# Patient Record
Sex: MALE | Race: WHITE
[De-identification: names, ages, dates, MRNs, and addresses within clinical notes are randomized per-mention and may not be internally consistent; named-entity substitution may affect disease eponyms.]

---

## 2021-06-19 ENCOUNTER — HOSPITAL ENCOUNTER (INPATIENT)
Dept: HOSPITAL 75 - ER | Age: 41
LOS: 14 days | DRG: 207 | End: 2021-07-03
Attending: INTERNAL MEDICINE | Admitting: INTERNAL MEDICINE
Payer: COMMERCIAL

## 2021-06-19 VITALS — DIASTOLIC BLOOD PRESSURE: 62 MMHG | SYSTOLIC BLOOD PRESSURE: 104 MMHG

## 2021-06-19 VITALS — DIASTOLIC BLOOD PRESSURE: 66 MMHG | SYSTOLIC BLOOD PRESSURE: 127 MMHG

## 2021-06-19 VITALS — DIASTOLIC BLOOD PRESSURE: 71 MMHG | SYSTOLIC BLOOD PRESSURE: 124 MMHG

## 2021-06-19 VITALS — WEIGHT: 315 LBS | HEIGHT: 70.98 IN | BODY MASS INDEX: 44.1 KG/M2

## 2021-06-19 DIAGNOSIS — E66.01: ICD-10-CM

## 2021-06-19 DIAGNOSIS — E87.0: ICD-10-CM

## 2021-06-19 DIAGNOSIS — E78.00: ICD-10-CM

## 2021-06-19 DIAGNOSIS — U07.1: Primary | ICD-10-CM

## 2021-06-19 DIAGNOSIS — M19.91: ICD-10-CM

## 2021-06-19 DIAGNOSIS — F17.290: ICD-10-CM

## 2021-06-19 DIAGNOSIS — K21.9: ICD-10-CM

## 2021-06-19 DIAGNOSIS — M19.90: ICD-10-CM

## 2021-06-19 DIAGNOSIS — J96.01: ICD-10-CM

## 2021-06-19 DIAGNOSIS — F41.9: ICD-10-CM

## 2021-06-19 DIAGNOSIS — Z91.14: ICD-10-CM

## 2021-06-19 DIAGNOSIS — J96.02: ICD-10-CM

## 2021-06-19 DIAGNOSIS — G47.33: ICD-10-CM

## 2021-06-19 DIAGNOSIS — F32.9: ICD-10-CM

## 2021-06-19 DIAGNOSIS — J12.82: ICD-10-CM

## 2021-06-19 DIAGNOSIS — J45.909: ICD-10-CM

## 2021-06-19 DIAGNOSIS — I10: ICD-10-CM

## 2021-06-19 DIAGNOSIS — Z66: ICD-10-CM

## 2021-06-19 DIAGNOSIS — Z79.899: ICD-10-CM

## 2021-06-19 DIAGNOSIS — N28.9: ICD-10-CM

## 2021-06-19 DIAGNOSIS — E11.40: ICD-10-CM

## 2021-06-19 DIAGNOSIS — R00.1: ICD-10-CM

## 2021-06-19 DIAGNOSIS — R41.0: ICD-10-CM

## 2021-06-19 DIAGNOSIS — E11.65: ICD-10-CM

## 2021-06-19 DIAGNOSIS — E78.5: ICD-10-CM

## 2021-06-19 DIAGNOSIS — I95.9: ICD-10-CM

## 2021-06-19 LAB
ALBUMIN SERPL-MCNC: 3.6 GM/DL (ref 3.2–4.5)
ALP SERPL-CCNC: 36 U/L (ref 40–136)
ALT SERPL-CCNC: 29 U/L (ref 0–55)
BASE EXCESS STD BLDA CALC-SCNC: 1.5 MMOL/L (ref -2.5–2.5)
BASOPHILS # BLD AUTO: 0 10^3/UL (ref 0–0.1)
BASOPHILS NFR BLD AUTO: 0 % (ref 0–10)
BILIRUB SERPL-MCNC: 1.3 MG/DL (ref 0.1–1)
BODY TEMPERATURE: 101.3
BUN/CREAT SERPL: 9
CALCIUM SERPL-MCNC: 8 MG/DL (ref 8.5–10.1)
CHLORIDE SERPL-SCNC: 92 MMOL/L (ref 98–107)
CO2 BLDA CALC-SCNC: 25.1 MMOL/L (ref 21–31)
CO2 SERPL-SCNC: 25 MMOL/L (ref 21–32)
CREAT SERPL-MCNC: 1.22 MG/DL (ref 0.6–1.3)
EOSINOPHIL # BLD AUTO: 0 10^3/UL (ref 0–0.3)
EOSINOPHIL NFR BLD AUTO: 0 % (ref 0–10)
GFR SERPLBLD BASED ON 1.73 SQ M-ARVRAT: > 60 ML/MIN
GLUCOSE SERPL-MCNC: 381 MG/DL (ref 70–105)
HCT VFR BLD CALC: 40 % (ref 40–54)
HGB BLD-MCNC: 14.1 G/DL (ref 13.3–17.7)
INHALED O2 FLOW RATE: (no result) L/MIN
LYMPHOCYTES # BLD AUTO: 0.8 10^3/UL (ref 1–4)
LYMPHOCYTES NFR BLD AUTO: 10 % (ref 12–44)
MANUAL DIFFERENTIAL PERFORMED BLD QL: NO
MCH RBC QN AUTO: 29 PG (ref 25–34)
MCHC RBC AUTO-ENTMCNC: 36 G/DL (ref 32–36)
MCV RBC AUTO: 80 FL (ref 80–99)
MONOCYTES # BLD AUTO: 0.3 10^3/UL (ref 0–1)
MONOCYTES NFR BLD AUTO: 4 % (ref 0–12)
NEUTROPHILS # BLD AUTO: 7.2 10^3/UL (ref 1.8–7.8)
NEUTROPHILS NFR BLD AUTO: 86 % (ref 42–75)
PCO2 BLDA: 31 MMHG (ref 35–45)
PH BLDA: 7.5 [PH] (ref 7.37–7.43)
PLATELET # BLD: 158 10^3/UL (ref 130–400)
PMV BLD AUTO: 11 FL (ref 9–12.2)
PO2 BLDA: 74 MMHG (ref 79–93)
POTASSIUM SERPL-SCNC: 3.2 MMOL/L (ref 3.6–5)
PROT SERPL-MCNC: 6.8 GM/DL (ref 6.4–8.2)
SAO2 % BLDA FROM PO2: 95 % (ref 94–100)
SODIUM SERPL-SCNC: 131 MMOL/L (ref 135–145)
VENTILATION MODE VENT: NO
WBC # BLD AUTO: 8.4 10^3/UL (ref 4.3–11)

## 2021-06-19 PROCEDURE — 94760 N-INVAS EAR/PLS OXIMETRY 1: CPT

## 2021-06-19 PROCEDURE — 82805 BLOOD GASES W/O2 SATURATION: CPT

## 2021-06-19 PROCEDURE — 86901 BLOOD TYPING SEROLOGIC RH(D): CPT

## 2021-06-19 PROCEDURE — 82150 ASSAY OF AMYLASE: CPT

## 2021-06-19 PROCEDURE — 83036 HEMOGLOBIN GLYCOSYLATED A1C: CPT

## 2021-06-19 PROCEDURE — 87081 CULTURE SCREEN ONLY: CPT

## 2021-06-19 PROCEDURE — 80202 ASSAY OF VANCOMYCIN: CPT

## 2021-06-19 PROCEDURE — 80048 BASIC METABOLIC PNL TOTAL CA: CPT

## 2021-06-19 PROCEDURE — 87186 SC STD MICRODIL/AGAR DIL: CPT

## 2021-06-19 PROCEDURE — 87636 SARSCOV2 & INF A&B AMP PRB: CPT

## 2021-06-19 PROCEDURE — 87040 BLOOD CULTURE FOR BACTERIA: CPT

## 2021-06-19 PROCEDURE — 84132 ASSAY OF SERUM POTASSIUM: CPT

## 2021-06-19 PROCEDURE — 83735 ASSAY OF MAGNESIUM: CPT

## 2021-06-19 PROCEDURE — 87205 SMEAR GRAM STAIN: CPT

## 2021-06-19 PROCEDURE — 76937 US GUIDE VASCULAR ACCESS: CPT

## 2021-06-19 PROCEDURE — 87070 CULTURE OTHR SPECIMN AEROBIC: CPT

## 2021-06-19 PROCEDURE — 96360 HYDRATION IV INFUSION INIT: CPT

## 2021-06-19 PROCEDURE — 85025 COMPLETE CBC W/AUTO DIFF WBC: CPT

## 2021-06-19 PROCEDURE — 94660 CPAP INITIATION&MGMT: CPT

## 2021-06-19 PROCEDURE — 86141 C-REACTIVE PROTEIN HS: CPT

## 2021-06-19 PROCEDURE — 87088 URINE BACTERIA CULTURE: CPT

## 2021-06-19 PROCEDURE — 94799 UNLISTED PULMONARY SVC/PX: CPT

## 2021-06-19 PROCEDURE — 81000 URINALYSIS NONAUTO W/SCOPE: CPT

## 2021-06-19 PROCEDURE — 84145 PROCALCITONIN (PCT): CPT

## 2021-06-19 PROCEDURE — 94640 AIRWAY INHALATION TREATMENT: CPT

## 2021-06-19 PROCEDURE — 36584 COMPL RPLCMT PICC RS&I: CPT

## 2021-06-19 PROCEDURE — 82947 ASSAY GLUCOSE BLOOD QUANT: CPT

## 2021-06-19 PROCEDURE — 85379 FIBRIN DEGRADATION QUANT: CPT

## 2021-06-19 PROCEDURE — 86900 BLOOD TYPING SEROLOGIC ABO: CPT

## 2021-06-19 PROCEDURE — 84100 ASSAY OF PHOSPHORUS: CPT

## 2021-06-19 PROCEDURE — 80053 COMPREHEN METABOLIC PANEL: CPT

## 2021-06-19 PROCEDURE — 85027 COMPLETE CBC AUTOMATED: CPT

## 2021-06-19 PROCEDURE — 94664 DEMO&/EVAL PT USE INHALER: CPT

## 2021-06-19 PROCEDURE — 85007 BL SMEAR W/DIFF WBC COUNT: CPT

## 2021-06-19 PROCEDURE — 94002 VENT MGMT INPAT INIT DAY: CPT

## 2021-06-19 PROCEDURE — 96372 THER/PROPH/DIAG INJ SC/IM: CPT

## 2021-06-19 PROCEDURE — 83880 ASSAY OF NATRIURETIC PEPTIDE: CPT

## 2021-06-19 PROCEDURE — 71045 X-RAY EXAM CHEST 1 VIEW: CPT

## 2021-06-19 PROCEDURE — 84478 ASSAY OF TRIGLYCERIDES: CPT

## 2021-06-19 PROCEDURE — 94003 VENT MGMT INPAT SUBQ DAY: CPT

## 2021-06-19 PROCEDURE — 87077 CULTURE AEROBIC IDENTIFY: CPT

## 2021-06-19 PROCEDURE — 36569 INSJ PICC 5 YR+ W/O IMAGING: CPT

## 2021-06-19 PROCEDURE — 36415 COLL VENOUS BLD VENIPUNCTURE: CPT

## 2021-06-19 PROCEDURE — 83605 ASSAY OF LACTIC ACID: CPT

## 2021-06-19 RX ADMIN — ALBUTEROL SULFATE SCH GM: 90 AEROSOL, METERED RESPIRATORY (INHALATION) at 21:13

## 2021-06-19 RX ADMIN — ALBUTEROL SULFATE SCH GM: 90 AEROSOL, METERED RESPIRATORY (INHALATION) at 18:44

## 2021-06-19 RX ADMIN — DOCUSATE SODIUM AND SENNOSIDES SCH EA: 8.6; 5 TABLET, FILM COATED ORAL at 21:33

## 2021-06-19 RX ADMIN — INSULIN ASPART SCH UNIT: 100 INJECTION, SOLUTION INTRAVENOUS; SUBCUTANEOUS at 19:17

## 2021-06-19 RX ADMIN — INSULIN ASPART SCH UNIT: 100 INJECTION, SOLUTION INTRAVENOUS; SUBCUTANEOUS at 21:04

## 2021-06-19 NOTE — DIAGNOSTIC IMAGING REPORT
Indication: Cough.



Time of exam: 1:35 PM



Correlation is made prior chest 12/15/2015.



The heart size is normal. There is patchy airspace infiltrate in

the right base suggestive of pneumonia. Remaining lung fields are

clear. There is no effusion or pneumothorax.



Impression: Patchy right basilar pneumonia.



Dictated by: 



  Dictated on workstation # [id number]

## 2021-06-19 NOTE — ED RESPIRATORY
General


Chief Complaint:  Respiratory Problems


Stated Complaint:  SOA/COUGH/CHILLS/HEADACHE


Source:  patient


Exam Limitations:  no limitations





History of Present Illness


Date Seen by Provider:  2021


Time Seen by Provider:  13:00


Initial Comments


Diabetic overweight male presents with 48 hours of cough shortness of breath 

malaise fatigue chills nausea.  Has not been vaccinated against Covid.


Timing/Duration:  constant, getting worse


Severity:  moderate


Modifying Factors:  Worse With Activity, Worse With Coughing; Improves With 

Oxygen, Improves With Rest


Associated Symptoms:  cough, fever/chills, headache, muscle aches, nasal co

ngestion, shortness of breath





Allergies and Home Medications


Allergies


Coded Allergies:  


     adhesive (Unverified  Allergy, Unknown, RASH, 14)





Home Medications


Albuterol Sulfate 2.5 Mg/0.5 Ml Vial.neb, 2.5 MG IH Q4H


   Prescribed by: DAWSON HUBBARD on 12/15/15 2246


Benzonatate 100 Mg Capsule, 1-2 TAB PO TID


   Prescribed by: DAWSON HUBBARD on 12/15/15 2246


Budesonide 1 Mg/2 Ml Ampul.neb, 1 MG IH BID


   Prescribed by: DAWSON HUBBARD on 12/15/15 2246


Budesonide/Formoterol Fumarate 10.2 Gm Hfa.aer.ad, 2 PUFF IH BID, (Reported)


Cefdinir 300 Mg Capsule, 300 MG PO BID


   Prescribed by: DAWSON HUBBARD on 12/15/15 2246


Duloxetine Hcl 30 Mg Capsule.dr, 30 MG PO HS, (Reported)


Fenofibric Acid (Choline) 135 Mg Capsule.dr, 135 MG PO HS, (Reported)


Fluticasone Propionate 16 Gm Naspr, 1 SPRAY NS BID PRN, (Reported)


   AS NEEDED FOR ALLERGY SYMPTOMS 


Hctz/Lisinopril 1 Tab Tablet, 1 TAB PO HS, (Reported)


   20-25MG TABLET 


Liraglutide 0.6 Mg/0.1 Ml Pen.injctr, 1.8 MG SQ HS, (Reported)


Meloxicam 7.5 Mg Tablet, 7.5 MG PO DAILY, (Reported)


Mometasone Furoate 17 Gm Naspr, 2 SPRAYS NSEACH BID


   Prescribed by: DAWSON HUBBARD on 12/15/15 2246


Montelukast Sodium 10 Mg Tablet, 10 MG PO HS, (Reported)


Oxycodone Hcl/Acetaminophen 1 Each Tablet, 1-2 EACH PO Q4-6H PRN


   Prescribed by: ARVIN SCHULER on 14 1040


Pregabalin 100 Mg Capsule, 100 MG PO TID, (Reported)


Rabeprazole Sodium 20 Mg Tablet.dr, 20 MG PO HS, (Reported)


[Proventil Inhaler]  , 2 PUFF IH Q4H PRN for SHORTNESS OF BREATH, (Reported)


   PRN SOB 





Patient Home Medication List


Home Medication List Reviewed:  Yes





Review of Systems


Review of Systems


Constitutional:  see HPI, chills, malaise, weakness


EENTM:  see HPI


Respiratory:  see HPI, cough, short of breath


Cardiovascular:  no symptoms reported


Genitourinary:  no symptoms reported


Musculoskeletal:  no symptoms reported


Skin:  no symptoms reported


Psychiatric/Neurological:  No Symptoms Reported





Past Medical-Social-Family Hx


Immunizations Up To Date


Date of Influenza Vaccine:  2012





Seasonal Allergies


Seasonal Allergies:  Yes





Past Medical History


Eye Surgery, Vasectomy


Asthma


High Cholesterol, Hypertension


Neuropathy


Reproductive Disorders:  No


Kidney Stones


Gastroesophageal Reflux, Hemorrhoids


Arthritis


Diabetes, Non-Insulin dep


Anxiety, Depression





Physical Exam





Vital Signs - First Documented








 21





 12:45


 


Temp 38.0


 


Pulse 83


 


Resp 22


 


B/P (MAP) 116/77 (90)


 


Pulse Ox 94


 


O2 Delivery Nasal Cannula


 


O2 Flow Rate 2.00





Capillary Refill :


Height: 5'11"


Weight: 305lbs. oz. 138.087171kn;  BMI


Method:Stated


General Appearance:  WD/WN, no apparent distress, obese, other (No distress.  

88% on room air.  Up to 94% with 2 L of supplemental oxygen.)


Eyes:  Bilateral Eye Normal Inspection, Bilateral Eye PERRL, Bilateral Eye EOMI


Neck:  non-tender, full range of motion


Respiratory:  chest non-tender, no respiratory distress, no accessory muscle use


Cardiovascular:  regular rate, rhythm, no murmur


Gastrointestinal:  normal bowel sounds, non tender, soft


Neurologic/Psychiatric:  alert, normal mood/affect, oriented x 3


Skin:  normal color, warm/dry





Focused Exam


Lactate Level


21 12:50: Lactic Acid Level 1.97





Lactic Acid Level





Laboratory Tests








Test


 21


12:50


 


Lactic Acid Level


 1.97 MMOL/L


(0.50-2.00)











Progress/Results/Core Measures


Suspected Sepsis


SIRS


Temperature: 


Pulse:  


Respiratory Rate: 


 


Laboratory Tests


21 12:50: White Blood Count 8.4


Blood Pressure  / 


Mean: 


 


21 12:50: Lactic Acid Level 1.97


Laboratory Tests


21 12:50: 


Creatinine 1.22, Platelet Count 158, Total Bilirubin 1.3H








Results/Orders


Lab Results





Laboratory Tests








Test


 21


12:50 Range/Units


 


 


White Blood Count


 8.4 


 4.3-11.0


10^3/uL


 


Red Blood Count


 4.94 


 4.30-5.52


10^6/uL


 


Hemoglobin 14.1  13.3-17.7  g/dL


 


Hematocrit 40  40-54  %


 


Mean Corpuscular Volume 80  80-99  fL


 


Mean Corpuscular Hemoglobin 29  25-34  pg


 


Mean Corpuscular Hemoglobin


Concent 36 


 32-36  g/dL





 


Red Cell Distribution Width 12.5  10.0-14.5  %


 


Platelet Count


 158 


 130-400


10^3/uL


 


Mean Platelet Volume 11.0  9.0-12.2  fL


 


Immature Granulocyte % (Auto) 1   %


 


Neutrophils (%) (Auto) 86 H 42-75  %


 


Lymphocytes (%) (Auto) 10 L 12-44  %


 


Monocytes (%) (Auto) 4  0-12  %


 


Eosinophils (%) (Auto) 0  0-10  %


 


Basophils (%) (Auto) 0  0-10  %


 


Neutrophils # (Auto)


 7.2 


 1.8-7.8


10^3/uL


 


Lymphocytes # (Auto)


 0.8 L


 1.0-4.0


10^3/uL


 


Monocytes # (Auto)


 0.3 


 0.0-1.0


10^3/uL


 


Eosinophils # (Auto)


 0.0 


 0.0-0.3


10^3/uL


 


Basophils # (Auto)


 0.0 


 0.0-0.1


10^3/uL


 


Immature Granulocyte # (Auto)


 0.0 


 0.0-0.1


10^3/uL


 


D-Dimer


 0.50 H


 0.00-0.49


UG/ML


 


Sodium Level 131 L 135-145  MMOL/L


 


Potassium Level 3.2 L 3.6-5.0  MMOL/L


 


Chloride Level 92 L   MMOL/L


 


Carbon Dioxide Level 25  21-32  MMOL/L


 


Anion Gap 14  5-14  MMOL/L


 


Blood Urea Nitrogen 11  7-18  MG/DL


 


Creatinine


 1.22 


 0.60-1.30


MG/DL


 


Estimat Glomerular Filtration


Rate > 60 


  





 


BUN/Creatinine Ratio 9   


 


Glucose Level 381 H   MG/DL


 


Lactic Acid Level


 1.97 


 0.50-2.00


MMOL/L


 


Calcium Level 8.0 L 8.5-10.1  MG/DL


 


Corrected Calcium 8.3 L 8.5-10.1  MG/DL


 


Total Bilirubin 1.3 H 0.1-1.0  MG/DL


 


Aspartate Amino Transf


(AST/SGOT) 27 


 5-34  U/L





 


Alanine Aminotransferase


(ALT/SGPT) 29 


 0-55  U/L





 


Alkaline Phosphatase 36 L   U/L


 


C-Reactive Protein High


Sensitivity 16.44 H


 0.00-0.50


MG/DL


 


B-Type Natriuretic Peptide 48.2  <100.0  PG/ML


 


Total Protein 6.8  6.4-8.2  GM/DL


 


Albumin 3.6  3.2-4.5  GM/DL


 


Procalcitonin 0.28 H <0.10  NG/ML


 


Influenza Type A (RT-PCR) Not Detected  Not Detecte  


 


Influenza Type B (RT-PCR) Not Detected  Not Detecte  


 


SARS-CoV-2 RNA (RT-PCR) Detected H Not Detecte  








My Orders





Orders - ALEXA RIBEIRO APRN


Cbc With Automated Diff (21 12:52)


Covid 19 Inhouse Test (21 12:52)


Hs C Reactive Protein (21 12:52)


Comprehensive Metabolic Panel (21 12:52)


Fibrin Degradation Products (21 12:52)


Chest 1 View, Ap/Pa Only (21 12:52)


Procalcitonin (Pct) (21 12:52)


Ed Iv/Invasive Line Start (21 12:52)


Blood Culture (21 12:52)


Influenza A And B By Pcr (21 12:52)


Lactic Acid Analyzer (21 12:52)


BNP (21 13:43)





Medications Given in ED





Vital Signs/I&O











 21





 12:45


 


Temp 38.0


 


Pulse 83


 


Resp 22


 


B/P (MAP) 116/77 (90)


 


Pulse Ox 94


 


O2 Delivery Nasal Cannula


 


O2 Flow Rate 2.00





Capillary Refill :





Diagnostic Imaging





   Diagonstic Imaging:  Xray


Comments


NAME:   TINO BOWERS


MED REC#:   [id number]


ACCOUNT#:   [id number]


PT STATUS:   REG ER


:   1980


PHYSICIAN:   ALEXA RIBEIRO


ADMIT DATE:   21/ER


                                   ***Draft***


Date of Exam:21





CHEST 1 VIEW, AP/PA ONLY








Indication: Cough.





Time of exam: 1:35 PM





Correlation is made prior chest 12/15/2015.





The heart size is normal. There is patchy airspace infiltrate in


the right base suggestive of pneumonia. Remaining lung fields are


clear. There is no effusion or pneumothorax.





Impression: Patchy right basilar pneumonia.





  Dictated on workstation # [id number]








Dict:   21 1346


Trans:   21 1359


CVB [id number]





Interpreted by:     ANN DAWKINS MD


Electronically signed by:





Departure


Communication (Admissions)


[id number] spoke with Dr. Garcia will admit on remdesivir, Decadron, 20 units of 

regular insulin





Impression





   Primary Impression:  


   COVID-19


   Additional Impression:  


   Hypoxia


Disposition:   ADMITTED AS INPATIENT


Condition:  Stable





Admissions


Decision to Admit Reason:  Admit from ER (General)


Decision to Admit/Date:  2021


Time/Decision to Admit Time:  14:03





Departure-Patient Inst.


Referrals:  


[facility] [place]


Primary Care Physician











ALEXA RIBEIRO             2021 13:26

## 2021-06-20 VITALS — SYSTOLIC BLOOD PRESSURE: 116 MMHG | DIASTOLIC BLOOD PRESSURE: 74 MMHG

## 2021-06-20 VITALS — SYSTOLIC BLOOD PRESSURE: 120 MMHG | DIASTOLIC BLOOD PRESSURE: 65 MMHG

## 2021-06-20 VITALS — SYSTOLIC BLOOD PRESSURE: 107 MMHG | DIASTOLIC BLOOD PRESSURE: 64 MMHG

## 2021-06-20 VITALS — SYSTOLIC BLOOD PRESSURE: 94 MMHG | DIASTOLIC BLOOD PRESSURE: 53 MMHG

## 2021-06-20 VITALS — DIASTOLIC BLOOD PRESSURE: 64 MMHG | SYSTOLIC BLOOD PRESSURE: 117 MMHG

## 2021-06-20 VITALS — DIASTOLIC BLOOD PRESSURE: 55 MMHG | SYSTOLIC BLOOD PRESSURE: 117 MMHG

## 2021-06-20 VITALS — DIASTOLIC BLOOD PRESSURE: 61 MMHG | SYSTOLIC BLOOD PRESSURE: 110 MMHG

## 2021-06-20 VITALS — SYSTOLIC BLOOD PRESSURE: 115 MMHG | DIASTOLIC BLOOD PRESSURE: 57 MMHG

## 2021-06-20 VITALS — SYSTOLIC BLOOD PRESSURE: 118 MMHG | DIASTOLIC BLOOD PRESSURE: 62 MMHG

## 2021-06-20 VITALS — SYSTOLIC BLOOD PRESSURE: 118 MMHG | DIASTOLIC BLOOD PRESSURE: 66 MMHG

## 2021-06-20 VITALS — DIASTOLIC BLOOD PRESSURE: 59 MMHG | SYSTOLIC BLOOD PRESSURE: 107 MMHG

## 2021-06-20 VITALS — DIASTOLIC BLOOD PRESSURE: 65 MMHG | SYSTOLIC BLOOD PRESSURE: 153 MMHG

## 2021-06-20 VITALS — SYSTOLIC BLOOD PRESSURE: 105 MMHG | DIASTOLIC BLOOD PRESSURE: 60 MMHG

## 2021-06-20 VITALS — DIASTOLIC BLOOD PRESSURE: 70 MMHG | SYSTOLIC BLOOD PRESSURE: 115 MMHG

## 2021-06-20 VITALS — SYSTOLIC BLOOD PRESSURE: 114 MMHG | DIASTOLIC BLOOD PRESSURE: 58 MMHG

## 2021-06-20 VITALS — SYSTOLIC BLOOD PRESSURE: 128 MMHG | DIASTOLIC BLOOD PRESSURE: 71 MMHG

## 2021-06-20 VITALS — DIASTOLIC BLOOD PRESSURE: 72 MMHG | SYSTOLIC BLOOD PRESSURE: 108 MMHG

## 2021-06-20 VITALS — DIASTOLIC BLOOD PRESSURE: 71 MMHG | SYSTOLIC BLOOD PRESSURE: 128 MMHG

## 2021-06-20 VITALS — SYSTOLIC BLOOD PRESSURE: 116 MMHG | DIASTOLIC BLOOD PRESSURE: 60 MMHG

## 2021-06-20 VITALS — SYSTOLIC BLOOD PRESSURE: 116 MMHG | DIASTOLIC BLOOD PRESSURE: 55 MMHG

## 2021-06-20 LAB
ALBUMIN SERPL-MCNC: 3.2 GM/DL (ref 3.2–4.5)
ALP SERPL-CCNC: 39 U/L (ref 40–136)
ALT SERPL-CCNC: 50 U/L (ref 0–55)
ARTERIAL PATENCY WRIST A: POSITIVE
BASE EXCESS STD BLDA CALC-SCNC: 2.5 MMOL/L (ref -2.5–2.5)
BASOPHILS # BLD AUTO: 0 10^3/UL (ref 0–0.1)
BASOPHILS # BLD AUTO: 0 10^3/UL (ref 0–0.1)
BASOPHILS NFR BLD AUTO: 0 % (ref 0–10)
BASOPHILS NFR BLD AUTO: 0 % (ref 0–10)
BDY SITE: (no result)
BILIRUB SERPL-MCNC: 1 MG/DL (ref 0.1–1)
BODY TEMPERATURE: 38.6
BUN/CREAT SERPL: 14
BUN/CREAT SERPL: 15
CALCIUM SERPL-MCNC: 7.6 MG/DL (ref 8.5–10.1)
CALCIUM SERPL-MCNC: 8.1 MG/DL (ref 8.5–10.1)
CHLORIDE SERPL-SCNC: 96 MMOL/L (ref 98–107)
CHLORIDE SERPL-SCNC: 97 MMOL/L (ref 98–107)
CO2 BLDA CALC-SCNC: 26.2 MMOL/L (ref 21–31)
CO2 SERPL-SCNC: 23 MMOL/L (ref 21–32)
CO2 SERPL-SCNC: 23 MMOL/L (ref 21–32)
CREAT SERPL-MCNC: 1.13 MG/DL (ref 0.6–1.3)
CREAT SERPL-MCNC: 1.23 MG/DL (ref 0.6–1.3)
EOSINOPHIL # BLD AUTO: 0 10^3/UL (ref 0–0.3)
EOSINOPHIL # BLD AUTO: 0 10^3/UL (ref 0–0.3)
EOSINOPHIL NFR BLD AUTO: 0 % (ref 0–10)
EOSINOPHIL NFR BLD AUTO: 0 % (ref 0–10)
GFR SERPLBLD BASED ON 1.73 SQ M-ARVRAT: > 60 ML/MIN
GFR SERPLBLD BASED ON 1.73 SQ M-ARVRAT: > 60 ML/MIN
GLUCOSE SERPL-MCNC: 276 MG/DL (ref 70–105)
GLUCOSE SERPL-MCNC: 417 MG/DL (ref 70–105)
HCT VFR BLD CALC: 35 % (ref 40–54)
HCT VFR BLD CALC: 36 % (ref 40–54)
HGB BLD-MCNC: 12.3 G/DL (ref 13.3–17.7)
HGB BLD-MCNC: 12.7 G/DL (ref 13.3–17.7)
INHALED O2 FLOW RATE: (no result) L/MIN
LYMPHOCYTES # BLD AUTO: 0.9 10^3/UL (ref 1–4)
LYMPHOCYTES # BLD AUTO: 1 10^3/UL (ref 1–4)
LYMPHOCYTES NFR BLD AUTO: 13 % (ref 12–44)
LYMPHOCYTES NFR BLD AUTO: 7 % (ref 12–44)
MANUAL DIFFERENTIAL PERFORMED BLD QL: NO
MANUAL DIFFERENTIAL PERFORMED BLD QL: YES
MCH RBC QN AUTO: 29 PG (ref 25–34)
MCH RBC QN AUTO: 29 PG (ref 25–34)
MCHC RBC AUTO-ENTMCNC: 35 G/DL (ref 32–36)
MCHC RBC AUTO-ENTMCNC: 36 G/DL (ref 32–36)
MCV RBC AUTO: 80 FL (ref 80–99)
MCV RBC AUTO: 81 FL (ref 80–99)
MONOCYTES # BLD AUTO: 0.3 10^3/UL (ref 0–1)
MONOCYTES # BLD AUTO: 0.3 10^3/UL (ref 0–1)
MONOCYTES NFR BLD AUTO: 3 % (ref 0–12)
MONOCYTES NFR BLD AUTO: 4 % (ref 0–12)
MONOCYTES NFR BLD: 2 %
NEUTROPHILS # BLD AUTO: 11.5 10^3/UL (ref 1.8–7.8)
NEUTROPHILS # BLD AUTO: 6.3 10^3/UL (ref 1.8–7.8)
NEUTROPHILS NFR BLD AUTO: 82 % (ref 42–75)
NEUTROPHILS NFR BLD AUTO: 90 % (ref 42–75)
NEUTS BAND NFR BLD MANUAL: 90 %
PCO2 BLDA: 33 MMHG (ref 35–45)
PH BLDA: 7.51 [PH] (ref 7.37–7.43)
PLATELET # BLD: 144 10^3/UL (ref 130–400)
PLATELET # BLD: 215 10^3/UL (ref 130–400)
PMV BLD AUTO: 11.3 FL (ref 9–12.2)
PMV BLD AUTO: 11.5 FL (ref 9–12.2)
PO2 BLDA: 85 MMHG (ref 79–93)
POTASSIUM SERPL-SCNC: 3 MMOL/L (ref 3.6–5)
POTASSIUM SERPL-SCNC: 3.3 MMOL/L (ref 3.6–5)
PROT SERPL-MCNC: 6.3 GM/DL (ref 6.4–8.2)
RBC MORPH BLD: NORMAL
SAO2 % BLDA FROM PO2: 96 % (ref 94–100)
SODIUM SERPL-SCNC: 133 MMOL/L (ref 135–145)
SODIUM SERPL-SCNC: 135 MMOL/L (ref 135–145)
VARIANT LYMPHS NFR BLD MANUAL: 8 %
VENTILATION MODE VENT: NO
WBC # BLD AUTO: 12.8 10^3/UL (ref 4.3–11)
WBC # BLD AUTO: 7.7 10^3/UL (ref 4.3–11)

## 2021-06-20 PROCEDURE — XW13325 TRANSFUSION OF CONVALESCENT PLASMA (NONAUTOLOGOUS) INTO PERIPHERAL VEIN, PERCUTANEOUS APPROACH, NEW TECHNOLOGY GROUP 5: ICD-10-PCS | Performed by: INTERNAL MEDICINE

## 2021-06-20 PROCEDURE — 02HV33Z INSERTION OF INFUSION DEVICE INTO SUPERIOR VENA CAVA, PERCUTANEOUS APPROACH: ICD-10-PCS | Performed by: INTERNAL MEDICINE

## 2021-06-20 PROCEDURE — 5A09457 ASSISTANCE WITH RESPIRATORY VENTILATION, 24-96 CONSECUTIVE HOURS, CONTINUOUS POSITIVE AIRWAY PRESSURE: ICD-10-PCS | Performed by: INTERNAL MEDICINE

## 2021-06-20 PROCEDURE — XW033E5 INTRODUCTION OF REMDESIVIR ANTI-INFECTIVE INTO PERIPHERAL VEIN, PERCUTANEOUS APPROACH, NEW TECHNOLOGY GROUP 5: ICD-10-PCS | Performed by: INTERNAL MEDICINE

## 2021-06-20 RX ADMIN — POTASSIUM CHLORIDE SCH MLS/HR: 200 INJECTION, SOLUTION INTRAVENOUS at 23:24

## 2021-06-20 RX ADMIN — INSULIN ASPART SCH UNIT: 100 INJECTION, SOLUTION INTRAVENOUS; SUBCUTANEOUS at 06:14

## 2021-06-20 RX ADMIN — DOCUSATE SODIUM AND SENNOSIDES SCH EA: 8.6; 5 TABLET, FILM COATED ORAL at 19:18

## 2021-06-20 RX ADMIN — INSULIN ASPART SCH UNIT: 100 INJECTION, SOLUTION INTRAVENOUS; SUBCUTANEOUS at 17:10

## 2021-06-20 RX ADMIN — INSULIN HUMAN PRN UNIT: 100 INJECTION, SOLUTION PARENTERAL at 17:10

## 2021-06-20 RX ADMIN — INSULIN HUMAN PRN UNIT: 100 INJECTION, SOLUTION PARENTERAL at 20:34

## 2021-06-20 RX ADMIN — INSULIN ASPART SCH UNIT: 100 INJECTION, SOLUTION INTRAVENOUS; SUBCUTANEOUS at 11:33

## 2021-06-20 RX ADMIN — POTASSIUM CHLORIDE SCH MLS/HR: 200 INJECTION, SOLUTION INTRAVENOUS at 22:53

## 2021-06-20 RX ADMIN — FLUTICASONE PROPIONATE AND SALMETEROL SCH EACH: 232; 14 POWDER, METERED RESPIRATORY (INHALATION) at 03:38

## 2021-06-20 RX ADMIN — DEXAMETHASONE SCH MG: 6 TABLET ORAL at 06:14

## 2021-06-20 RX ADMIN — ALBUTEROL SULFATE SCH GM: 90 AEROSOL, METERED RESPIRATORY (INHALATION) at 03:38

## 2021-06-20 RX ADMIN — ALBUTEROL SULFATE SCH GM: 90 AEROSOL, METERED RESPIRATORY (INHALATION) at 10:16

## 2021-06-20 RX ADMIN — FLUTICASONE PROPIONATE AND SALMETEROL SCH EACH: 232; 14 POWDER, METERED RESPIRATORY (INHALATION) at 06:56

## 2021-06-20 RX ADMIN — ALBUTEROL SULFATE SCH GM: 90 AEROSOL, METERED RESPIRATORY (INHALATION) at 06:52

## 2021-06-20 RX ADMIN — ALBUTEROL SULFATE SCH GM: 90 AEROSOL, METERED RESPIRATORY (INHALATION) at 19:41

## 2021-06-20 RX ADMIN — INSULIN ASPART SCH UNIT: 100 INJECTION, SOLUTION INTRAVENOUS; SUBCUTANEOUS at 20:35

## 2021-06-20 RX ADMIN — SODIUM CHLORIDE SCH MLS/HR: 900 INJECTION, SOLUTION INTRAVENOUS at 09:05

## 2021-06-20 RX ADMIN — ENOXAPARIN SODIUM SCH MG: 100 INJECTION SUBCUTANEOUS at 12:24

## 2021-06-20 RX ADMIN — DOCUSATE SODIUM AND SENNOSIDES SCH EA: 8.6; 5 TABLET, FILM COATED ORAL at 07:38

## 2021-06-20 RX ADMIN — ALBUTEROL SULFATE SCH GM: 90 AEROSOL, METERED RESPIRATORY (INHALATION) at 20:21

## 2021-06-20 RX ADMIN — ALBUTEROL SULFATE SCH GM: 90 AEROSOL, METERED RESPIRATORY (INHALATION) at 13:58

## 2021-06-20 RX ADMIN — ALPRAZOLAM PRN MG: 0.25 TABLET ORAL at 08:26

## 2021-06-20 RX ADMIN — ENOXAPARIN SODIUM SCH MG: 100 INJECTION SUBCUTANEOUS at 20:22

## 2021-06-20 RX ADMIN — FLUTICASONE PROPIONATE AND SALMETEROL SCH EACH: 232; 14 POWDER, METERED RESPIRATORY (INHALATION) at 20:29

## 2021-06-20 NOTE — DIAGNOSTIC IMAGING REPORT
EXAM: Chest 1 view, AP/PA only.



INDICATION: Pneumonia. COVID.



COMPARISON: Chest radiograph 06/19/2021.



FINDINGS: Diffuse airspace opacities are similar to yesterday. No

pleural effusion or pneumothorax. Low lung volumes. Normal heart

size and central pulmonary vascularity. No acute osseous

findings.



IMPRESSION: Airspace opacities, bilaterally, are similar to

yesterday. 



Dictated by: 



  Dictated on workstation # [id number]

## 2021-06-20 NOTE — HISTORY & PHYSICAL-HOSPITALIST
History of Present Illness


HPI/Chief Complaint


Chief complaint: Shortness of breath due to Covid





History of present illness: This is a 40-year-old white male diabetic 

noncompliant with diabetic medication who does not use oxygen or CPAP machine 

but appears to be high risk for CPAP due to neck circumference and BMI of 40 who

presented to the ER with shortness of breath was diagnosed with Covid and he is 

hypoxic.  Patient has progressed throughout the day and at 2100 hrs. the 

decision was made to transfer up to the ICU for pulmonary critical care 

management due to maxed on Vapotherm likely will need BiPAP and possible 

intubation.  His blood sugars remain in the 300s with aggressive insulin regimen

trying to decrease the level.  He does not currently smoke or drink alcohol any 

works for [employer].  He has not been vaccinated against COVID-19.


Source:  patient, RN/MD


Exam Limitations:  no limitations


Date Seen


6/20/21


Time Seen by a Provider:  10:30


Attending Physician


Suzanne Garcia DO


[facility]/Critical access hospital


Referring Physician





Date of Admission


Jun 19, 2021 at 14:00





Home Medications & Allergies


Home Medications


Reviewed patient Home Medication Reconciliation performed by pharmacy medication

reconciliations technician and/or nursing.


Patients Allergies have been reviewed.





Allergies





Allergies


Coded Allergies


  adhesive (Unverified Allergy, Unknown, RASH, 11/6/14)








Past Medical-Social-Family Hx


Patient Social History


Marrital Status:  single


Employed/Student:  employed


Tobacco Use?:  No


Smoking Status:  Never a Smoker


Smokeless Tobacco Frequency:  Never a User


Use of E-Cig and/or Vaping dev:  Yes


E-Cig or Vaping type used:  Nicotine


Use of E-Cig and/or Vaping John:  Current Everyday User


Substance use?:  No


Alcohol Use?:  No


Pt feels they are or have been:  No





Immunizations Up To Date


Date of Influenza Vaccine:  Nov 1, 2012


First/Initial COVID19 Vaccinat:  NEVER


Tetanus Booster (TDap):  More Than 5 Years


Hepatitis A:  No


Hepatitis B:  No


PED Vaccines UTD:  Yes





Seasonal Allergies


Seasonal Allergies:  Yes





Current Status


Advance Directives:  No


Communicates:  Verbally


Primary Language:  English


Preferred Spoken Language:  English


Is interpretation needed?:  No


Implanted or Applied Medical D:  None





Past Medical History


Surgeries:  Eye Surgery, Vasectomy


Asthma


High Cholesterol, Hypertension


Neuropathy


Kidney Stones


Gastroesophageal Reflux, Hemorrhoids


Arthritis


Diabetes, Non-Insulin dep


Anxiety, Depression


Blood Disorders:  No





Review of Systems


Constitutional:  see HPI, weakness


Respiratory:  cough





Physical Exam


Physical Exam


Vital Signs





Vital Signs - First Documented








 6/19/21 6/19/21





 12:45 16:43


 


Temp 38.0 


 


Pulse 83 


 


Resp 22 


 


B/P (MAP) 116/77 (90) 


 


Pulse Ox 94 


 


O2 Delivery Nasal Cannula 


 


O2 Flow Rate 2.00 


 


FiO2  28





Capillary Refill : Less Than 3 Seconds


Height, Weight, BMI


Height: 5'11"


Weight: 305lbs. oz. 138.992754ha; 39.92 BMI


Method:Stated


General Appearance:  No Apparent Distress, Anxious, Chronically ill, Obese


Eyes:  Right Eye Normal Inspection, Right Eye PERRL


HEENT:  PERRL/EOMI, Normal ENT Inspection, Pharynx Normal, Moist Mucous 

Membranes


Neck:  Full Range of Motion, Normal Inspection, Non Tender


Respiratory:  Chest Non Tender, Lungs Clear, No Accessory Muscle Use, No 

Respiratory Distress, Decreased Breath Sounds


Cardiovascular:  Regular Rate, Rhythm, No Edema, No Gallop, No JVD, No Murmur, 

Normal Peripheral Pulses


Gastrointestinal:  Normal Bowel Sounds, No Organomegaly, No Pulsatile Mass, Non 

Tender, Soft


Back:  Normal Inspection, No CVA Tenderness, No Vertebral Tenderness


Extremity:  Normal Capillary Refill, Normal Inspection, Normal Range of Motion, 

Non Tender, No Calf Tenderness, No Pedal Edema


Neurologic/Psychiatric:  Alert, Oriented x3, No Motor/Sensory Deficits, Normal 

Mood/Affect


Skin:  Normal Color, Warm/Dry


Lymphatic:  No Adenopathy





Results


Results/Procedures


Labs


Laboratory Tests


6/19/21 12:50








6/20/21 05:20








Patient resulted labs reviewed.





Assessment/Plan


Admission Diagnosis


Assessment:


COVID-19 pneumonia


Hypoxia maxing on Vapotherm transferring to ICU for BiPAP and possible 

intubation


Morbid obesity BMI 40


Diabetes out-of-control in 300 range hemoglobin A1c pending


Neuropathy


Hypertension


Hyperlipidemia





Plan:


Transfer to the ICU


BiPAP


Possible intubation


Lovenox twice daily


Decadron


Convalescent plasma


Remdesivir


Monitor closely


Admission Status:  Inpatient Order (span 2 midnights)


Reason for Inpatient Admission:  


COVID





Diagnosis/Problems


Diagnosis/Problems





(1) COVID-19


Status:  Acute


(2) Hypoxia


Status:  Acute











SUZANNE GARCIA DO                Jun 20, 2021 05:53

## 2021-06-21 VITALS — DIASTOLIC BLOOD PRESSURE: 78 MMHG | SYSTOLIC BLOOD PRESSURE: 120 MMHG

## 2021-06-21 VITALS — SYSTOLIC BLOOD PRESSURE: 126 MMHG | DIASTOLIC BLOOD PRESSURE: 93 MMHG

## 2021-06-21 VITALS — DIASTOLIC BLOOD PRESSURE: 62 MMHG | SYSTOLIC BLOOD PRESSURE: 96 MMHG

## 2021-06-21 VITALS — DIASTOLIC BLOOD PRESSURE: 74 MMHG | SYSTOLIC BLOOD PRESSURE: 116 MMHG

## 2021-06-21 VITALS — SYSTOLIC BLOOD PRESSURE: 129 MMHG | DIASTOLIC BLOOD PRESSURE: 79 MMHG

## 2021-06-21 VITALS — DIASTOLIC BLOOD PRESSURE: 95 MMHG | SYSTOLIC BLOOD PRESSURE: 149 MMHG

## 2021-06-21 VITALS — SYSTOLIC BLOOD PRESSURE: 149 MMHG | DIASTOLIC BLOOD PRESSURE: 88 MMHG

## 2021-06-21 VITALS — SYSTOLIC BLOOD PRESSURE: 141 MMHG | DIASTOLIC BLOOD PRESSURE: 81 MMHG

## 2021-06-21 VITALS — SYSTOLIC BLOOD PRESSURE: 111 MMHG | DIASTOLIC BLOOD PRESSURE: 64 MMHG

## 2021-06-21 VITALS — DIASTOLIC BLOOD PRESSURE: 76 MMHG | SYSTOLIC BLOOD PRESSURE: 125 MMHG

## 2021-06-21 VITALS — DIASTOLIC BLOOD PRESSURE: 72 MMHG | SYSTOLIC BLOOD PRESSURE: 108 MMHG

## 2021-06-21 VITALS — DIASTOLIC BLOOD PRESSURE: 68 MMHG | SYSTOLIC BLOOD PRESSURE: 114 MMHG

## 2021-06-21 VITALS — SYSTOLIC BLOOD PRESSURE: 116 MMHG | DIASTOLIC BLOOD PRESSURE: 74 MMHG

## 2021-06-21 VITALS — SYSTOLIC BLOOD PRESSURE: 96 MMHG | DIASTOLIC BLOOD PRESSURE: 62 MMHG

## 2021-06-21 VITALS — DIASTOLIC BLOOD PRESSURE: 72 MMHG | SYSTOLIC BLOOD PRESSURE: 127 MMHG

## 2021-06-21 VITALS — DIASTOLIC BLOOD PRESSURE: 67 MMHG | SYSTOLIC BLOOD PRESSURE: 103 MMHG

## 2021-06-21 VITALS — SYSTOLIC BLOOD PRESSURE: 154 MMHG | DIASTOLIC BLOOD PRESSURE: 88 MMHG

## 2021-06-21 VITALS — DIASTOLIC BLOOD PRESSURE: 102 MMHG | SYSTOLIC BLOOD PRESSURE: 149 MMHG

## 2021-06-21 VITALS — SYSTOLIC BLOOD PRESSURE: 134 MMHG | DIASTOLIC BLOOD PRESSURE: 77 MMHG

## 2021-06-21 VITALS — DIASTOLIC BLOOD PRESSURE: 93 MMHG | SYSTOLIC BLOOD PRESSURE: 126 MMHG

## 2021-06-21 VITALS — DIASTOLIC BLOOD PRESSURE: 67 MMHG | SYSTOLIC BLOOD PRESSURE: 112 MMHG

## 2021-06-21 VITALS — SYSTOLIC BLOOD PRESSURE: 168 MMHG | DIASTOLIC BLOOD PRESSURE: 126 MMHG

## 2021-06-21 VITALS — DIASTOLIC BLOOD PRESSURE: 62 MMHG | SYSTOLIC BLOOD PRESSURE: 95 MMHG

## 2021-06-21 VITALS — SYSTOLIC BLOOD PRESSURE: 106 MMHG | DIASTOLIC BLOOD PRESSURE: 67 MMHG

## 2021-06-21 VITALS — SYSTOLIC BLOOD PRESSURE: 109 MMHG | DIASTOLIC BLOOD PRESSURE: 74 MMHG

## 2021-06-21 VITALS — SYSTOLIC BLOOD PRESSURE: 111 MMHG | DIASTOLIC BLOOD PRESSURE: 54 MMHG

## 2021-06-21 VITALS — SYSTOLIC BLOOD PRESSURE: 119 MMHG | DIASTOLIC BLOOD PRESSURE: 77 MMHG

## 2021-06-21 VITALS — SYSTOLIC BLOOD PRESSURE: 130 MMHG | DIASTOLIC BLOOD PRESSURE: 86 MMHG

## 2021-06-21 LAB
BASOPHILS # BLD AUTO: 0 10^3/UL (ref 0–0.1)
BASOPHILS NFR BLD AUTO: 0 % (ref 0–10)
BUN/CREAT SERPL: 19
BUN/CREAT SERPL: 22
CALCIUM SERPL-MCNC: 7.9 MG/DL (ref 8.5–10.1)
CALCIUM SERPL-MCNC: 8 MG/DL (ref 8.5–10.1)
CHLORIDE SERPL-SCNC: 100 MMOL/L (ref 98–107)
CHLORIDE SERPL-SCNC: 101 MMOL/L (ref 98–107)
CO2 SERPL-SCNC: 24 MMOL/L (ref 21–32)
CO2 SERPL-SCNC: 25 MMOL/L (ref 21–32)
CREAT SERPL-MCNC: 0.78 MG/DL (ref 0.6–1.3)
CREAT SERPL-MCNC: 0.96 MG/DL (ref 0.6–1.3)
EOSINOPHIL # BLD AUTO: 0 10^3/UL (ref 0–0.3)
EOSINOPHIL NFR BLD AUTO: 0 % (ref 0–10)
GFR SERPLBLD BASED ON 1.73 SQ M-ARVRAT: > 60 ML/MIN
GFR SERPLBLD BASED ON 1.73 SQ M-ARVRAT: > 60 ML/MIN
GLUCOSE SERPL-MCNC: 151 MG/DL (ref 70–105)
GLUCOSE SERPL-MCNC: 170 MG/DL (ref 70–105)
HCT VFR BLD CALC: 36 % (ref 40–54)
HGB BLD-MCNC: 12.7 G/DL (ref 13.3–17.7)
LYMPHOCYTES # BLD AUTO: 1.3 10^3/UL (ref 1–4)
LYMPHOCYTES NFR BLD AUTO: 11 % (ref 12–44)
MAGNESIUM SERPL-MCNC: 2.2 MG/DL (ref 1.6–2.4)
MANUAL DIFFERENTIAL PERFORMED BLD QL: NO
MCH RBC QN AUTO: 28 PG (ref 25–34)
MCHC RBC AUTO-ENTMCNC: 36 G/DL (ref 32–36)
MCV RBC AUTO: 80 FL (ref 80–99)
MONOCYTES # BLD AUTO: 0.4 10^3/UL (ref 0–1)
MONOCYTES NFR BLD AUTO: 3 % (ref 0–12)
NEUTROPHILS # BLD AUTO: 9.7 10^3/UL (ref 1.8–7.8)
NEUTROPHILS NFR BLD AUTO: 85 % (ref 42–75)
PHOSPHATE SERPL-MCNC: 2.7 MG/DL (ref 2.3–4.7)
PLATELET # BLD: 217 10^3/UL (ref 130–400)
PMV BLD AUTO: 10.9 FL (ref 9–12.2)
POTASSIUM SERPL-SCNC: 3.2 MMOL/L (ref 3.6–5)
POTASSIUM SERPL-SCNC: 3.2 MMOL/L (ref 3.6–5)
SODIUM SERPL-SCNC: 136 MMOL/L (ref 135–145)
SODIUM SERPL-SCNC: 138 MMOL/L (ref 135–145)
WBC # BLD AUTO: 11.4 10^3/UL (ref 4.3–11)

## 2021-06-21 RX ADMIN — ALBUTEROL SULFATE SCH GM: 90 AEROSOL, METERED RESPIRATORY (INHALATION) at 03:08

## 2021-06-21 RX ADMIN — POTASSIUM CHLORIDE SCH MLS/HR: 200 INJECTION, SOLUTION INTRAVENOUS at 12:25

## 2021-06-21 RX ADMIN — POTASSIUM CHLORIDE SCH MLS/HR: 200 INJECTION, SOLUTION INTRAVENOUS at 10:23

## 2021-06-21 RX ADMIN — INSULIN ASPART SCH UNIT: 100 INJECTION, SOLUTION INTRAVENOUS; SUBCUTANEOUS at 20:02

## 2021-06-21 RX ADMIN — ALBUTEROL SULFATE SCH GM: 90 AEROSOL, METERED RESPIRATORY (INHALATION) at 21:42

## 2021-06-21 RX ADMIN — ENOXAPARIN SODIUM SCH MG: 100 INJECTION SUBCUTANEOUS at 20:02

## 2021-06-21 RX ADMIN — POTASSIUM CHLORIDE SCH MLS/HR: 200 INJECTION, SOLUTION INTRAVENOUS at 04:35

## 2021-06-21 RX ADMIN — INSULIN ASPART SCH UNIT: 100 INJECTION, SOLUTION INTRAVENOUS; SUBCUTANEOUS at 16:30

## 2021-06-21 RX ADMIN — ALBUTEROL SULFATE SCH GM: 90 AEROSOL, METERED RESPIRATORY (INHALATION) at 18:40

## 2021-06-21 RX ADMIN — ENOXAPARIN SODIUM SCH MG: 100 INJECTION SUBCUTANEOUS at 08:53

## 2021-06-21 RX ADMIN — POTASSIUM CHLORIDE SCH MLS/HR: 200 INJECTION, SOLUTION INTRAVENOUS at 11:23

## 2021-06-21 RX ADMIN — FLUTICASONE PROPIONATE AND SALMETEROL SCH EACH: 232; 14 POWDER, METERED RESPIRATORY (INHALATION) at 07:17

## 2021-06-21 RX ADMIN — POTASSIUM CHLORIDE SCH MEQ: 1500 TABLET, EXTENDED RELEASE ORAL at 04:36

## 2021-06-21 RX ADMIN — MAGNESIUM SULFATE IN DEXTROSE SCH MLS/HR: 10 INJECTION, SOLUTION INTRAVENOUS at 04:36

## 2021-06-21 RX ADMIN — SODIUM CHLORIDE SCH MLS/HR: 900 INJECTION, SOLUTION INTRAVENOUS at 08:53

## 2021-06-21 RX ADMIN — DOCUSATE SODIUM AND SENNOSIDES SCH EA: 8.6; 5 TABLET, FILM COATED ORAL at 08:53

## 2021-06-21 RX ADMIN — FLUTICASONE PROPIONATE AND SALMETEROL SCH EACH: 232; 14 POWDER, METERED RESPIRATORY (INHALATION) at 18:52

## 2021-06-21 RX ADMIN — ALPRAZOLAM PRN MG: 0.25 TABLET ORAL at 04:00

## 2021-06-21 RX ADMIN — SIMVASTATIN SCH MG: 10 TABLET, FILM COATED ORAL at 20:02

## 2021-06-21 RX ADMIN — DEXMEDETOMIDINE HYDROCHLORIDE SCH MLS/HR: 100 INJECTION, SOLUTION, CONCENTRATE INTRAVENOUS at 09:39

## 2021-06-21 RX ADMIN — POTASSIUM CHLORIDE SCH MLS/HR: 200 INJECTION, SOLUTION INTRAVENOUS at 00:40

## 2021-06-21 RX ADMIN — POTASSIUM CHLORIDE SCH MLS/HR: 200 INJECTION, SOLUTION INTRAVENOUS at 04:00

## 2021-06-21 RX ADMIN — INSULIN ASPART SCH UNIT: 100 INJECTION, SOLUTION INTRAVENOUS; SUBCUTANEOUS at 11:08

## 2021-06-21 RX ADMIN — DEXAMETHASONE SCH MG: 6 TABLET ORAL at 06:16

## 2021-06-21 RX ADMIN — INSULIN ASPART SCH UNIT: 100 INJECTION, SOLUTION INTRAVENOUS; SUBCUTANEOUS at 04:36

## 2021-06-21 RX ADMIN — FLUTICASONE PROPIONATE AND SALMETEROL SCH EACH: 232; 14 POWDER, METERED RESPIRATORY (INHALATION) at 18:47

## 2021-06-21 RX ADMIN — ALBUTEROL SULFATE SCH GM: 90 AEROSOL, METERED RESPIRATORY (INHALATION) at 13:38

## 2021-06-21 RX ADMIN — DOCUSATE SODIUM AND SENNOSIDES SCH EA: 8.6; 5 TABLET, FILM COATED ORAL at 19:46

## 2021-06-21 RX ADMIN — POTASSIUM CHLORIDE SCH MLS/HR: 200 INJECTION, SOLUTION INTRAVENOUS at 12:26

## 2021-06-21 RX ADMIN — ALBUTEROL SULFATE SCH GM: 90 AEROSOL, METERED RESPIRATORY (INHALATION) at 07:16

## 2021-06-21 RX ADMIN — ALBUTEROL SULFATE SCH GM: 90 AEROSOL, METERED RESPIRATORY (INHALATION) at 10:15

## 2021-06-21 NOTE — PULMONARY PROGRESS NOTE
Subjective


Date Seen by a Provider:  Jun 21, 2021


Time Seen by a Provider:  09:29


Subjective/Events-last exam


39 y/o with Hx of DM2, possible MARILY, non compliant with meds. Came in for COVID-

19


PNA with acute hypoxic resp failure.


Moved to ICU for increased WOB and worsening hypoxia. Was on high flow @ 40 kpm 

with FiO2 100%, now back on BiPAP  12/8 FiO2 90%.


Sats 97% on 100% O2.


Labs, CXR report reviewed.has bilateral opacities


Mild transaminitis


On Remdesivir and Decadron.


glu high,, on Lemivir 25 units, patient is NPO


RN reports pt is anxious On Xanax, but will try IV Precedex





Sepsis Event


Evaluation


Height, Weight, BMI


Height: 5'11"


Weight: 305lbs. oz. 138.291078re; 39.92 BMI


Method:Stated





Focused Exam


Lactate Level


6/19/21 12:50: Lactic Acid Level 1.97





Exam


Exam


Patient acknowledged, consented, and participated in this virtual visit which 

was conducted using real time audio/video


Vital Signs








  Date Time  Temp Pulse Resp B/P (MAP) Pulse Ox O2 Delivery O2 Flow Rate FiO2


 


6/21/21 07:17     91 Vapotherm 40.00 100


 


6/21/21 06:39  58      


 


6/21/21 06:00  62 30 112/67 (82) 93 NIV Bilevel 75.00 


 


6/21/21 05:00  57  114/68 (79) 95 NIV Bilevel 75.00 


 


6/21/21 04:00 37.6       


 


6/21/21 04:00  68  108/72 (81) 94 NIV Bilevel 75.00 


 


6/21/21 03:08  65 23  95  70.00 


 


6/21/21 03:05      NIV Bilevel  75


 


6/21/21 03:00  58 25 96/62 (71) 96 NIV Bilevel 75.00 


 


6/21/21 02:16      NIV Bilevel 75.00 


 


6/21/21 02:08  66 30 95/62 (69) 96 NIV Bilevel 90.00 


 


6/21/21 01:00  66  103/67 (79) 97 NIV Bilevel 90.00 


 


6/21/21 01:00  66      


 


6/21/21 00:30  69  111/64 (80) 94 NIV Bilevel 90.00 


 


6/21/21 00:00  69 27 111/54 (73) 98 NIV Bilevel 100.00 


 


6/21/21 00:00      NIV Bilevel  90


 


6/20/21 23:45  77 32 110/61 (73) 97 NIV Bilevel 100.00 


 


6/20/21 23:30  73 22 107/64 (74) 97 NIV Bilevel 100.00 


 


6/20/21 23:24 38.6       


 


6/20/21 23:15  80 28 107/59 (75) 97 NIV Bilevel 100.00 


 


6/20/21 23:00  85 27 105/60 (77) 96 NIV Bilevel 100.00 


 


6/20/21 22:59  80 34  97  100.00 


 


6/20/21 22:49  78 31 117/64 (81) 95 NIV Bilevel 100.00 


 


6/20/21 22:00  60 21 153/65 (94) 94 NIV Bilevel 100.00 


 


6/20/21 21:56  80      


 


6/20/21 21:56  80      


 


6/20/21 21:45  81 29 120/65 (83) 97 NIV Bilevel 100.00 


 


6/20/21 21:34 38.6 80 27 118/66 (83) 93 NIV Bilevel 100.00 


 


6/20/21 21:26  78 34  96  100.00 


 


6/20/21 21:16  95 29 128/71 (90) 88 Vapotherm 40.00 





       100.00 


 


6/20/21 20:30     92 Vapotherm 40.00 100


 


6/20/21 20:10     90 Vapotherm 40.00 





       100.00 


 


6/20/21 20:04 38.1 88 22 115/57 (76) 83 Vapotherm 25.00 





       80.00 


 


6/20/21 19:30     89 Vapotherm 40.00 100


 


6/20/21 16:46 36.9 81 20 116/55 (75) 92 Vapotherm 25.00 





       80.00 


 


6/20/21 15:50 37.4 67 20 108/72 93 Vapotherm 25.00 


 


6/20/21 14:04 37.5 60 20 115/70 93 Vapotherm 25.00 


 


6/20/21 13:58     94 Vapotherm 25.00 90


 


6/20/21 13:49 37.5 68 19 114/58 92 Vapotherm 25.00 


 


6/20/21 12:00 38.0 64 20 116/60 (78) 93 Vapotherm 25.00 





       90.00 


 


6/20/21 10:53     94 Vapotherm 25.00 





       90.00 


 


6/20/21 10:46     95 Vapotherm 25.00 90


 


6/20/21 10:16     89 Nasal Cannula 4.00 














I & O 


 


 6/21/21





 07:00


 


Intake Total 1110 ml


 


Output Total 600 ml


 


Balance 510 ml








Height & Weight


Height: 5'11"


Weight: 305lbs. oz. 138.776845vv; 39.92 BMI


Method:Stated


General Appearance:  No Apparent Distress, Anxious, Chronically ill, Obese


HEENT:  PERRL/EOMI, Normal ENT Inspection, Pharynx Normal, Moist Mucous 

Membranes


Neck:  Full Range of Motion, Normal Inspection, Non Tender


Respiratory:  Chest Non Tender, Lungs Clear, No Accessory Muscle Use, No 

Respiratory Distress, Decreased Breath Sounds


Cardiovascular:  Regular Rate, Rhythm, No Edema, No Gallop, No JVD, No Murmur, 

Normal Peripheral Pulses


Capillary Refill:  Less Than 3 Seconds


Gastrointestinal:  normal bowel sounds, non tender, soft


Extremity:  Normal Capillary Refill, Normal Inspection, Normal Range of Motion, 

Non Tender, No Calf Tenderness, No Pedal Edema


Neurologic/Psychiatric:  Alert, Oriented x3, No Motor/Sensory Deficits, Normal 

Mood/Affect


Skin:  Normal Color, Warm/Dry


Lymphatic:  No Adenopathy





Results


Lab


Laboratory Tests


6/19/21 12:50








6/20/21 05:20








6/20/21 21:20








6/21/21 03:42











Assessment/Plan


Assessment/Plan


Continue with BIPAP and wean O2 keeping sats 92-96%.


D/W the bedside nurse


will add IV precedex, monitor oxygenation, keep for BiPAP for now, continue 

COVID meds,


Time spent with patient (mins):  15











LAVINIA GALINDO MD             Jun 21, 2021 08:42

## 2021-06-21 NOTE — PROGRESS NOTE
Subjective


Subjective/Events-last exam


Afebrile, states his breathing is feeling a little better today than yesterday.





Focused Exam


Lactate Level


6/19/21 12:50: Lactic Acid Level 1.97





Objective


Exam


Last Set of Vital Signs





Vital Signs








  Date Time  Temp Pulse Resp B/P (MAP) Pulse Ox O2 Delivery O2 Flow Rate FiO2


 


6/21/21 13:38  48 30  93  85.00 


 


6/21/21 13:00    126/93 (104)  NIV Bilevel  


 


6/21/21 12:05        100


 


6/21/21 12:00 36.9       





Capillary Refill : Less Than 3 Seconds


I&O











Intake and Output 


 


 6/21/21





 00:00


 


Intake Total 1610 ml


 


Output Total 700 ml


 


Balance 910 ml


 


 


 


Intake Oral 1360 ml


 


IV Total 250 ml


 


Output Urine Total 700 ml


 


# Voids 1


 


# Bowel Movements 2








General:  Alert, No Acute Distress


Lungs:  Other (rales)


Heart:  Regular Rate, No Murmurs


Neuro:  Normal Speech





Results/Procedures


Lab


Laboratory Tests


6/20/21 16:47: Glucometer 364H


6/20/21 20:02: Glucometer 407*H


6/20/21 21:20: 


White Blood Count 12.8H, Red Blood Count 4.44, Hemoglobin 12.7L, Hematocrit 36L,

Mean Corpuscular Volume 80, Mean Corpuscular Hemoglobin 29, Mean Corpuscular 

Hemoglobin Concent 36, Red Cell Distribution Width 12.3, Platelet Count 215, 

Mean Platelet Volume 11.5, Immature Granulocyte % (Auto) 1, Neutrophils (%) 

(Auto) 90H, Lymphocytes (%) (Auto) 7L, Monocytes (%) (Auto) 3, Eosinophils (%) 

(Auto) 0, Basophils (%) (Auto) 0, Neutrophils # (Auto) 11.5H, Lymphocytes # 

(Auto) 0.9L, Monocytes # (Auto) 0.3, Eosinophils # (Auto) 0.0, Basophils # 

(Auto) 0.0, Immature Granulocyte # (Auto) 0.1, Neutrophils % (Manual) 90, 

Lymphocytes % (Manual) 8, Monocytes % (Manual) 2, Blood Morphology Comment 

NORMAL, Sodium Level 133L, Potassium Level 3.0L, Chloride Level 96L, Carbon 

Dioxide Level 23, Anion Gap 14, Blood Urea Nitrogen 19H, Creatinine 1.23, 

Estimat Glomerular Filtration Rate > 60, BUN/Creatinine Ratio 15, Glucose Level 

417*H, Calcium Level 8.1L, Corrected Calcium 8.7, Total Bilirubin 1.0, Aspartate

Amino Transf (AST/SGOT) 50H, Alanine Aminotransferase (ALT/SGPT) 50, Alkaline 

Phosphatase 39L, Total Protein 6.3L, Albumin 3.2, Procalcitonin 0.28H


6/20/21 21:43: 


Blood Gas Puncture Site RIGHT RADIAL, Blood Gas Patient Temperature 38.6, Denise

rial Blood pH 7.51H, Arterial Blood Partial Pressure CO2 33L, Arterial Blood 

Partial Pressure O2 85, Arterial Blood HCO3 25, Arterial Blood Total CO2 26.2, 

Arterial Blood Oxygen Saturation 96, Arterial Blood Base Excess 2.5, Gunnar Test 

POSITIVE, Blood Gas Ventilator Setting NO, Blood Gas Inspired Oxygen 100%


6/21/21 03:42: 


White Blood Count 11.4H, Red Blood Count 4.47, Hemoglobin 12.7L, Hematocrit 36L,

Mean Corpuscular Volume 80, Mean Corpuscular Hemoglobin 28, Mean Corpuscular 

Hemoglobin Concent 36, Red Cell Distribution Width 12.2, Platelet Count 217, 

Mean Platelet Volume 10.9, Immature Granulocyte % (Auto) 1, Neutrophils (%) 

(Auto) 85H, Lymphocytes (%) (Auto) 11L, Monocytes (%) (Auto) 3, Eosinophils (%) 

(Auto) 0, Basophils (%) (Auto) 0, Neutrophils # (Auto) 9.7H, Lymphocytes # 

(Auto) 1.3, Monocytes # (Auto) 0.4, Eosinophils # (Auto) 0.0, Basophils # (Auto)

0.0, Immature Granulocyte # (Auto) 0.1, Sodium Level 138, Potassium Level 3.2L, 

Chloride Level 100, Carbon Dioxide Level 24, Anion Gap 14, Blood Urea Nitrogen 

18, Creatinine 0.96, Estimat Glomerular Filtration Rate > 60, BUN/Creatinine 

Ratio 19, Glucose Level 151H, Calcium Level 7.9L, Phosphorus Level 2.7, Magnesi

um Level 2.2


6/21/21 09:15: 


Sodium Level 136, Potassium Level 3.2L, Chloride Level 101, Carbon Dioxide Level

25, Anion Gap 10, Blood Urea Nitrogen 17, Creatinine 0.78, Estimat Glomerular 

Filtration Rate > 60, BUN/Creatinine Ratio 22, Glucose Level 170H, Calcium Level

8.0L


6/21/21 10:22: Glucometer 184H


6/21/21 13:42: Lab Scanned Report Transfusion Reaction Form





Microbiology


6/19/21 Blood Culture - Preliminary, Resulted


          No growth





Assessment/Plan


Assessment/Plan





(1) COVID-19


Status:  Acute


Assessment & Plan:  s/p convalescent plasma, receiving dexamethasone. D/C 

remdesevir due to significant hypoxia/need for bipap. Appreciate Tele ICU 

recommendations. D dimer minimally elevated.





(2) Acute respiratory failure


Status:  Acute


Assessment & Plan:  Secondary to COVID19, requiring bipap, appreciate tele ICU 

assistance.


Qualifiers:  


   Qualified Codes:  J96.01 - Acute respiratory failure with hypoxia


(3) Diabetes mellitus, type 2


Status:  Chronic


Assessment & Plan:  On levemir 25 units BID inpatient, holding home glipizide, 

pioglitazone and canagliflozin. Sliding scale insulin.


Qualifiers:  


   Qualified Codes:  E11.65 - Type 2 diabetes mellitus with hyperglycemia


(4) Hypertension


Status:  Chronic


Assessment & Plan:  Hold home lisinopril/HCTZ with low normal BP


Qualifiers:  


   Qualified Codes:  I10 - Essential (primary) hypertension


(5) Obesity (BMI 30-39.9)


Status:  Chronic


(6) DVT prophylaxis


Status:  Acute


Assessment & Plan:  Enoxaparin














CATRINA CHAVEZ MD             Jun 21, 2021 14:22

## 2021-06-22 VITALS — SYSTOLIC BLOOD PRESSURE: 144 MMHG | DIASTOLIC BLOOD PRESSURE: 73 MMHG

## 2021-06-22 VITALS — DIASTOLIC BLOOD PRESSURE: 55 MMHG | SYSTOLIC BLOOD PRESSURE: 125 MMHG

## 2021-06-22 VITALS — SYSTOLIC BLOOD PRESSURE: 146 MMHG | DIASTOLIC BLOOD PRESSURE: 73 MMHG

## 2021-06-22 VITALS — SYSTOLIC BLOOD PRESSURE: 149 MMHG | DIASTOLIC BLOOD PRESSURE: 76 MMHG

## 2021-06-22 VITALS — SYSTOLIC BLOOD PRESSURE: 136 MMHG | DIASTOLIC BLOOD PRESSURE: 72 MMHG

## 2021-06-22 VITALS — DIASTOLIC BLOOD PRESSURE: 78 MMHG | SYSTOLIC BLOOD PRESSURE: 125 MMHG

## 2021-06-22 VITALS — SYSTOLIC BLOOD PRESSURE: 157 MMHG | DIASTOLIC BLOOD PRESSURE: 84 MMHG

## 2021-06-22 VITALS — DIASTOLIC BLOOD PRESSURE: 88 MMHG | SYSTOLIC BLOOD PRESSURE: 134 MMHG

## 2021-06-22 VITALS — DIASTOLIC BLOOD PRESSURE: 72 MMHG | SYSTOLIC BLOOD PRESSURE: 137 MMHG

## 2021-06-22 VITALS — DIASTOLIC BLOOD PRESSURE: 86 MMHG | SYSTOLIC BLOOD PRESSURE: 141 MMHG

## 2021-06-22 VITALS — SYSTOLIC BLOOD PRESSURE: 140 MMHG | DIASTOLIC BLOOD PRESSURE: 66 MMHG

## 2021-06-22 VITALS — SYSTOLIC BLOOD PRESSURE: 133 MMHG | DIASTOLIC BLOOD PRESSURE: 89 MMHG

## 2021-06-22 VITALS — DIASTOLIC BLOOD PRESSURE: 76 MMHG | SYSTOLIC BLOOD PRESSURE: 151 MMHG

## 2021-06-22 VITALS — SYSTOLIC BLOOD PRESSURE: 142 MMHG | DIASTOLIC BLOOD PRESSURE: 74 MMHG

## 2021-06-22 VITALS — SYSTOLIC BLOOD PRESSURE: 130 MMHG | DIASTOLIC BLOOD PRESSURE: 56 MMHG

## 2021-06-22 VITALS — DIASTOLIC BLOOD PRESSURE: 72 MMHG | SYSTOLIC BLOOD PRESSURE: 142 MMHG

## 2021-06-22 VITALS — SYSTOLIC BLOOD PRESSURE: 139 MMHG | DIASTOLIC BLOOD PRESSURE: 75 MMHG

## 2021-06-22 VITALS — SYSTOLIC BLOOD PRESSURE: 141 MMHG | DIASTOLIC BLOOD PRESSURE: 71 MMHG

## 2021-06-22 VITALS — SYSTOLIC BLOOD PRESSURE: 150 MMHG | DIASTOLIC BLOOD PRESSURE: 76 MMHG

## 2021-06-22 VITALS — DIASTOLIC BLOOD PRESSURE: 74 MMHG | SYSTOLIC BLOOD PRESSURE: 148 MMHG

## 2021-06-22 VITALS — SYSTOLIC BLOOD PRESSURE: 134 MMHG | DIASTOLIC BLOOD PRESSURE: 73 MMHG

## 2021-06-22 VITALS — SYSTOLIC BLOOD PRESSURE: 132 MMHG | DIASTOLIC BLOOD PRESSURE: 76 MMHG

## 2021-06-22 VITALS — SYSTOLIC BLOOD PRESSURE: 147 MMHG | DIASTOLIC BLOOD PRESSURE: 74 MMHG

## 2021-06-22 VITALS — DIASTOLIC BLOOD PRESSURE: 89 MMHG | SYSTOLIC BLOOD PRESSURE: 133 MMHG

## 2021-06-22 VITALS — DIASTOLIC BLOOD PRESSURE: 67 MMHG | SYSTOLIC BLOOD PRESSURE: 140 MMHG

## 2021-06-22 VITALS — DIASTOLIC BLOOD PRESSURE: 71 MMHG | SYSTOLIC BLOOD PRESSURE: 128 MMHG

## 2021-06-22 VITALS — SYSTOLIC BLOOD PRESSURE: 131 MMHG | DIASTOLIC BLOOD PRESSURE: 72 MMHG

## 2021-06-22 LAB
APTT PPP: YELLOW S
ARTERIAL PATENCY WRIST A: (no result)
ARTERIAL PATENCY WRIST A: (no result)
BACTERIA #/AREA URNS HPF: (no result) /HPF
BASE EXCESS STD BLDA CALC-SCNC: -0.2 MMOL/L (ref -2.5–2.5)
BASE EXCESS STD BLDA CALC-SCNC: 2.5 MMOL/L (ref -2.5–2.5)
BASOPHILS # BLD AUTO: 0 10^3/UL (ref 0–0.1)
BASOPHILS NFR BLD AUTO: 0 % (ref 0–10)
BDY SITE: (no result)
BDY SITE: (no result)
BILIRUB UR QL STRIP: NEGATIVE
BODY TEMPERATURE: 37
BODY TEMPERATURE: 99.9
BUN/CREAT SERPL: 18
CALCIUM SERPL-MCNC: 9.8 MG/DL (ref 8.5–10.1)
CHLORIDE SERPL-SCNC: 102 MMOL/L (ref 98–107)
CO2 BLDA CALC-SCNC: 24.9 MMOL/L (ref 21–31)
CO2 BLDA CALC-SCNC: 26.8 MMOL/L (ref 21–31)
CO2 SERPL-SCNC: 26 MMOL/L (ref 21–32)
CREAT SERPL-MCNC: 0.95 MG/DL (ref 0.6–1.3)
EOSINOPHIL # BLD AUTO: 0 10^3/UL (ref 0–0.3)
EOSINOPHIL NFR BLD AUTO: 0 % (ref 0–10)
FIBRINOGEN PPP-MCNC: CLEAR MG/DL
GFR SERPLBLD BASED ON 1.73 SQ M-ARVRAT: > 60 ML/MIN
GLUCOSE SERPL-MCNC: 173 MG/DL (ref 70–105)
GLUCOSE UR STRIP-MCNC: (no result) MG/DL
HCT VFR BLD CALC: 42 % (ref 40–54)
HGB BLD-MCNC: 14.5 G/DL (ref 13.3–17.7)
HYALINE CASTS #/AREA URNS LPF: (no result) /LPF
INHALED O2 FLOW RATE: (no result) L/MIN
INHALED O2 FLOW RATE: (no result) L/MIN
KETONES UR QL STRIP: NEGATIVE
LEUKOCYTE ESTERASE UR QL STRIP: NEGATIVE
LYMPHOCYTES # BLD AUTO: 1.3 10^3/UL (ref 1–4)
LYMPHOCYTES NFR BLD AUTO: 9 % (ref 12–44)
MAGNESIUM SERPL-MCNC: 2.4 MG/DL (ref 1.6–2.4)
MANUAL DIFFERENTIAL PERFORMED BLD QL: NO
MCH RBC QN AUTO: 28 PG (ref 25–34)
MCHC RBC AUTO-ENTMCNC: 34 G/DL (ref 32–36)
MCV RBC AUTO: 82 FL (ref 80–99)
MONOCYTES # BLD AUTO: 0.5 10^3/UL (ref 0–1)
MONOCYTES NFR BLD AUTO: 4 % (ref 0–12)
NEUTROPHILS # BLD AUTO: 12.7 10^3/UL (ref 1.8–7.8)
NEUTROPHILS NFR BLD AUTO: 86 % (ref 42–75)
NITRITE UR QL STRIP: NEGATIVE
PCO2 BLDA: 36 MMHG (ref 35–45)
PCO2 BLDA: 38 MMHG (ref 35–45)
PH BLDA: 7.42 [PH] (ref 7.37–7.43)
PH BLDA: 7.47 [PH] (ref 7.37–7.43)
PH UR STRIP: 6 [PH] (ref 5–9)
PHOSPHATE SERPL-MCNC: 3.5 MG/DL (ref 2.3–4.7)
PLATELET # BLD: 307 10^3/UL (ref 130–400)
PMV BLD AUTO: 10.7 FL (ref 9–12.2)
PO2 BLDA: 53 MMHG (ref 79–93)
PO2 BLDA: 60 MMHG (ref 79–93)
POTASSIUM SERPL-SCNC: 4.1 MMOL/L (ref 3.6–5)
PROT UR QL STRIP: NEGATIVE
RBC #/AREA URNS HPF: (no result) /HPF
SAO2 % BLDA FROM PO2: 88 % (ref 94–100)
SAO2 % BLDA FROM PO2: 92 % (ref 94–100)
SODIUM SERPL-SCNC: 140 MMOL/L (ref 135–145)
SP GR UR STRIP: 1.02 (ref 1.02–1.02)
URATE CRY #/AREA URNS LPF: (no result) /LPF
VENTILATION MODE VENT: NO
VENTILATION MODE VENT: YES
WBC # BLD AUTO: 14.8 10^3/UL (ref 4.3–11)
WBC #/AREA URNS HPF: (no result) /HPF

## 2021-06-22 PROCEDURE — 5A1955Z RESPIRATORY VENTILATION, GREATER THAN 96 CONSECUTIVE HOURS: ICD-10-PCS | Performed by: INTERNAL MEDICINE

## 2021-06-22 PROCEDURE — 0BH17EZ INSERTION OF ENDOTRACHEAL AIRWAY INTO TRACHEA, VIA NATURAL OR ARTIFICIAL OPENING: ICD-10-PCS | Performed by: INTERNAL MEDICINE

## 2021-06-22 RX ADMIN — INSULIN ASPART SCH UNIT: 100 INJECTION, SOLUTION INTRAVENOUS; SUBCUTANEOUS at 22:50

## 2021-06-22 RX ADMIN — PROPOFOL SCH MLS/HR: 10 INJECTION, EMULSION INTRAVENOUS at 10:04

## 2021-06-22 RX ADMIN — ALBUTEROL SULFATE SCH GM: 90 AEROSOL, METERED RESPIRATORY (INHALATION) at 07:01

## 2021-06-22 RX ADMIN — ALBUTEROL SULFATE SCH GM: 90 AEROSOL, METERED RESPIRATORY (INHALATION) at 10:48

## 2021-06-22 RX ADMIN — ALBUTEROL SULFATE SCH GM: 90 AEROSOL, METERED RESPIRATORY (INHALATION) at 21:56

## 2021-06-22 RX ADMIN — DEXMEDETOMIDINE HYDROCHLORIDE SCH MLS/HR: 100 INJECTION, SOLUTION, CONCENTRATE INTRAVENOUS at 19:56

## 2021-06-22 RX ADMIN — Medication SCH MLS/HR: at 12:23

## 2021-06-22 RX ADMIN — ALBUTEROL SULFATE SCH GM: 90 AEROSOL, METERED RESPIRATORY (INHALATION) at 02:11

## 2021-06-22 RX ADMIN — FLUTICASONE PROPIONATE AND SALMETEROL SCH EACH: 232; 14 POWDER, METERED RESPIRATORY (INHALATION) at 10:48

## 2021-06-22 RX ADMIN — ALBUTEROL SULFATE SCH GM: 90 AEROSOL, METERED RESPIRATORY (INHALATION) at 18:33

## 2021-06-22 RX ADMIN — ENOXAPARIN SODIUM SCH MG: 100 INJECTION SUBCUTANEOUS at 08:13

## 2021-06-22 RX ADMIN — PROPOFOL SCH MLS/HR: 10 INJECTION, EMULSION INTRAVENOUS at 22:51

## 2021-06-22 RX ADMIN — POTASSIUM CHLORIDE SCH MLS/HR: 200 INJECTION, SOLUTION INTRAVENOUS at 05:40

## 2021-06-22 RX ADMIN — FLUTICASONE PROPIONATE AND SALMETEROL SCH EACH: 232; 14 POWDER, METERED RESPIRATORY (INHALATION) at 21:56

## 2021-06-22 RX ADMIN — FENTANYL CITRATE PRN MCG: 50 INJECTION, SOLUTION INTRAMUSCULAR; INTRAVENOUS at 09:12

## 2021-06-22 RX ADMIN — SIMVASTATIN SCH MG: 10 TABLET, FILM COATED ORAL at 20:00

## 2021-06-22 RX ADMIN — INSULIN ASPART SCH UNIT: 100 INJECTION, SOLUTION INTRAVENOUS; SUBCUTANEOUS at 17:13

## 2021-06-22 RX ADMIN — INSULIN ASPART SCH UNIT: 100 INJECTION, SOLUTION INTRAVENOUS; SUBCUTANEOUS at 05:40

## 2021-06-22 RX ADMIN — DOCUSATE SODIUM AND SENNOSIDES SCH EA: 8.6; 5 TABLET, FILM COATED ORAL at 09:42

## 2021-06-22 RX ADMIN — DEXMEDETOMIDINE HYDROCHLORIDE SCH MLS/HR: 100 INJECTION, SOLUTION, CONCENTRATE INTRAVENOUS at 10:04

## 2021-06-22 RX ADMIN — ENOXAPARIN SODIUM SCH MG: 100 INJECTION SUBCUTANEOUS at 20:00

## 2021-06-22 RX ADMIN — MAGNESIUM SULFATE IN DEXTROSE SCH MLS/HR: 10 INJECTION, SOLUTION INTRAVENOUS at 05:40

## 2021-06-22 RX ADMIN — ALBUTEROL SULFATE SCH GM: 90 AEROSOL, METERED RESPIRATORY (INHALATION) at 14:57

## 2021-06-22 RX ADMIN — INSULIN ASPART SCH UNIT: 100 INJECTION, SOLUTION INTRAVENOUS; SUBCUTANEOUS at 11:09

## 2021-06-22 RX ADMIN — DOCUSATE SODIUM AND SENNOSIDES SCH EA: 8.6; 5 TABLET, FILM COATED ORAL at 19:59

## 2021-06-22 RX ADMIN — PROPOFOL SCH MLS/HR: 10 INJECTION, EMULSION INTRAVENOUS at 16:51

## 2021-06-22 RX ADMIN — POTASSIUM CHLORIDE SCH MEQ: 1500 TABLET, EXTENDED RELEASE ORAL at 05:40

## 2021-06-22 RX ADMIN — DEXAMETHASONE SCH MG: 6 TABLET ORAL at 06:01

## 2021-06-22 NOTE — PULMONARY PROGRESS NOTE
Subjective


Date Seen by a Provider:  2021


Time Seen by a Provider:  12:04





Sepsis Event


Evaluation


Height, Weight, BMI


Height: 5'11"


Weight: 305lbs. oz. 138.073624wk; 39.92 BMI


Method:Stated





Focused Exam


Lactate Level


21 12:50: Lactic Acid Level 1.97





Exam


Exam


Patient acknowledged, consented, and participated in this virtual visit which 

was conducted using real time audio/video


Vital Signs








  Date Time  Temp Pulse Resp B/P (MAP) Pulse Ox O2 Delivery O2 Flow Rate FiO2


 


21 11:00  59 26 140/66 (90) 93 Mechanical Ventilator 100.00 


 


21 10:49  60 26  94   100


 


21 10:04     85   


 


21 10:04  78  134/73    


 


21 10:04  65  134/74    


 


21 10:00  67 26 125/78 (94) 84 Mechanical Ventilator 100.00 


 


21 09:18  78 26  79   100


 


21 09:00  68   88 Mechanical Ventilator 100.00 


 


21 08:43  78 19  79   100


 


21 08:11      NIV Bilevel  100


 


21 08:00  64 32 134/88 (103) 88 NIV Bilevel 100.00 


 


21 07:03  61 38  91  100.00 


 


21 07:00  58 52 133/89 (104) 92 NIV Bilevel 100.00 


 


21 06:34  64      


 


21 06:06  62 19 141/86 (103) 93 NIV Bilevel 100.00 


 


21 05:00  58  131/72 (91) 91 NIV Bilevel 100.00 


 


21 04:00  61 35 157/84 (107) 92 NIV Bilevel 100.00 


 


21 03:01      NIV Bilevel  100


 


21 03:00  60  142/74 (96) 93 NIV Bilevel 100.00 


 


21 03:00 38.1       


 


21 02:11  56 33  93  100.00 


 


21 02:00  58 39 132/76 (94) 87 NIV Bilevel 100.00 


 


21 01:00  55  136/72 (93) 93 NIV Bilevel 100.00 


 


21 01:00  55      


 


21 00:00 37.5       


 


21 00:00  61 36 128/71 (90) 88 NIV Bilevel 100.00 


 


21 23:31      NIV Bilevel  100


 


21 23:00  64 35 106/67 (80) 93 NIV Bilevel 100.00 


 


21 22:00  67 28 127/72 (88) 89 NIV Bilevel 100.00 


 


21 21:42  61 32  90  100.00 


 


21 21:00  67 33 141/81 (98) 91 NIV Bilevel 100.00 


 


21 20:38      NIV Bilevel  100


 


21 20:00  86 29 134/77 (93) 88 NIV Bilevel 100.00 


 


21 19:55 37.6 60 32 149/95 (113) 91 NIV Bilevel 80.00 


 


21 19:00  65      


 


21 19:00  65 28 149/95 (113) 91 NIV Bilevel 80.00 


 


21 18:41  67 34  91  80.00 


 


21 18:00  64 38 149/88 (108) 93 NIV Bilevel 80.00 


 


21 17:00  57 37 154/88 (110) 92 NIV Bilevel 80.00 


 


21 16:31      NIV Bilevel  85


 


21 16:24      NIV Bilevel 80.00 


 


21 16:00  62 18 168/126 (140) 98 NIV Bilevel 85.00 


 


21 15:47 36.4       


 


21 15:13     92 Vapotherm 40.00 100


 


21 15:00  49 15 149/102 (118) 96 NIV Bilevel 85.00 


 


21 14:00  61 39 129/79 (96) 86 NIV Bilevel 85.00 


 


21 13:38  48 30  93  85.00 


 


21 13:00  55 29 126/93 (104) 93 NIV Bilevel 75.00 


 


21 12:51  52      


 


21 12:05      NIV Bilevel  100














I & O 


 


 21





 06:59


 


Intake Total 1100 ml


 


Output Total 1125 ml


 


Balance -25 ml








Height & Weight


Height: 5'11"


Weight: 305lbs. oz. 138.779217dz; 39.92 BMI


Method:Stated


General Appearance:  No Apparent Distress, Anxious, Chronically ill, Obese


HEENT:  PERRL/EOMI, Normal ENT Inspection, Pharynx Normal, Moist Mucous 

Membranes


Neck:  Full Range of Motion, Normal Inspection, Non Tender


Respiratory:  Chest Non Tender, Lungs Clear, No Accessory Muscle Use, No 

Respiratory Distress, Decreased Breath Sounds


Cardiovascular:  Regular Rate, Rhythm, No Edema, No Gallop, No JVD, No Murmur, 

Normal Peripheral Pulses


Capillary Refill:  Less Than 3 Seconds


Gastrointestinal:  normal bowel sounds, non tender, soft


Extremity:  Normal Capillary Refill, Normal Inspection, Normal Range of Motion, 

Non Tender, No Calf Tenderness, No Pedal Edema


Neurologic/Psychiatric:  Alert, Oriented x3, No Motor/Sensory Deficits, Normal 

Mood/Affect


Skin:  Normal Color, Warm/Dry


Lymphatic:  No Adenopathy





Results


Lab


Laboratory Tests


21 21:20








21 03:42








21 09:15








21 03:35











Assessment/Plan


Assessment/Plan


(Tele-ICU Physician , Progress Note ) 





 


Available chart/ vitals / labs / Images reviewed 


Video assessment done using  teleICU camera, rest of exam as per RN 


Discussed with RN  


Events overnight :   





low grade fever 


hemodynamically stable, no pressors, I/O = neg 170


Drips:  propofol


As per RN exam :  





Consultants:  





CXR r  eport reviewed.has bilateral opacities


 -   increased RLL density - atelectais ?





blood cx  - neg


sputum 


Urine 





Hospital course: 


=39 y/o with Hx of DM2, possible MARILY, non compliant with meds. Came in for 

COVID-19


-ICU for increased WOB and worsening hypoxia. Was on high flow @ 40 kpm with

FiO2 100%, now back on BiPAP  12/8 FiO2 90%.


- BiPAP  15/8 FiO2 100%. rr 28 tv 800, MV 28L= > intubated 


 





A/P 


Acute reps failur - , hypoxic , Covid PNA


- BiPAP  15/8 FiO2 100%. rr 28 tv 800, MV 28L- discussed with patient and RN in 

room - patient is getting tired with this WOB ,  looks laboured 


-  decided to intubate , patient agreed - intubated without complications , 

will attempt proning latter today  2pm - 5 am . RLL atelectasis  - to follow 





COVID19


 Remdesivir 


 Decadron PO 6 - to IV  , increased dose 


 ddimet 0.5    -> lovenox proph dose 





DM II 


- levemir 25, ISShold levemir today - follow on NPO 





Leukocytosis


- ? steroids 


- follow sputum cx and PCT , RLL atelectasis vs infiltrate - off ABX 





possible MARILY





Lines :    PICC , (Central Line Necessity Reviewed) 





Balderas: 


O/22


Nutrition:  not yet 


Analgesia:  fentanl prn 





Anxiety/ delirium  = propofol , intubated  = AAO 





VTE Prophylaxis:  Lov 40 q12


Stress Ulcer Prophylaxis:  PPI


Glycemic Control:  +








Plans in collaboration with  bedside consultants and IM MDs. 


Discussed with Dr. Randhawa


Discussed with RN to reach out if any questions or concerns 





A total of  45 minutes of critical care time was devoted to this patient today, 

required to treat and/or prevent further deterioration of critical care 

condition ( as above ) .











CALVIN VASQUEZ MD         2021 12:04

## 2021-06-22 NOTE — PROGRESS NOTE
Subjective


Subjective/Events-last exam


Continued worsening hypoxia and work of breathing in spite of being on bipap at 

100% FiO2, Nevaeh discussed intubation with him this morning and he agreed. Just 

intubated shortly prior to my exam, and he is still restless and moving head 

side to side, sedation being adjusted.





Focused Exam


Lactate Level


6/19/21 12:50: Lactic Acid Level 1.97





Objective


Exam


Last Set of Vital Signs





Vital Signs








  Date Time  Temp Pulse Resp B/P (MAP) Pulse Ox O2 Delivery O2 Flow Rate FiO2


 


6/22/21 11:00  59 26 140/66 (90) 93 Mechanical Ventilator 100.00 


 


6/22/21 10:49        100


 


6/22/21 03:00 38.1       





Capillary Refill : Less Than 3 Seconds


I&O











Intake and Output 


 


 6/22/21





 00:00


 


Intake Total 1000 ml


 


Output Total 1175 ml


 


Balance -175 ml


 


 


 


Intake Oral 320 ml


 


IV Total 680 ml


 


Output Urine Total 1175 ml


 


# Voids 1








General:  Moderate Distress (sedated and intubated, but still restless)


Lungs:  Other (ronchi throughout)


Heart:  Regular Rate


Abdomen:  Normal Bowel Sounds, Soft


Extremities:  No Edema


Skin:  Other (diaphoretic)





Results/Procedures


Lab


Laboratory Tests


6/21/21 13:42: Lab Scanned Report Transfusion Reaction Form


6/21/21 15:23: Glucometer 211H


6/21/21 20:01: Glucometer 220H


6/22/21 03:35: 


White Blood Count 14.8H, Red Blood Count 5.17, Hemoglobin 14.5, Hematocrit 42, 

Mean Corpuscular Volume 82, Mean Corpuscular Hemoglobin 28, Mean Corpuscular 

Hemoglobin Concent 34, Red Cell Distribution Width 12.5, Platelet Count 307, 

Mean Platelet Volume 10.7, Immature Granulocyte % (Auto) 1, Neutrophils (%) 

(Auto) 86H, Lymphocytes (%) (Auto) 9L, Monocytes (%) (Auto) 4, Eosinophils (%) 

(Auto) 0, Basophils (%) (Auto) 0, Neutrophils # (Auto) 12.7H, Lymphocytes # 

(Auto) 1.3, Monocytes # (Auto) 0.5, Eosinophils # (Auto) 0.0, Basophils # (Auto)

0.0, Immature Granulocyte # (Auto) 0.2H, Sodium Level 140, Potassium Level 4.1, 

Chloride Level 102, Carbon Dioxide Level 26, Anion Gap 12, Blood Urea Nitrogen 

17, Creatinine 0.95, Estimat Glomerular Filtration Rate > 60, BUN/Creatinine 

Ratio 18, Glucose Level 173H, Calcium Level 9.8, Phosphorus Level 3.5, Magnesium

Level 2.4


6/22/21 03:55: Procalcitonin 0.40H


6/22/21 04:38: 


Blood Gas Puncture Site L RAD, Blood Gas Patient Temperature 99.9, Arterial 

Blood pH 7.47H, Arterial Blood Partial Pressure CO2 36, Arterial Blood Partial 

Pressure O2 60L, Arterial Blood HCO3 26, Arterial Blood Total CO2 26.8, Arterial

Blood Oxygen Saturation 92L, Arterial Blood Base Excess 2.5, Gunnar Test YES-POS,

Blood Gas Ventilator Setting NO, Blood Gas Inspired Oxygen 100% BIPAP


6/22/21 09:11: Glucometer 200H


6/22/21 10:05: 


Blood Gas Puncture Site RT RADIAL ART LINE, Blood Gas Patient Temperature 37.0, 

Arterial Blood pH 7.42, Arterial Blood Partial Pressure CO2 38, Arterial Blood 

Partial Pressure O2 53L, Arterial Blood HCO3 24, Arterial Blood Total CO2 24.9, 

Arterial Blood Oxygen Saturation 88L, Arterial Blood Base Excess -0.2, Gunnar 

Test YES-POS, Blood Gas Ventilator Setting YES, Blood Gas Inspired Oxygen 100%


6/22/21 11:08: Glucometer 237H





Microbiology


6/20/21 MRSA Screen - Final, Complete


          MRSA not isolated


6/19/21 Blood Culture - Preliminary, Resulted


          No growth





Assessment/Plan


Assessment/Plan





(1) COVID-19


Status:  Acute


Assessment & Plan:  s/p convalescent plasma, receiving dexamethasone. D/C 

remdesevir due to significant hypoxia/need for bipap. Appreciate Tele ICU 

recommendations. D dimer minimally elevated.





(2) Acute respiratory failure


Status:  Acute


Assessment & Plan:  Secondary to COVID19, requiring bipap, appreciate tele ICU 

assistance. 


6/22 continued worsening, required intubation and mechanical ventilation 

starting this am, appreciate Nevaeh recommendations.


Qualifiers:  


   Qualified Codes:  J96.01 - Acute respiratory failure with hypoxia


(3) Diabetes mellitus, type 2


Status:  Chronic


Assessment & Plan:  On levemir 25 units BID inpatient, holding home glipizide, 

pioglitazone and canagliflozin. Sliding scale insulin.


6/22 glucose all above 200, will increase levemir to 28 units BID.


Qualifiers:  


   Qualified Codes:  E11.65 - Type 2 diabetes mellitus with hyperglycemia


(4) Hypertension


Status:  Chronic


Assessment & Plan:  Hold home lisinopril/HCTZ with low normal BP


6/22 BP increased but not significantly hypertensive, monitor and resume a

ntihypertensive as needed


Qualifiers:  


   Qualified Codes:  I10 - Essential (primary) hypertension


(5) Obesity (BMI 30-39.9)


Status:  Chronic


(6) DVT prophylaxis


Status:  Acute


Assessment & Plan:  Enoxaparin














CATRINA CHAVEZ MD             Jun 22, 2021 11:33

## 2021-06-22 NOTE — PHYSICAL THERAPY PROGRESS NOTE
Therapy Progress Note


Patient just intubated and sedated this a.m.  PT will continue to monitor 

patient status and initiate treatment when patient is able to actively 

participate with skilled therapy.











LORI SY PT              Jun 22, 2021 09:34

## 2021-06-22 NOTE — DIAGNOSTIC IMAGING REPORT
INDICATION:  Intubation, COVID positive.  



TECHNIQUE:  Single view chest 9:22 AM.



CORRELATION STUDY:  06/20/2021



FINDINGS: 

Endotracheal tube has been placed, tip projecting over the

trachea interposed between the clavicles. Gastric tube passes

beneath left hemidiaphragm. There is very limited, suboptimal

depth of inspiration. Scattered bilateral pulmonary opacities are

again demonstrated but appear to be overall less pronounced from

prior. Asymmetrically elevated right diaphragm. Underlying right

pleural effusion not excluded.  Heart size remains enlarged.

Vasculature appears stable.



IMPRESSION: 

1. Interval intubation.

2. Bilateral pulmonary opacities again demonstrated but overall

appear perhaps slightly diminished and improved.

3. There is new elevated right diaphragm with what appears to be

small right pleural effusion. Given asymmetry, possibly of

underlying mucous plugging is not excluded. Followup imaging

recommended.



Dictated by: 



  Dictated on workstation # [id number]

## 2021-06-22 NOTE — ANESTHESIA-PROCEDURE NOTE
Procedures/Interventions


Procedure Start/Stop/Diagnosis


Date of Procedure:  Jun 22, 2021


Start Time:  08:30


Stop Time:  09:00





Intubation


RSI:  Yes


100% pre-Ox, [id number]:  Yes


Intubation Method:  orotracheal


Videoscope used:  Yes


Grade View:  1


Medications:  Propofol (200), Rocuronium (50), Succinylcholine (100)


Mask Ventilation:  positive


Positive End Tide CO2:  Yes


Breath Sounds after Intubation:  bilateral-equal


Intubated with ease:  Yes


Intubation Complications:  no complications, O2 saturation decreased


Post Intubation Xray-done:  Yes





Arterial Line


Arterial Line Catheter:  20G


Type:  Radial


Location:  Right


Procedure:  prepped, draped in sterile fashion, 1% lidocaine used to numb 

region, good wave-form was obtained, patient tolerated procedure well, no 

immediate complications, post procedure area cleaned, post procedure dressing 

applied











WENDY SULLIVAN CRNA              Jun 22, 2021 09:03

## 2021-06-22 NOTE — OCC THERAPY PROGRESS NOTE
Therapy Progress Note


OT orders received.  Pt. intubated and sedated.  Will continue to follow and 

assess pt. when medically stable.





0921











BISHNU CEE OT           Jun 22, 2021 09:21

## 2021-06-23 VITALS — DIASTOLIC BLOOD PRESSURE: 42 MMHG | SYSTOLIC BLOOD PRESSURE: 112 MMHG

## 2021-06-23 VITALS — SYSTOLIC BLOOD PRESSURE: 117 MMHG | DIASTOLIC BLOOD PRESSURE: 73 MMHG

## 2021-06-23 VITALS — DIASTOLIC BLOOD PRESSURE: 53 MMHG | SYSTOLIC BLOOD PRESSURE: 89 MMHG

## 2021-06-23 VITALS — SYSTOLIC BLOOD PRESSURE: 96 MMHG | DIASTOLIC BLOOD PRESSURE: 54 MMHG

## 2021-06-23 VITALS — DIASTOLIC BLOOD PRESSURE: 59 MMHG | SYSTOLIC BLOOD PRESSURE: 136 MMHG

## 2021-06-23 VITALS — DIASTOLIC BLOOD PRESSURE: 61 MMHG | SYSTOLIC BLOOD PRESSURE: 118 MMHG

## 2021-06-23 VITALS — DIASTOLIC BLOOD PRESSURE: 54 MMHG | SYSTOLIC BLOOD PRESSURE: 104 MMHG

## 2021-06-23 VITALS — SYSTOLIC BLOOD PRESSURE: 96 MMHG | DIASTOLIC BLOOD PRESSURE: 57 MMHG

## 2021-06-23 VITALS — SYSTOLIC BLOOD PRESSURE: 124 MMHG | DIASTOLIC BLOOD PRESSURE: 56 MMHG

## 2021-06-23 VITALS — DIASTOLIC BLOOD PRESSURE: 73 MMHG | SYSTOLIC BLOOD PRESSURE: 118 MMHG

## 2021-06-23 VITALS — DIASTOLIC BLOOD PRESSURE: 72 MMHG | SYSTOLIC BLOOD PRESSURE: 126 MMHG

## 2021-06-23 VITALS — SYSTOLIC BLOOD PRESSURE: 119 MMHG | DIASTOLIC BLOOD PRESSURE: 73 MMHG

## 2021-06-23 VITALS — SYSTOLIC BLOOD PRESSURE: 130 MMHG | DIASTOLIC BLOOD PRESSURE: 60 MMHG

## 2021-06-23 VITALS — SYSTOLIC BLOOD PRESSURE: 124 MMHG | DIASTOLIC BLOOD PRESSURE: 64 MMHG

## 2021-06-23 VITALS — SYSTOLIC BLOOD PRESSURE: 118 MMHG | DIASTOLIC BLOOD PRESSURE: 64 MMHG

## 2021-06-23 VITALS — SYSTOLIC BLOOD PRESSURE: 89 MMHG | DIASTOLIC BLOOD PRESSURE: 57 MMHG

## 2021-06-23 VITALS — SYSTOLIC BLOOD PRESSURE: 90 MMHG | DIASTOLIC BLOOD PRESSURE: 55 MMHG

## 2021-06-23 VITALS — SYSTOLIC BLOOD PRESSURE: 131 MMHG | DIASTOLIC BLOOD PRESSURE: 56 MMHG

## 2021-06-23 VITALS — DIASTOLIC BLOOD PRESSURE: 72 MMHG | SYSTOLIC BLOOD PRESSURE: 123 MMHG

## 2021-06-23 VITALS — DIASTOLIC BLOOD PRESSURE: 58 MMHG | SYSTOLIC BLOOD PRESSURE: 125 MMHG

## 2021-06-23 VITALS — SYSTOLIC BLOOD PRESSURE: 124 MMHG | DIASTOLIC BLOOD PRESSURE: 72 MMHG

## 2021-06-23 VITALS — DIASTOLIC BLOOD PRESSURE: 57 MMHG | SYSTOLIC BLOOD PRESSURE: 90 MMHG

## 2021-06-23 VITALS — SYSTOLIC BLOOD PRESSURE: 86 MMHG | DIASTOLIC BLOOD PRESSURE: 54 MMHG

## 2021-06-23 VITALS — DIASTOLIC BLOOD PRESSURE: 56 MMHG | SYSTOLIC BLOOD PRESSURE: 130 MMHG

## 2021-06-23 VITALS — SYSTOLIC BLOOD PRESSURE: 93 MMHG | DIASTOLIC BLOOD PRESSURE: 54 MMHG

## 2021-06-23 VITALS — SYSTOLIC BLOOD PRESSURE: 98 MMHG | DIASTOLIC BLOOD PRESSURE: 61 MMHG

## 2021-06-23 VITALS — SYSTOLIC BLOOD PRESSURE: 118 MMHG | DIASTOLIC BLOOD PRESSURE: 73 MMHG

## 2021-06-23 VITALS — SYSTOLIC BLOOD PRESSURE: 85 MMHG | DIASTOLIC BLOOD PRESSURE: 47 MMHG

## 2021-06-23 VITALS — DIASTOLIC BLOOD PRESSURE: 61 MMHG | SYSTOLIC BLOOD PRESSURE: 121 MMHG

## 2021-06-23 LAB
ALBUMIN SERPL-MCNC: 2.8 GM/DL (ref 3.2–4.5)
ALBUMIN SERPL-MCNC: 2.8 GM/DL (ref 3.2–4.5)
ALP SERPL-CCNC: 41 U/L (ref 40–136)
ALP SERPL-CCNC: 41 U/L (ref 40–136)
ALT SERPL-CCNC: 64 U/L (ref 0–55)
ALT SERPL-CCNC: 68 U/L (ref 0–55)
ARTERIAL PATENCY WRIST A: (no result)
ARTERIAL PATENCY WRIST A: (no result)
BASE EXCESS STD BLDA CALC-SCNC: -1.2 MMOL/L (ref -2.5–2.5)
BASE EXCESS STD BLDA CALC-SCNC: 0.6 MMOL/L (ref -2.5–2.5)
BASOPHILS # BLD AUTO: 0 10^3/UL (ref 0–0.1)
BASOPHILS # BLD AUTO: 0 10^3/UL (ref 0–0.1)
BASOPHILS NFR BLD AUTO: 0 % (ref 0–10)
BASOPHILS NFR BLD AUTO: 0 % (ref 0–10)
BDY SITE: (no result)
BDY SITE: (no result)
BILIRUB SERPL-MCNC: 1 MG/DL (ref 0.1–1)
BILIRUB SERPL-MCNC: 1 MG/DL (ref 0.1–1)
BODY TEMPERATURE: 36.7
BODY TEMPERATURE: 36.8
BUN/CREAT SERPL: 17
BUN/CREAT SERPL: 17
CALCIUM SERPL-MCNC: 8.4 MG/DL (ref 8.5–10.1)
CALCIUM SERPL-MCNC: 8.4 MG/DL (ref 8.5–10.1)
CHLORIDE SERPL-SCNC: 106 MMOL/L (ref 98–107)
CHLORIDE SERPL-SCNC: 108 MMOL/L (ref 98–107)
CO2 BLDA CALC-SCNC: 23.8 MMOL/L (ref 21–31)
CO2 BLDA CALC-SCNC: 24.5 MMOL/L (ref 21–31)
CO2 SERPL-SCNC: 21 MMOL/L (ref 21–32)
CO2 SERPL-SCNC: 21 MMOL/L (ref 21–32)
CREAT SERPL-MCNC: 1.67 MG/DL (ref 0.6–1.3)
CREAT SERPL-MCNC: 1.78 MG/DL (ref 0.6–1.3)
EOSINOPHIL # BLD AUTO: 0 10^3/UL (ref 0–0.3)
EOSINOPHIL # BLD AUTO: 0 10^3/UL (ref 0–0.3)
EOSINOPHIL NFR BLD AUTO: 0 % (ref 0–10)
EOSINOPHIL NFR BLD AUTO: 0 % (ref 0–10)
GFR SERPLBLD BASED ON 1.73 SQ M-ARVRAT: 42 ML/MIN
GFR SERPLBLD BASED ON 1.73 SQ M-ARVRAT: 46 ML/MIN
GLUCOSE SERPL-MCNC: 165 MG/DL (ref 70–105)
GLUCOSE SERPL-MCNC: 230 MG/DL (ref 70–105)
HCT VFR BLD CALC: 39 % (ref 40–54)
HCT VFR BLD CALC: 40 % (ref 40–54)
HGB BLD-MCNC: 13.8 G/DL (ref 13.3–17.7)
HGB BLD-MCNC: 13.9 G/DL (ref 13.3–17.7)
INHALED O2 FLOW RATE: (no result) L/MIN
INHALED O2 FLOW RATE: (no result) L/MIN
LYMPHOCYTES # BLD AUTO: 0.9 10^3/UL (ref 1–4)
LYMPHOCYTES # BLD AUTO: 1.3 10^3/UL (ref 1–4)
LYMPHOCYTES NFR BLD AUTO: 10 % (ref 12–44)
LYMPHOCYTES NFR BLD AUTO: 8 % (ref 12–44)
MAGNESIUM SERPL-MCNC: 2.6 MG/DL (ref 1.6–2.4)
MANUAL DIFFERENTIAL PERFORMED BLD QL: NO
MCH RBC QN AUTO: 28 PG (ref 25–34)
MCH RBC QN AUTO: 29 PG (ref 25–34)
MCHC RBC AUTO-ENTMCNC: 35 G/DL (ref 32–36)
MCHC RBC AUTO-ENTMCNC: 36 G/DL (ref 32–36)
MCV RBC AUTO: 80 FL (ref 80–99)
MCV RBC AUTO: 81 FL (ref 80–99)
MONOCYTES # BLD AUTO: 0.4 10^3/UL (ref 0–1)
MONOCYTES # BLD AUTO: 0.4 10^3/UL (ref 0–1)
MONOCYTES NFR BLD AUTO: 3 % (ref 0–12)
MONOCYTES NFR BLD AUTO: 4 % (ref 0–12)
MONOCYTES NFR BLD: 4 %
NEUTROPHILS # BLD AUTO: 10.2 10^3/UL (ref 1.8–7.8)
NEUTROPHILS # BLD AUTO: 10.5 10^3/UL (ref 1.8–7.8)
NEUTROPHILS NFR BLD AUTO: 85 % (ref 42–75)
NEUTROPHILS NFR BLD AUTO: 87 % (ref 42–75)
NEUTS BAND NFR BLD MANUAL: 85 %
PCO2 BLDA: 31 MMHG (ref 35–45)
PCO2 BLDA: 36 MMHG (ref 35–45)
PH BLDA: 7.42 [PH] (ref 7.37–7.43)
PH BLDA: 7.5 [PH] (ref 7.37–7.43)
PHOSPHATE SERPL-MCNC: 4.4 MG/DL (ref 2.3–4.7)
PLATELET # BLD: 347 10^3/UL (ref 130–400)
PLATELET # BLD: 381 10^3/UL (ref 130–400)
PMV BLD AUTO: 10.2 FL (ref 9–12.2)
PMV BLD AUTO: 10.2 FL (ref 9–12.2)
PO2 BLDA: 65 MMHG (ref 79–93)
PO2 BLDA: 84 MMHG (ref 79–93)
POTASSIUM SERPL-SCNC: 3.7 MMOL/L (ref 3.6–5)
POTASSIUM SERPL-SCNC: 3.7 MMOL/L (ref 3.6–5)
PROT SERPL-MCNC: 6 GM/DL (ref 6.4–8.2)
PROT SERPL-MCNC: 6.1 GM/DL (ref 6.4–8.2)
RBC MORPH BLD: NORMAL
SAO2 % BLDA FROM PO2: 93 % (ref 94–100)
SAO2 % BLDA FROM PO2: 98 % (ref 94–100)
SODIUM SERPL-SCNC: 141 MMOL/L (ref 135–145)
SODIUM SERPL-SCNC: 143 MMOL/L (ref 135–145)
TRIGL SERPL-MCNC: 160 MG/DL (ref ?–150)
VARIANT LYMPHS NFR BLD MANUAL: 11 %
VENTILATION MODE VENT: YES
VENTILATION MODE VENT: YES
WBC # BLD AUTO: 11.7 10^3/UL (ref 4.3–11)
WBC # BLD AUTO: 12.5 10^3/UL (ref 4.3–11)

## 2021-06-23 RX ADMIN — ALBUTEROL SULFATE SCH GM: 90 AEROSOL, METERED RESPIRATORY (INHALATION) at 06:35

## 2021-06-23 RX ADMIN — Medication SCH MLS/HR: at 01:33

## 2021-06-23 RX ADMIN — ALBUTEROL SULFATE SCH GM: 90 AEROSOL, METERED RESPIRATORY (INHALATION) at 22:40

## 2021-06-23 RX ADMIN — PROPOFOL SCH MLS/HR: 10 INJECTION, EMULSION INTRAVENOUS at 11:56

## 2021-06-23 RX ADMIN — ENOXAPARIN SODIUM SCH MG: 100 INJECTION SUBCUTANEOUS at 19:35

## 2021-06-23 RX ADMIN — INSULIN ASPART SCH UNIT: 100 INJECTION, SOLUTION INTRAVENOUS; SUBCUTANEOUS at 22:46

## 2021-06-23 RX ADMIN — PANTOPRAZOLE SODIUM SCH MG: 40 INJECTION, POWDER, FOR SOLUTION INTRAVENOUS at 08:40

## 2021-06-23 RX ADMIN — PROPOFOL SCH MLS/HR: 10 INJECTION, EMULSION INTRAVENOUS at 04:51

## 2021-06-23 RX ADMIN — PROPOFOL SCH MLS/HR: 10 INJECTION, EMULSION INTRAVENOUS at 17:41

## 2021-06-23 RX ADMIN — INSULIN ASPART SCH UNIT: 100 INJECTION, SOLUTION INTRAVENOUS; SUBCUTANEOUS at 17:22

## 2021-06-23 RX ADMIN — DEXTROSE MONOHYDRATE SCH MLS/HR: 5 INJECTION, SOLUTION INTRAVENOUS at 16:15

## 2021-06-23 RX ADMIN — FLUTICASONE PROPIONATE AND SALMETEROL SCH EACH: 232; 14 POWDER, METERED RESPIRATORY (INHALATION) at 06:35

## 2021-06-23 RX ADMIN — ACETAMINOPHEN PRN MG: 325 TABLET ORAL at 18:02

## 2021-06-23 RX ADMIN — POTASSIUM CHLORIDE SCH MLS/HR: 200 INJECTION, SOLUTION INTRAVENOUS at 02:10

## 2021-06-23 RX ADMIN — DOCUSATE SODIUM AND SENNOSIDES SCH EA: 8.6; 5 TABLET, FILM COATED ORAL at 19:35

## 2021-06-23 RX ADMIN — SODIUM CHLORIDE SCH MLS/HR: 900 INJECTION, SOLUTION INTRAVENOUS at 12:14

## 2021-06-23 RX ADMIN — FLUTICASONE PROPIONATE AND SALMETEROL SCH EACH: 232; 14 POWDER, METERED RESPIRATORY (INHALATION) at 19:40

## 2021-06-23 RX ADMIN — POTASSIUM CHLORIDE SCH MEQ: 1500 TABLET, EXTENDED RELEASE ORAL at 02:11

## 2021-06-23 RX ADMIN — SODIUM CHLORIDE SCH MLS/HR: 900 INJECTION, SOLUTION INTRAVENOUS at 20:57

## 2021-06-23 RX ADMIN — INSULIN ASPART SCH UNIT: 100 INJECTION, SOLUTION INTRAVENOUS; SUBCUTANEOUS at 12:14

## 2021-06-23 RX ADMIN — MAGNESIUM SULFATE IN DEXTROSE SCH MLS/HR: 10 INJECTION, SOLUTION INTRAVENOUS at 02:10

## 2021-06-23 RX ADMIN — ENOXAPARIN SODIUM SCH MG: 100 INJECTION SUBCUTANEOUS at 08:40

## 2021-06-23 RX ADMIN — Medication SCH MLS/HR: at 04:39

## 2021-06-23 RX ADMIN — ALBUTEROL SULFATE SCH GM: 90 AEROSOL, METERED RESPIRATORY (INHALATION) at 02:24

## 2021-06-23 RX ADMIN — SIMVASTATIN SCH MG: 10 TABLET, FILM COATED ORAL at 19:35

## 2021-06-23 RX ADMIN — Medication SCH MLS/HR: at 12:50

## 2021-06-23 RX ADMIN — Medication SCH MLS/HR: at 19:34

## 2021-06-23 RX ADMIN — Medication SCH MLS/HR: at 16:45

## 2021-06-23 RX ADMIN — INSULIN ASPART SCH UNIT: 100 INJECTION, SOLUTION INTRAVENOUS; SUBCUTANEOUS at 04:57

## 2021-06-23 RX ADMIN — ALBUTEROL SULFATE SCH GM: 90 AEROSOL, METERED RESPIRATORY (INHALATION) at 19:39

## 2021-06-23 RX ADMIN — PROPOFOL SCH MLS/HR: 10 INJECTION, EMULSION INTRAVENOUS at 04:52

## 2021-06-23 RX ADMIN — ALBUTEROL SULFATE SCH GM: 90 AEROSOL, METERED RESPIRATORY (INHALATION) at 11:10

## 2021-06-23 RX ADMIN — ACETAMINOPHEN PRN MG: 325 TABLET ORAL at 21:16

## 2021-06-23 RX ADMIN — DOCUSATE SODIUM AND SENNOSIDES SCH EA: 8.6; 5 TABLET, FILM COATED ORAL at 08:40

## 2021-06-23 RX ADMIN — DEXTROSE MONOHYDRATE SCH MLS/HR: 5 INJECTION, SOLUTION INTRAVENOUS at 22:02

## 2021-06-23 RX ADMIN — PROPOFOL SCH MLS/HR: 10 INJECTION, EMULSION INTRAVENOUS at 11:57

## 2021-06-23 RX ADMIN — ALBUTEROL SULFATE SCH GM: 90 AEROSOL, METERED RESPIRATORY (INHALATION) at 14:31

## 2021-06-23 NOTE — PROGRESS NOTE
Subjective


Subjective/Events-last exam


Afebrile, but persistent poor respiratory status, worsening bradycardia and with

new hypotension and acute kidney injury overnight.





Focused Exam


Lactate Level


6/23/21 04:44: Lactic Acid Level 3.12*H


6/23/21 06:08: Lactic Acid Level 1.59





Objective


Exam


Last Set of Vital Signs





Vital Signs








  Date Time  Temp Pulse Resp B/P (MAP) Pulse Ox O2 Delivery O2 Flow Rate FiO2


 


6/23/21 08:16 36.4       


 


6/23/21 08:00      Mechanical Ventilator  100


 


6/23/21 08:00  48 26 136/59 (84) 97  100.00 





Capillary Refill : Less Than 3 Seconds


I&O











Intake and Output 


 


 6/22/21





 23:59


 


Intake Total 600 ml


 


Output Total 880 ml


 


Balance -280 ml


 


 


 


Intake Oral 150 ml


 


IV Total 450 ml


 


Output Urine Total 880 ml








General:  Other (sedated, intubated)


Lungs:  Other (ronchi)


Heart:  No Murmurs, Other (bradycardic)


Abdomen:  Normal Bowel Sounds, Soft


Extremities:  No Edema, Normal Pulses





Results/Procedures


Lab


Laboratory Tests


6/22/21 11:08: Glucometer 237H


6/22/21 16:00: 


Urine Color YELLOW, Urine Clarity CLEAR, Urine pH 6.0, Urine Specific Gravity 

1.020, Urine Protein NEGATIVE, Urine Glucose (UA) 2+H, Urine Ketones NEGATIVE, 

Urine Nitrite NEGATIVE, Urine Bilirubin NEGATIVE, Urine Urobilinogen 1.0, Urine 

Leukocyte Esterase NEGATIVE, Urine RBC (Auto) NEGATIVE, Urine RBC RARE, Urine 

WBC 0-2, Urine Crystals PRESENTH, Urine Uric Acid Crystals MODERATEH, Urine 

Bacteria TRACE, Urine Casts PRESENT, Urine Hyaline Casts 0-2H, Urine Mucus 

SMALLH, Urine Culture Indicated NO


6/22/21 17:00: Glucometer 296H


6/22/21 19:55: Glucometer 286H


6/22/21 22:47: Glucometer 265H


6/23/21 01:29: 


White Blood Count 11.7H, Red Blood Count 4.81, Hemoglobin 13.8, Hematocrit 39L, 

Mean Corpuscular Volume 80, Mean Corpuscular Hemoglobin 29, Mean Corpuscular 

Hemoglobin Concent 36, Red Cell Distribution Width 12.4, Platelet Count 347, 

Mean Platelet Volume 10.2, Immature Granulocyte % (Auto) 1, Neutrophils (%) 

(Auto) 87H, Lymphocytes (%) (Auto) 8L, Monocytes (%) (Auto) 4, Eosinophils (%) 

(Auto) 0, Basophils (%) (Auto) 0, Neutrophils # (Auto) 10.2H, Lymphocytes # 

(Auto) 0.9L, Monocytes # (Auto) 0.4, Eosinophils # (Auto) 0.0, Basophils # 

(Auto) 0.0, Immature Granulocyte # (Auto) 0.2H, Blood Gas Puncture Site RT 

ARTLINE, Blood Gas Patient Temperature 36.7, Arterial Blood pH 7.50H, Arterial 

Blood Partial Pressure CO2 31L, Arterial Blood Partial Pressure O2 84, Arterial 

Blood HCO3 24, Arterial Blood Total CO2 24.5, Arterial Blood Oxygen Saturation 

98, Arterial Blood Base Excess 0.6, Gunnar Test ART LINE, Blood Gas Ventilator 

Setting YES, Blood Gas Inspired Oxygen 75%, Sodium Level 141, Potassium Level 

3.7, Chloride Level 106, Carbon Dioxide Level 21, Anion Gap 14, Blood Urea 

Nitrogen 28H, Creatinine 1.67H, Estimat Glomerular Filtration Rate 46, 

BUN/Creatinine Ratio 17, Glucose Level 230H, Calcium Level 8.4L, Corrected 

Calcium 9.4, Phosphorus Level 4.4, Magnesium Level 2.6H, Total Bilirubin 1.0, 

Aspartate Amino Transf (AST/SGOT) 33, Alanine Aminotransferase (ALT/SGPT) 68H, 

Alkaline Phosphatase 41, Total Protein 6.0L, Albumin 2.8L, Triglycerides Level 

160H


6/23/21 04:37: 


Blood Gas Puncture Site ART LINE, Blood Gas Patient Temperature 36.8, Arterial 

Blood pH 7.42, Arterial Blood Partial Pressure CO2 36, Arterial Blood Partial 

Pressure O2 65L, Arterial Blood HCO3 23, Arterial Blood Total CO2 23.8, Arterial

Blood Oxygen Saturation 93L, Arterial Blood Base Excess -1.2, Gunnar Test ART 

LINE, Blood Gas Ventilator Setting YES, Blood Gas Inspired Oxygen 70%


6/23/21 04:44: 


White Blood Count 12.5H, Red Blood Count 4.95, Hemoglobin 13.9, Hematocrit 40, 

Mean Corpuscular Volume 81, Mean Corpuscular Hemoglobin 28, Mean Corpuscular 

Hemoglobin Concent 35, Red Cell Distribution Width 12.4, Platelet Count 381, 

Mean Platelet Volume 10.2, Immature Granulocyte % (Auto) 2, Neutrophils (%) 

(Auto) 85H, Lymphocytes (%) (Auto) 10L, Monocytes (%) (Auto) 3, Eosinophils (%) 

(Auto) 0, Basophils (%) (Auto) 0, Neutrophils # (Auto) 10.5H, Lymphocytes # 

(Auto) 1.3, Monocytes # (Auto) 0.4, Eosinophils # (Auto) 0.0, Basophils # (Auto)

0.0, Immature Granulocyte # (Auto) 0.2H, Sodium Level 143, Potassium Level 3.7, 

Chloride Level 108H, Carbon Dioxide Level 21, Anion Gap 14, Blood Urea Nitrogen 

30H, Creatinine 1.78H, Estimat Glomerular Filtration Rate 42, BUN/Creatinine 

Ratio 17, Glucose Level 165H, Calcium Level 8.4L, Corrected Calcium 9.4, Total 

Bilirubin 1.0, Aspartate Amino Transf (AST/SGOT) 30, Alanine Aminotransferase 

(ALT/SGPT) 64H, Alkaline Phosphatase 41, Total Protein 6.1L, Albumin 2.8L, N

eutrophils % (Manual) 85, Lymphocytes % (Manual) 11, Monocytes % (Manual) 4, 

Blood Morphology Comment NORMAL, Lactic Acid Level 3.12*H


6/23/21 04:57: Glucometer 154H


6/23/21 06:08: Lactic Acid Level 1.59





Microbiology


6/22/21 Gram Stain - Final, Resulted


          


6/22/21 Sputum Culture, Resulted


          Pending


6/19/21 Blood Culture - Preliminary, Resulted


          No growth





Assessment/Plan


Assessment/Plan





(1) COVID-19


Status:  Acute


Assessment & Plan:  s/p convalescent plasma, receiving dexamethasone. D/C 

remdesevir due to significant hypoxia/need for bipap. Appreciate Tele ICU 

recommendations. D dimer minimally elevated.





(2) Acute respiratory failure


Status:  Acute


Assessment & Plan:  Secondary to COVID19, requiring bipap, appreciate tele ICU 

assistance. 


6/22 continued worsening, required intubation and mechanical ventilation 

starting this am, appreciate [facility] recommendations. 


6/23 difficulty managing hypoxia, remains at 100% FiO2, PEEP increased to 14 per

[facility] ICU orders, proning improves status but has difficulty tolerating the 

turn, had hypotension with turning last night.


Qualifiers:  


   Qualified Codes:  J96.01 - Acute respiratory failure with hypoxia


(3) Diabetes mellitus, type 2


Status:  Chronic


Assessment & Plan:  On levemir 25 units BID inpatient, holding home glipizide, 

pioglitazone and canagliflozin. Sliding scale insulin.


6/22 glucose all above 200, will increase levemir to 28 units BID.


Qualifiers:  


   Qualified Codes:  E11.65 - Type 2 diabetes mellitus with hyperglycemia


(4) Hypertension


Status:  Chronic


Assessment & Plan:  Hold home lisinopril/HCTZ with low normal BP


6/22 BP increased but not significantly hypertensive, monitor and resume 

antihypertensive as needed


Qualifiers:  


   Qualified Codes:  I10 - Essential (primary) hypertension


(5) Obesity (BMI 30-39.9)


Status:  Chronic


(6) Hypotension


Status:  Acute


Assessment & Plan:  Required norepinephrine overnight, resume as needed. 

Appreciate EICU recommendations.





(7) Bradycardia


Status:  Acute


Assessment & Plan:  Precedex held, suspect partially related to COVID, remaining

in 50s after holding Precedex.





(8) Acute renal insufficiency


Status:  Acute


Assessment & Plan:  Started on IVF per EICU.





(9) On mechanically assisted ventilation


Status:  Acute


(10) At risk for stress ulcer


Status:  Acute


Assessment & Plan:  Pantoprazole





(11) Impaired nutrition


Status:  Acute


Assessment & Plan:  Starting tube feeds.





(12) DVT prophylaxis


Status:  Acute


Assessment & Plan:  Enoxaparin














CATRINA CHAVEZ MD             Jun 23, 2021 10:41

## 2021-06-23 NOTE — DIAGNOSTIC IMAGING REPORT
EXAMINATION: Portable erect AP chest at 4:29 AM



INDICATION: Respiratory distress, Covid



There is a better inspiratory effort on this study than on the

prior exam of 06/22/2021. Allowing for this technical factor, the

heart is stable in size. There are still alveolar/interstitial

pulmonary infiltrates involving both lung bases and to a lesser

extent the upper lobes. However, the right lung base does seem

somewhat better aerated than on the prior exam. There still

appears to be a small amount of fluid in each lung base. The

mediastinum is not widened. The osseous structures are intact.

The ET tube and NG line seen previously are again evident and no

different.



IMPRESSION: The appearance of the chest has improved somewhat

since the prior exam as the right lung base does seem slightly

better aerated. There is still persistent areas of

pneumonia/atelectasis and fluid involving each lung, however. A

follow-up exam would be recommended for continued study.



Dictated by: 



  Dictated on workstation # [id number]

## 2021-06-23 NOTE — TELE-ICU PROGRESS NOTE
Subjective


Date Seen by a Provider:  2021


Time Seen by a Provider:  10:22


Subjective/Events-last exam


Intubated last night due to hypoxia, now on AC 26, Vt 520, PEEP 14 FiO2 100%, 

Now on IV propofol 60, on IV Fentanyl 125, vent alarming at times before 

sedation


Off Precedex due to bracycardia, off IV levo, BP ok MAP 70's


Laboratory Tests








Test


 21


04:38 21


10:05 21


01:29 21


04:37


 


Blood Gas Puncture Site


 L RAD 


 RT RADIAL ART


LINE RT ARTLINE 


 ART LINE 





 


Blood Gas Patient Temperature 99.9  37.0  36.7  36.8 


 


Arterial Blood pH 7.47  7.42  7.50  7.42 


 


Arterial Blood Partial


Pressure CO2 36 MMHG 


 38 MMHG 


 31 MMHG 


 36 MMHG 





 


Arterial Blood Partial


Pressure O2 60 MMHG 


 53 MMHG 


 84 MMHG 


 65 MMHG 





 


Arterial Blood HCO3 26 MMOL/L  24 MMOL/L  24 MMOL/L  23 MMOL/L 


 


Arterial Blood Total CO2 26.8 MMOL/L  24.9 MMOL/L  24.5 MMOL/L  23.8 MMOL/L 


 


Arterial Blood Oxygen


Saturation 92 % 


 88 % 


 98 % 


 93 % 





 


Arterial Blood Base Excess 2.5 MMOL/L  -0.2 MMOL/L  0.6 MMOL/L  -1.2 MMOL/L 


 


Gunnar Test YES-POS  YES-POS  ART LINE  ART LINE 


 


Blood Gas Ventilator Setting NO  YES  YES  YES 


 


Blood Gas Inspired Oxygen 100% BIPAP  100%  75%  70% 











Sepsis Event


Evaluation


Height, Weight, BMI


Height: 5'11"


Weight: 305lbs. oz. 138.826892om; 39.92 BMI


Method:Stated





Focused Exam


Lactate Level


21 04:44: Lactic Acid Level 3.12*H


21 06:08: Lactic Acid Level 1.59


Lactic Acid Level





Laboratory Tests








Test


 21


04:44 21


06:08


 


Lactic Acid Level


 3.12 MMOL/L


(0.50-2.00)  *H 1.59 MMOL/L


(0.50-2.00)











Exam


Exam


Patient acknowledged, consented, and participated in this virtual visit which 

was conducted using real time audio/video


Vital Signs








  Date Time  Temp Pulse Resp B/P (MAP) Pulse Ox O2 Delivery O2 Flow Rate FiO2


 


21 06:37  50 26  96   100


 


21 06:04    92/54    


 


21 06:00  54 26 93/54 (67) 93 Mechanical Ventilator 100.00 


 


21 05:19    92/46    


 


21 05:14      Mechanical Ventilator 100.00 


 


21 05:11      Mechanical Ventilator 90.00 


 


21 05:02    141/60    


 


21 05:01      Mechanical Ventilator 80.00 


 


21 05:00  66 28 96/54 (68) 90 Mechanical Ventilator 70.00 


 


21 05:00    115/62    


 


21 04:52    128/59    


 


21 04:51  64 36  94   70


 


21 04:51    128/59    


 


21 04:45    149/75    


 


21 04:39    91/50    


 


21 04:00  57 26 118/73 (88) 95 Mechanical Ventilator 70.00 


 


21 03:04      Mechanical Ventilator  85


 


21 03:00  55 26 119/73 (88) 95 Mechanical Ventilator 70.00 


 


21 02:45 36.7       


 


21 02:24  54 26  95   75


 


21 02:10      Mechanical Ventilator 70.00 


 


21 02:00  53 26 117/73 (88) 96 Mechanical Ventilator 75.00 


 


21 01:00  52      


 


21 01:00  52 26 118/73 (88) 96 Mechanical Ventilator 75.00 


 


21 00:00  51 26 124/72 (89) 96 Mechanical Ventilator 75.00 


 


21 23:04      Mechanical Ventilator  85


 


21 23:00  51 26 137/72 (93) 96 Mechanical Ventilator 75.00 


 


21 22:53      Mechanical Ventilator 75.00 


 


21 22:51    139/71    


 


21 22:51    139/71    


 


21 22:48 36.6       


 


21 22:00  50 26 142/72 (95) 96 Mechanical Ventilator 80.00 


 


21 21:59      Mechanical Ventilator 80.00 


 


21 21:57  49 26  96   85


 


21 21:00  51 26 144/73 (96) 97 Mechanical Ventilator 85.00 


 


21 20:06      Mechanical Ventilator  85


 


21 20:00  50 33 144/73 (96) 95 Mechanical Ventilator 85.00 


 


21 19:57 36.6     Mechanical Ventilator 85.00 


 


21 19:56  50      


 


21 19:05  49 26 148/74 (98) 96 Mechanical Ventilator 90.00 


 


21 19:00  50      


 


21 19:00      Mechanical Ventilator 90.00 


 


21 18:33  50 26  97   100


 


21 18:00  48 26 149/76 (100) 98 Mechanical Ventilator 100.00 


 


21 17:00  49 9 150/76 (100) 97 Mechanical Ventilator 100.00 


 


21 16:51  54  149/75    


 


21 16:20      Mechanical Ventilator  100


 


21 16:00  54 26 146/73 (97) 90 Mechanical Ventilator 100.00 


 


21 15:00  59 33 130/56 (80) 91 Mechanical Ventilator 100.00 


 


21 14:58  58 26  90   100


 


21 14:00  48 26 139/75 (96) 95 Mechanical Ventilator 100.00 


 


21 13:00  47 26 147/74 (98) 95 Mechanical Ventilator 100.00 


 


21 12:23  48      


 


21 12:15      Mechanical Ventilator  100


 


21 12:00  49 26 151/76 (101) 95 Mechanical Ventilator 100.00 


 


21 11:00  59 26 140/66 (90) 93 Mechanical Ventilator 100.00 


 


21 10:49  60 26  94   100


 


21 10:04     85   


 


21 10:04  78  134/73    


 


21 10:04  65  134/74    


 


21 10:00  67 26 125/78 (94) 84 Mechanical Ventilator 100.00 


 


21 09:18  78 26  79   100


 


21 09:00  68   88 Mechanical Ventilator 100.00 


 


21 08:43  78 19  79   100


 


21 08:11      NIV Bilevel  100














I & O 


 


 21





 06:59


 


Intake Total 1150 ml


 


Output Total 1230 ml


 


Balance -80 ml








Height & Weight


Height: 5'11"


Weight: 305lbs. oz. 138.394539pj; 39.92 BMI


Method:Stated


General Appearance:  No Apparent Distress, Anxious, Chronically ill, Obese, 

Other (sedated)


HEENT:  PERRL/EOMI, Normal ENT Inspection, Pharynx Normal, Moist Mucous 

Membranes


Neck:  Full Range of Motion, Normal Inspection, Non Tender


Respiratory:  Chest Non Tender, Lungs Clear, No Accessory Muscle Use, No 

Respiratory Distress, Decreased Breath Sounds


Cardiovascular:  Regular Rate, Rhythm, No Edema, No Gallop, No JVD, No Murmur, 

Normal Peripheral Pulses, Bradycardia


Capillary Refill:  Less Than 3 Seconds


Gastrointestinal:  normal bowel sounds, non tender, soft


Extremity:  Normal Capillary Refill, Normal Inspection, Normal Range of Motion, 

Non Tender, No Calf Tenderness, No Pedal Edema


Neurologic/Psychiatric:  Alert, Oriented x3, No Motor/Sensory Deficits, Normal 

Mood/Affect


Skin:  Normal Color, Warm/Dry


Lymphatic:  No Adenopathy





Results


Lab


Laboratory Tests


21 09:15








21 03:35








21 01:29








21 04:44











Assessment/Plan


Assessment/Plan


COVID PNA worsening, pt intubated


will leave on full vent support until oxygenation and CXR improve





O/22


Nutrition: not yet


Analgesia: fentanl prn


Anxiety/ delirium = propofol , intubated  = AAO


VTE Prophylaxis: Lov 40 q12


Stress Ulcer Prophylaxis: PPI


Glycemic Control: +





Will need TF will order-ordered [facility] per dietician's note





Cr up at 1.78, will keep hydrated, not on maintenance fluid, will start normal 

saline @ 83 mL/H


Critical Care:  Critically Ill Patient


Time spent with patient (mins):  20











LAVINIA GALINDO MD             2021 08:14

## 2021-06-23 NOTE — OCC THERAPY PROGRESS NOTE
Therapy Progress Note


OT orders received.  Pt. intubated and sedated.  Will continue to follow and 

assess pt. when medically stable.











MAURA YIN               Jun 23, 2021 14:42

## 2021-06-23 NOTE — PHYSICAL THERAPY PROGRESS NOTE
Therapy Progress Note


Patient remains intubated and sedated.  Will continue to monitor.











MIGUEL ORLANDO PT                Jun 23, 2021 08:00

## 2021-06-24 VITALS — DIASTOLIC BLOOD PRESSURE: 60 MMHG | SYSTOLIC BLOOD PRESSURE: 121 MMHG

## 2021-06-24 VITALS — SYSTOLIC BLOOD PRESSURE: 107 MMHG | DIASTOLIC BLOOD PRESSURE: 57 MMHG

## 2021-06-24 VITALS — SYSTOLIC BLOOD PRESSURE: 131 MMHG | DIASTOLIC BLOOD PRESSURE: 57 MMHG

## 2021-06-24 VITALS — SYSTOLIC BLOOD PRESSURE: 98 MMHG | DIASTOLIC BLOOD PRESSURE: 63 MMHG

## 2021-06-24 VITALS — SYSTOLIC BLOOD PRESSURE: 106 MMHG | DIASTOLIC BLOOD PRESSURE: 57 MMHG

## 2021-06-24 VITALS — DIASTOLIC BLOOD PRESSURE: 60 MMHG | SYSTOLIC BLOOD PRESSURE: 103 MMHG

## 2021-06-24 VITALS — DIASTOLIC BLOOD PRESSURE: 60 MMHG | SYSTOLIC BLOOD PRESSURE: 109 MMHG

## 2021-06-24 VITALS — SYSTOLIC BLOOD PRESSURE: 123 MMHG | DIASTOLIC BLOOD PRESSURE: 68 MMHG

## 2021-06-24 VITALS — SYSTOLIC BLOOD PRESSURE: 94 MMHG | DIASTOLIC BLOOD PRESSURE: 60 MMHG

## 2021-06-24 VITALS — SYSTOLIC BLOOD PRESSURE: 101 MMHG | DIASTOLIC BLOOD PRESSURE: 60 MMHG

## 2021-06-24 VITALS — SYSTOLIC BLOOD PRESSURE: 112 MMHG | DIASTOLIC BLOOD PRESSURE: 63 MMHG

## 2021-06-24 VITALS — DIASTOLIC BLOOD PRESSURE: 61 MMHG | SYSTOLIC BLOOD PRESSURE: 113 MMHG

## 2021-06-24 VITALS — DIASTOLIC BLOOD PRESSURE: 70 MMHG | SYSTOLIC BLOOD PRESSURE: 121 MMHG

## 2021-06-24 VITALS — SYSTOLIC BLOOD PRESSURE: 121 MMHG | DIASTOLIC BLOOD PRESSURE: 65 MMHG

## 2021-06-24 VITALS — SYSTOLIC BLOOD PRESSURE: 117 MMHG | DIASTOLIC BLOOD PRESSURE: 73 MMHG

## 2021-06-24 VITALS — DIASTOLIC BLOOD PRESSURE: 56 MMHG | SYSTOLIC BLOOD PRESSURE: 95 MMHG

## 2021-06-24 VITALS — DIASTOLIC BLOOD PRESSURE: 60 MMHG | SYSTOLIC BLOOD PRESSURE: 115 MMHG

## 2021-06-24 VITALS — SYSTOLIC BLOOD PRESSURE: 113 MMHG | DIASTOLIC BLOOD PRESSURE: 55 MMHG

## 2021-06-24 VITALS — SYSTOLIC BLOOD PRESSURE: 114 MMHG | DIASTOLIC BLOOD PRESSURE: 55 MMHG

## 2021-06-24 VITALS — SYSTOLIC BLOOD PRESSURE: 111 MMHG | DIASTOLIC BLOOD PRESSURE: 59 MMHG

## 2021-06-24 VITALS — DIASTOLIC BLOOD PRESSURE: 53 MMHG | SYSTOLIC BLOOD PRESSURE: 98 MMHG

## 2021-06-24 VITALS — SYSTOLIC BLOOD PRESSURE: 127 MMHG | DIASTOLIC BLOOD PRESSURE: 59 MMHG

## 2021-06-24 VITALS — DIASTOLIC BLOOD PRESSURE: 58 MMHG | SYSTOLIC BLOOD PRESSURE: 108 MMHG

## 2021-06-24 VITALS — DIASTOLIC BLOOD PRESSURE: 66 MMHG | SYSTOLIC BLOOD PRESSURE: 132 MMHG

## 2021-06-24 VITALS — SYSTOLIC BLOOD PRESSURE: 107 MMHG | DIASTOLIC BLOOD PRESSURE: 59 MMHG

## 2021-06-24 VITALS — DIASTOLIC BLOOD PRESSURE: 58 MMHG | SYSTOLIC BLOOD PRESSURE: 116 MMHG

## 2021-06-24 VITALS — DIASTOLIC BLOOD PRESSURE: 47 MMHG | SYSTOLIC BLOOD PRESSURE: 95 MMHG

## 2021-06-24 VITALS — DIASTOLIC BLOOD PRESSURE: 64 MMHG | SYSTOLIC BLOOD PRESSURE: 112 MMHG

## 2021-06-24 VITALS — DIASTOLIC BLOOD PRESSURE: 69 MMHG | SYSTOLIC BLOOD PRESSURE: 126 MMHG

## 2021-06-24 VITALS — DIASTOLIC BLOOD PRESSURE: 59 MMHG | SYSTOLIC BLOOD PRESSURE: 105 MMHG

## 2021-06-24 LAB
ARTERIAL PATENCY WRIST A: (no result)
BASE EXCESS STD BLDA CALC-SCNC: -1.3 MMOL/L (ref -2.5–2.5)
BASOPHILS # BLD AUTO: 0 10^3/UL (ref 0–0.1)
BASOPHILS NFR BLD AUTO: 0 % (ref 0–10)
BDY SITE: (no result)
BODY TEMPERATURE: 37.8
BUN/CREAT SERPL: 23
CALCIUM SERPL-MCNC: 7.8 MG/DL (ref 8.5–10.1)
CHLORIDE SERPL-SCNC: 110 MMOL/L (ref 98–107)
CO2 BLDA CALC-SCNC: 23.5 MMOL/L (ref 21–31)
CO2 SERPL-SCNC: 20 MMOL/L (ref 21–32)
CREAT SERPL-MCNC: 1.33 MG/DL (ref 0.6–1.3)
EOSINOPHIL # BLD AUTO: 0.1 10^3/UL (ref 0–0.3)
EOSINOPHIL NFR BLD AUTO: 0 % (ref 0–10)
GFR SERPLBLD BASED ON 1.73 SQ M-ARVRAT: 59 ML/MIN
GLUCOSE SERPL-MCNC: 212 MG/DL (ref 70–105)
HCT VFR BLD CALC: 38 % (ref 40–54)
HGB BLD-MCNC: 12.9 G/DL (ref 13.3–17.7)
INHALED O2 FLOW RATE: (no result) L/MIN
LYMPHOCYTES # BLD AUTO: 1 10^3/UL (ref 1–4)
LYMPHOCYTES NFR BLD AUTO: 7 % (ref 12–44)
MAGNESIUM SERPL-MCNC: 2.4 MG/DL (ref 1.6–2.4)
MANUAL DIFFERENTIAL PERFORMED BLD QL: NO
MCH RBC QN AUTO: 28 PG (ref 25–34)
MCHC RBC AUTO-ENTMCNC: 34 G/DL (ref 32–36)
MCV RBC AUTO: 83 FL (ref 80–99)
MONOCYTES # BLD AUTO: 0.6 10^3/UL (ref 0–1)
MONOCYTES NFR BLD AUTO: 4 % (ref 0–12)
NEUTROPHILS # BLD AUTO: 12.2 10^3/UL (ref 1.8–7.8)
NEUTROPHILS NFR BLD AUTO: 86 % (ref 42–75)
PCO2 BLDA: 36 MMHG (ref 35–45)
PH BLDA: 7.42 [PH] (ref 7.37–7.43)
PHOSPHATE SERPL-MCNC: 3 MG/DL (ref 2.3–4.7)
PLATELET # BLD: 446 10^3/UL (ref 130–400)
PMV BLD AUTO: 10.2 FL (ref 9–12.2)
PO2 BLDA: 72 MMHG (ref 79–93)
POTASSIUM SERPL-SCNC: 3.4 MMOL/L (ref 3.6–5)
SAO2 % BLDA FROM PO2: 94 % (ref 94–100)
SODIUM SERPL-SCNC: 143 MMOL/L (ref 135–145)
VENTILATION MODE VENT: YES
WBC # BLD AUTO: 14.1 10^3/UL (ref 4.3–11)

## 2021-06-24 RX ADMIN — Medication SCH MLS/HR: at 17:25

## 2021-06-24 RX ADMIN — SODIUM CHLORIDE SCH MLS/HR: 900 INJECTION, SOLUTION INTRAVENOUS at 00:00

## 2021-06-24 RX ADMIN — MAGNESIUM SULFATE IN DEXTROSE SCH MLS/HR: 10 INJECTION, SOLUTION INTRAVENOUS at 02:15

## 2021-06-24 RX ADMIN — POTASSIUM CHLORIDE SCH MLS/HR: 200 INJECTION, SOLUTION INTRAVENOUS at 02:23

## 2021-06-24 RX ADMIN — PROPOFOL SCH MLS/HR: 10 INJECTION, EMULSION INTRAVENOUS at 05:27

## 2021-06-24 RX ADMIN — DEXTROSE MONOHYDRATE SCH MLS/HR: 5 INJECTION, SOLUTION INTRAVENOUS at 17:15

## 2021-06-24 RX ADMIN — Medication SCH MLS/HR: at 05:27

## 2021-06-24 RX ADMIN — INSULIN ASPART SCH UNIT: 100 INJECTION, SOLUTION INTRAVENOUS; SUBCUTANEOUS at 23:44

## 2021-06-24 RX ADMIN — SODIUM CHLORIDE SCH MLS/HR: 900 INJECTION, SOLUTION INTRAVENOUS at 13:45

## 2021-06-24 RX ADMIN — PROPOFOL SCH MLS/HR: 10 INJECTION, EMULSION INTRAVENOUS at 00:00

## 2021-06-24 RX ADMIN — ACETAMINOPHEN PRN MG: 325 TABLET ORAL at 09:03

## 2021-06-24 RX ADMIN — PROPOFOL SCH MLS/HR: 10 INJECTION, EMULSION INTRAVENOUS at 12:09

## 2021-06-24 RX ADMIN — Medication SCH MLS/HR: at 12:07

## 2021-06-24 RX ADMIN — DOCUSATE SODIUM AND SENNOSIDES SCH EA: 8.6; 5 TABLET, FILM COATED ORAL at 21:24

## 2021-06-24 RX ADMIN — SIMVASTATIN SCH MG: 10 TABLET, FILM COATED ORAL at 21:24

## 2021-06-24 RX ADMIN — Medication SCH MLS/HR: at 15:48

## 2021-06-24 RX ADMIN — PROPOFOL SCH MLS/HR: 10 INJECTION, EMULSION INTRAVENOUS at 22:51

## 2021-06-24 RX ADMIN — ALBUTEROL SULFATE SCH GM: 90 AEROSOL, METERED RESPIRATORY (INHALATION) at 13:42

## 2021-06-24 RX ADMIN — Medication SCH MLS/HR: at 23:47

## 2021-06-24 RX ADMIN — ACETAMINOPHEN PRN MG: 325 TABLET ORAL at 00:52

## 2021-06-24 RX ADMIN — INSULIN ASPART SCH UNIT: 100 INJECTION, SOLUTION INTRAVENOUS; SUBCUTANEOUS at 05:15

## 2021-06-24 RX ADMIN — POTASSIUM CHLORIDE SCH MEQ: 1500 TABLET, EXTENDED RELEASE ORAL at 02:15

## 2021-06-24 RX ADMIN — INSULIN ASPART SCH UNIT: 100 INJECTION, SOLUTION INTRAVENOUS; SUBCUTANEOUS at 12:08

## 2021-06-24 RX ADMIN — DEXTROSE MONOHYDRATE SCH MLS/HR: 5 INJECTION, SOLUTION INTRAVENOUS at 22:52

## 2021-06-24 RX ADMIN — Medication SCH MLS/HR: at 00:52

## 2021-06-24 RX ADMIN — ENOXAPARIN SODIUM SCH MG: 100 INJECTION SUBCUTANEOUS at 09:03

## 2021-06-24 RX ADMIN — DEXTROSE MONOHYDRATE SCH MLS/HR: 5 INJECTION, SOLUTION INTRAVENOUS at 04:09

## 2021-06-24 RX ADMIN — PROPOFOL SCH MLS/HR: 10 INJECTION, EMULSION INTRAVENOUS at 12:08

## 2021-06-24 RX ADMIN — ALBUTEROL SULFATE SCH GM: 90 AEROSOL, METERED RESPIRATORY (INHALATION) at 21:58

## 2021-06-24 RX ADMIN — Medication SCH MLS/HR: at 03:43

## 2021-06-24 RX ADMIN — POTASSIUM CHLORIDE SCH MLS/HR: 200 INJECTION, SOLUTION INTRAVENOUS at 03:30

## 2021-06-24 RX ADMIN — DEXTROSE MONOHYDRATE SCH MLS/HR: 5 INJECTION, SOLUTION INTRAVENOUS at 10:40

## 2021-06-24 RX ADMIN — ALBUTEROL SULFATE SCH GM: 90 AEROSOL, METERED RESPIRATORY (INHALATION) at 02:27

## 2021-06-24 RX ADMIN — INSULIN ASPART SCH UNIT: 100 INJECTION, SOLUTION INTRAVENOUS; SUBCUTANEOUS at 17:24

## 2021-06-24 RX ADMIN — ALBUTEROL SULFATE SCH GM: 90 AEROSOL, METERED RESPIRATORY (INHALATION) at 10:30

## 2021-06-24 RX ADMIN — FLUTICASONE PROPIONATE AND SALMETEROL SCH EACH: 232; 14 POWDER, METERED RESPIRATORY (INHALATION) at 06:31

## 2021-06-24 RX ADMIN — PANTOPRAZOLE SODIUM SCH MG: 40 INJECTION, POWDER, FOR SOLUTION INTRAVENOUS at 09:03

## 2021-06-24 RX ADMIN — PROPOFOL SCH MLS/HR: 10 INJECTION, EMULSION INTRAVENOUS at 00:01

## 2021-06-24 RX ADMIN — ENOXAPARIN SODIUM SCH MG: 100 INJECTION SUBCUTANEOUS at 21:23

## 2021-06-24 RX ADMIN — ALBUTEROL SULFATE SCH GM: 90 AEROSOL, METERED RESPIRATORY (INHALATION) at 06:31

## 2021-06-24 RX ADMIN — ALBUTEROL SULFATE SCH GM: 90 AEROSOL, METERED RESPIRATORY (INHALATION) at 18:26

## 2021-06-24 RX ADMIN — DOCUSATE SODIUM AND SENNOSIDES SCH EA: 8.6; 5 TABLET, FILM COATED ORAL at 09:03

## 2021-06-24 RX ADMIN — POTASSIUM CHLORIDE SCH MLS/HR: 200 INJECTION, SOLUTION INTRAVENOUS at 02:15

## 2021-06-24 RX ADMIN — FLUTICASONE PROPIONATE AND SALMETEROL SCH EACH: 232; 14 POWDER, METERED RESPIRATORY (INHALATION) at 22:02

## 2021-06-24 RX ADMIN — FLUTICASONE PROPIONATE AND SALMETEROL SCH EACH: 232; 14 POWDER, METERED RESPIRATORY (INHALATION) at 21:58

## 2021-06-24 NOTE — PROGRESS NOTE
Subjective


Subjective/Events-last exam


Able to wean FiO2 to 50% and PEEP decreased to 12.





Focused Exam


Lactate Level


6/23/21 04:44: Lactic Acid Level 3.12*H


6/23/21 06:08: Lactic Acid Level 1.59





Objective


Exam


Last Set of Vital Signs





Vital Signs








  Date Time  Temp Pulse Resp B/P (MAP) Pulse Ox O2 Delivery O2 Flow Rate FiO2


 


6/24/21 16:01      Mechanical Ventilator  50


 


6/24/21 16:00  69 26 94/60 (71) 94  50.00 


 


6/24/21 09:33 37.1       





Capillary Refill : Less Than 3 Seconds


I&O











Intake and Output 


 


 6/24/21





 00:00


 


Intake Total 2830 ml


 


Output Total 1800 ml


 


Balance 1030 ml


 


 


 


Intake Oral 0 ml


 


IV Total 2550 ml


 


Tube Feeding 120 ml


 


Other 160 ml


 


Output Urine Total 1800 ml








Lungs:  Other (ronchi)


Heart:  Regular Rate, No Murmurs


Abdomen:  Normal Bowel Sounds, Soft


Extremities:  No Edema, Normal Pulses


Psych/Mental Status:  Other (sedated, intubated)





Results/Procedures


Lab


Laboratory Tests


6/23/21 19:34: Glucometer 177H


6/23/21 22:45: Glucometer 172H


6/24/21 01:30: 


White Blood Count 14.1H, Red Blood Count 4.62, Hemoglobin 12.9L, Hematocrit 38L,

Mean Corpuscular Volume 83, Mean Corpuscular Hemoglobin 28, Mean Corpuscular 

Hemoglobin Concent 34, Red Cell Distribution Width 12.8, Platelet Count 446H, 

Mean Platelet Volume 10.2, Immature Granulocyte % (Auto) 2, Neutrophils (%) 

(Auto) 86H, Lymphocytes (%) (Auto) 7L, Monocytes (%) (Auto) 4, Eosinophils (%) 

(Auto) 0, Basophils (%) (Auto) 0, Neutrophils # (Auto) 12.2H, Lymphocytes # 

(Auto) 1.0, Monocytes # (Auto) 0.6, Eosinophils # (Auto) 0.1, Basophils # (Auto)

0.0, Immature Granulocyte # (Auto) 0.3H, Blood Gas Puncture Site R RAD, Blood 

Gas Patient Temperature 37.8, Arterial Blood pH 7.42, Arterial Blood Partial 

Pressure CO2 36, Arterial Blood Partial Pressure O2 72L, Arterial Blood HCO3 22L

, Arterial Blood Total CO2 23.5, Arterial Blood Oxygen Saturation 94, Arterial 

Blood Base Excess -1.3, Gunnar Test ARTLINE, Blood Gas Ventilator Setting YES, 

Blood Gas Inspired Oxygen 45%, Sodium Level 143, Potassium Level 3.4L, Chloride 

Level 110H, Carbon Dioxide Level 20L, Anion Gap 13, Blood Urea Nitrogen 30H, 

Creatinine 1.33H, Estimat Glomerular Filtration Rate 59, BUN/Creatinine Ratio 

23, Glucose Level 212H, Calcium Level 7.8L, Phosphorus Level 3.0, Magnesium 

Level 2.4


6/24/21 05:10: Glucometer 189H


6/24/21 11:45: Glucometer 245H





Microbiology


6/22/21 Gram Stain - Final, Complete


          


6/22/21 Sputum Culture - Final, Complete


          Usual upper respiratory geno


6/19/21 Blood Culture - Final, Complete


          No growth





Assessment/Plan


Assessment/Plan





(1) COVID-19


Status:  Acute


Assessment & Plan:  s/p convalescent plasma, receiving dexamethasone. D/C 

remdesevir due to significant hypoxia/need for bipap. Appreciate Tele ICU 

recommendations. D dimer minimally elevated.





(2) Acute respiratory failure


Status:  Acute


Assessment & Plan:  Secondary to COVID19, requiring bipap, appreciate tele ICU 

assistance. 


6/22 continued worsening, required intubation and mechanical ventilation 

starting this am, appreciate [facility] recommendations. 


6/23 difficulty managing hypoxia, remains at 100% FiO2, PEEP increased to 14 per

[facility] ICU orders, proning improves status but has difficulty tolerating the 

turn, had hypotension with turning last night.


6/24 improved oxygenation, appreciate EICU management.


Qualifiers:  


   Qualified Codes:  J96.01 - Acute respiratory failure with hypoxia


(3) Diabetes mellitus, type 2


Status:  Chronic


Assessment & Plan:  On levemir 25 units BID inpatient, holding home glipizide, 

pioglitazone and canagliflozin. Sliding scale insulin.


6/22 glucose all above 200, will increase levemir to 28 units BID.


Qualifiers:  


   Qualified Codes:  E11.65 - Type 2 diabetes mellitus with hyperglycemia


(4) Hypertension


Status:  Chronic


Assessment & Plan:  Hold home lisinopril/HCTZ with low normal BP


6/22 BP increased but not significantly hypertensive, monitor and resume 

antihypertensive as needed


Qualifiers:  


   Qualified Codes:  I10 - Essential (primary) hypertension


(5) Obesity (BMI 30-39.9)


Status:  Chronic


(6) Hypotension


Status:  Acute


Assessment & Plan:  Required norepinephrine overnight, resume as needed. 

Appreciate EICU recommendations.


6/24 intermittently requiring pressor





(7) Bradycardia


Status:  Acute


Assessment & Plan:  Precedex held, suspect partially related to COVID, remaining

in 50s after holding Precedex.





(8) Acute renal insufficiency


Status:  Acute


Assessment & Plan:  Started on IVF per EICU.


6/24 trending improved





(9) On mechanically assisted ventilation


Status:  Acute


(10) At risk for stress ulcer


Status:  Acute


Assessment & Plan:  Pantoprazole





(11) Impaired nutrition


Status:  Acute


Assessment & Plan:  Tube feeds.





(12) DVT prophylaxis


Status:  Acute


Assessment & Plan:  Enoxaparin














CATRINA CHAVEZ MD             Jun 24, 2021 17:08

## 2021-06-24 NOTE — OCC THERAPY PROGRESS NOTE
Therapy Progress Note


Pt is currently intubated.  OT will continue to monitor pt status and initiate 

treatment when pt is medically stable and able to actively participate with 

skilled therapy.











MAURA YIN               Jun 24, 2021 06:58

## 2021-06-24 NOTE — TELE-ICU PROGRESS NOTE
Subjective


Date Seen by a Provider:  2021


Time Seen by a Provider:  16:58





Sepsis Event


Evaluation


Height, Weight, BMI


Height: 5'11"


Weight: 305lbs. oz. 138.436758kb; 39.92 BMI


Method:Stated





Focused Exam


Lactate Level


21 04:44: Lactic Acid Level 3.12*H


21 06:08: Lactic Acid Level 1.59





Exam


Exam


Patient acknowledged, consented, and participated in this virtual visit which 

was conducted using real time audio/video


Vital Signs








  Date Time  Temp Pulse Resp B/P (MAP) Pulse Ox O2 Delivery O2 Flow Rate FiO2


 


21 16:01      Mechanical Ventilator  50


 


21 16:00  69 26 94/60 (71) 94 Mechanical Ventilator 50.00 


 


21 15:00  72 26 98/63 (75) 94 Mechanical Ventilator 50.00 


 


21 14:00  77 26 98/53 (68) 90 Mechanical Ventilator 50.00 


 


21 13:43  63 26  92   50


 


21 13:00  67 26 131/57 (81) 92 Mechanical Ventilator 50.00 


 


21 13:00  68      


 


21 12:09  63  119/57    


 


21 12:08  63  104/56    


 


21 12:00      Mechanical Ventilator  50


 


21 12:00  66 53 95/47 (63) 93 Mechanical Ventilator 50.00 


 


21 11:00  63 26 108/58 (75) 94 Mechanical Ventilator 50.00 


 


21 10:40  64 26 102/56    


 


21 10:35  64 26 104/56    


 


21 10:31  64 26  93   50


 


21 10:00  69 26 107/57 (74) 92 Mechanical Ventilator 50.00 


 


21 09:33 37.1       


 


21 09:03 37.2       


 


21 09:00  66 26 127/59 (81) 95 Mechanical Ventilator 50.00 


 


21 08:00  71 26 116/58 (77) 94 Mechanical Ventilator 50.00 


 


21 08:00      Mechanical Ventilator  50


 


21 07:00  80 26 101/60 (74) 93 Mechanical Ventilator 50.00 


 


21 07:00  79      


 


21 06:31  82 26  94   50


 


21 06:00  81 26 126/69 (88) 97 Mechanical Ventilator 50.00 


 


21 05:27    110/66    


 


21 05:27    110/66    


 


21 05:15    99/49    


 


21 05:00  82 26 111/59 (76) 94 Mechanical Ventilator 50.00 


 


21 04:16    126/69    


 


21 04:12      Mechanical Ventilator 50.00 


 


21 04:09  80      


 


21 04:09    135/64    


 


21 04:02    79/39    


 


21 04:00  86 17 117/73 (88) 96 Mechanical Ventilator 45.00 


 


21 03:31    98/61    


 


21 03:07      Mechanical Ventilator  45


 


21 03:00  98 26 105/59 (74) 94 Mechanical Ventilator 45.00 


 


21 02:28  95 26  95   45


 


21 02:00  100 26 109/60 (76) 94 Mechanical Ventilator 45.00 


 


21 01:14 38.0       


 


21 01:00  109      


 


21 01:00  110 26 132/66 (88) 95 Mechanical Ventilator 45.00 


 


21 00:52 38.1       


 


21 00:01    114/61    


 


21 00:00    114/61    


 


21 00:00  106 26 113/61 (78) 95 Mechanical Ventilator 45.00 


 


21 23:23      Mechanical Ventilator  45


 


21 23:00  113 26 124/56 (78) 93 Mechanical Ventilator 45.00 


 


21 22:40  111 26  93   45


 


21 22:02  112      


 


21 22:00  111 24 125/58 (80) 94 Mechanical Ventilator 45.00 


 


21 21:47 38.4       


 


21 21:20      Mechanical Ventilator 45.00 


 


21 21:16 38.3       


 


21 21:00  96 26 121/61 (81) 98 Mechanical Ventilator 50.00 


 


21 20:00  91 26 124/64 (84) 96 Mechanical Ventilator 50.00 


 


21 19:58      Mechanical Ventilator  50


 


21 19:45      Mechanical Ventilator 50.00 


 


21 19:41  92 27  97   55


 


21 19:00  91      


 


21 19:00      Mechanical Ventilator 55.00 


 


21 19:00  90 27 118/61 (80) 97 Mechanical Ventilator 55.00 


 


21 18:32 38.2       


 


21 18:02 38.0       


 


21 18:00  84 33 118/64 (82) 99 Mechanical Ventilator 100.00 


 


21 17:41  80  104/58    


 


21 17:41  80  110/55    


 


21 17:00  78 45 118/64 (82) 94 Mechanical Ventilator 100.00 














I & O 


 


 21





 07:00


 


Intake Total 3270 ml


 


Output Total 1850 ml


 


Balance 1420 ml








Height & Weight


Height: 5'11"


Weight: 305lbs. oz. 138.664030hv; 39.92 BMI


Method:Stated


General Appearance:  No Apparent Distress, Anxious, Chronically ill, Obese, 

Other (sedated)


HEENT:  PERRL/EOMI, Normal ENT Inspection, Pharynx Normal, Moist Mucous 

Membranes


Neck:  Full Range of Motion, Normal Inspection, Non Tender


Respiratory:  Chest Non Tender, Lungs Clear, No Accessory Muscle Use, No 

Respiratory Distress, Decreased Breath Sounds


Cardiovascular:  Regular Rate, Rhythm, No Edema, No Gallop, No JVD, No Murmur, 

Normal Peripheral Pulses, Bradycardia


Capillary Refill:  Less Than 3 Seconds


Gastrointestinal:  normal bowel sounds, non tender, soft


Extremity:  Normal Capillary Refill, Normal Inspection, Normal Range of Motion, 

Non Tender, No Calf Tenderness, No Pedal Edema


Neurologic/Psychiatric:  Alert, Oriented x3, No Motor/Sensory Deficits, Normal 

Mood/Affect


Skin:  Normal Color, Warm/Dry


Lymphatic:  No Adenopathy





Results


Lab


Laboratory Tests


21 01:29








21 04:44








21 01:30











Assessment/Plan


Assessment/Plan


(Tele-ICU Physician , Progress Note ) 








Available chart/ vitals / labs / Images reviewed 


Video assessment done using  teleICU camera, rest of exam as per RN 


 can tot reach  RN for discussion with multiple attempts 


Events overnight :   had hypotension with turning last night.





low grade fever , and again 


hemodynamically stable, no pressors, I/O = pos 1L 


Drips:  propofol ativan 





Consultants:  





CXR r  eport reviewed.has bilateral opacities


 -   increased RLL density - atelectais ?





blood cx  - neg


sputum - ususal geno 


Urine 





Hospital course: 


=41 y/o with Hx of DM2, possible MARILY, non compliant with meds. Came in for 

COVID-19


-ICU for increased WOB and worsening hypoxia. Was on high flow @ 40 kpm with

FiO2 100%, now back on BiPAP  12/8 FiO2 90%.


- BiPAP  15/8 FiO2 100%. rr 28 tv 800, MV 28L= > intubated 


  - shock , started on levo 





VENT SETTINGS. ac - 1-- % peep 14 , tv 520 rr 26 


ABG  reviewed 





SBT - not candidate today 


 vital /Cardiovascular Stability/Sedation Score/FI02/PEEP/ABG/CXR reviewed . 





A/P 


Acute reps failur - , hypoxic , Covid PNA


- BiPAP  15/8 FiO2 100%. rr 28 tv 800, MV 28L- discussed with patient and RN in 

room - patient is getting tired with this WOB ,  looks laboured 


-  decided to intubate , patient agreed - intubated without complications , 

cont  proning latter today  2pm - 5 am . RLL atelectasis  - to follow 





COVID19


 Remdesivir 


 Decadron PO 6 - to IV  , increased dose 


 ddimet 0.5    -> lovenox proph dose 





DM II 


- levemir increased  ISShold levemir today - follow on NPO 





Leukocytosis


- ? steroids 


- follow sputum cx and PCT , RLL atelectasis vs infiltrate - off ABX 


- repeat PCT - low treshould  to start ABX 





possible MARILY





Lines :    PICC , (Central Line Necessity Reviewed) 


Balderas: 


O/22


Nutrition:  TF whele supine 


Analgesia:  fentanl prn 





Anxiety/ delirium  = propofol , intubated  = AAO 





VTE Prophylaxis:  Lov 40 q12


Stress Ulcer Prophylaxis:  PPI


Glycemic Control:  +








Plans in collaboration with  bedside consultants and IM MDs. 


RN to reach out if any questions or concerns 





A total of  45 minutes of critical care time was devoted to this patient today, 

required to treat and/or prevent further deterioration of critical care c

ondition ( as above ) .











CALVIN VASQUEZ MD         2021 16:58

## 2021-06-24 NOTE — DIAGNOSTIC IMAGING REPORT
INDICATION: Covid-19 positive and ventilator support.



Time of exam: 4:11 AM



Correlation is made with prior chest one day earlier.



ET tube remains with tip above the agustin. Bilateral pulmonary

infiltrates persist and are similar to perhaps minimally improved

when compared with yesterday. There is no effusion or

pneumothorax.



IMPRESSION: Slight improvement in bilateral pulmonary infiltrates

when compared with examination one day earlier.



Dictated by: 



  Dictated on workstation # [id number]

## 2021-06-24 NOTE — PHYSICAL THERAPY PROGRESS NOTE
Therapy Progress Note


Patient intubated and sedated this a.m.  PT will continue to monitor patient 

status and initiate treatment when patient is able to actively participate with 

skilled therapy.











LORI SY PT              Jun 24, 2021 07:58

## 2021-06-25 VITALS — DIASTOLIC BLOOD PRESSURE: 54 MMHG | SYSTOLIC BLOOD PRESSURE: 123 MMHG

## 2021-06-25 VITALS — SYSTOLIC BLOOD PRESSURE: 103 MMHG | DIASTOLIC BLOOD PRESSURE: 44 MMHG

## 2021-06-25 VITALS — DIASTOLIC BLOOD PRESSURE: 60 MMHG | SYSTOLIC BLOOD PRESSURE: 110 MMHG

## 2021-06-25 VITALS — DIASTOLIC BLOOD PRESSURE: 61 MMHG | SYSTOLIC BLOOD PRESSURE: 138 MMHG

## 2021-06-25 VITALS — DIASTOLIC BLOOD PRESSURE: 62 MMHG | SYSTOLIC BLOOD PRESSURE: 116 MMHG

## 2021-06-25 VITALS — DIASTOLIC BLOOD PRESSURE: 52 MMHG | SYSTOLIC BLOOD PRESSURE: 92 MMHG

## 2021-06-25 VITALS — DIASTOLIC BLOOD PRESSURE: 54 MMHG | SYSTOLIC BLOOD PRESSURE: 104 MMHG

## 2021-06-25 VITALS — SYSTOLIC BLOOD PRESSURE: 118 MMHG | DIASTOLIC BLOOD PRESSURE: 65 MMHG

## 2021-06-25 VITALS — DIASTOLIC BLOOD PRESSURE: 70 MMHG | SYSTOLIC BLOOD PRESSURE: 137 MMHG

## 2021-06-25 VITALS — SYSTOLIC BLOOD PRESSURE: 167 MMHG | DIASTOLIC BLOOD PRESSURE: 73 MMHG

## 2021-06-25 VITALS — SYSTOLIC BLOOD PRESSURE: 112 MMHG | DIASTOLIC BLOOD PRESSURE: 53 MMHG

## 2021-06-25 VITALS — DIASTOLIC BLOOD PRESSURE: 68 MMHG | SYSTOLIC BLOOD PRESSURE: 125 MMHG

## 2021-06-25 VITALS — SYSTOLIC BLOOD PRESSURE: 115 MMHG | DIASTOLIC BLOOD PRESSURE: 45 MMHG

## 2021-06-25 VITALS — SYSTOLIC BLOOD PRESSURE: 115 MMHG | DIASTOLIC BLOOD PRESSURE: 63 MMHG

## 2021-06-25 VITALS — SYSTOLIC BLOOD PRESSURE: 126 MMHG | DIASTOLIC BLOOD PRESSURE: 60 MMHG

## 2021-06-25 VITALS — DIASTOLIC BLOOD PRESSURE: 46 MMHG | SYSTOLIC BLOOD PRESSURE: 90 MMHG

## 2021-06-25 VITALS — SYSTOLIC BLOOD PRESSURE: 148 MMHG | DIASTOLIC BLOOD PRESSURE: 66 MMHG

## 2021-06-25 VITALS — SYSTOLIC BLOOD PRESSURE: 157 MMHG | DIASTOLIC BLOOD PRESSURE: 68 MMHG

## 2021-06-25 VITALS — SYSTOLIC BLOOD PRESSURE: 97 MMHG | DIASTOLIC BLOOD PRESSURE: 46 MMHG

## 2021-06-25 VITALS — SYSTOLIC BLOOD PRESSURE: 120 MMHG | DIASTOLIC BLOOD PRESSURE: 66 MMHG

## 2021-06-25 VITALS — DIASTOLIC BLOOD PRESSURE: 55 MMHG | SYSTOLIC BLOOD PRESSURE: 107 MMHG

## 2021-06-25 VITALS — DIASTOLIC BLOOD PRESSURE: 56 MMHG | SYSTOLIC BLOOD PRESSURE: 102 MMHG

## 2021-06-25 VITALS — SYSTOLIC BLOOD PRESSURE: 131 MMHG | DIASTOLIC BLOOD PRESSURE: 67 MMHG

## 2021-06-25 VITALS — SYSTOLIC BLOOD PRESSURE: 103 MMHG | DIASTOLIC BLOOD PRESSURE: 59 MMHG

## 2021-06-25 VITALS — DIASTOLIC BLOOD PRESSURE: 57 MMHG | SYSTOLIC BLOOD PRESSURE: 99 MMHG

## 2021-06-25 VITALS — DIASTOLIC BLOOD PRESSURE: 69 MMHG | SYSTOLIC BLOOD PRESSURE: 166 MMHG

## 2021-06-25 VITALS — SYSTOLIC BLOOD PRESSURE: 102 MMHG | DIASTOLIC BLOOD PRESSURE: 54 MMHG

## 2021-06-25 LAB
ARTERIAL PATENCY WRIST A: (no result)
BASE EXCESS STD BLDA CALC-SCNC: -2.1 MMOL/L (ref -2.5–2.5)
BASOPHILS # BLD AUTO: 0.1 10^3/UL (ref 0–0.1)
BASOPHILS NFR BLD AUTO: 1 % (ref 0–10)
BDY SITE: (no result)
BODY TEMPERATURE: 38.7
BUN/CREAT SERPL: 19
CALCIUM SERPL-MCNC: 7.7 MG/DL (ref 8.5–10.1)
CHLORIDE SERPL-SCNC: 114 MMOL/L (ref 98–107)
CO2 BLDA CALC-SCNC: 22.7 MMOL/L (ref 21–31)
CO2 SERPL-SCNC: 18 MMOL/L (ref 21–32)
CREAT SERPL-MCNC: 1.1 MG/DL (ref 0.6–1.3)
EOSINOPHIL # BLD AUTO: 0.2 10^3/UL (ref 0–0.3)
EOSINOPHIL NFR BLD AUTO: 1 % (ref 0–10)
GFR SERPLBLD BASED ON 1.73 SQ M-ARVRAT: > 60 ML/MIN
GLUCOSE SERPL-MCNC: 154 MG/DL (ref 70–105)
HCT VFR BLD CALC: 37 % (ref 40–54)
HGB BLD-MCNC: 12.4 G/DL (ref 13.3–17.7)
INHALED O2 FLOW RATE: (no result) L/MIN
LYMPHOCYTES # BLD AUTO: 1.5 10^3/UL (ref 1–4)
LYMPHOCYTES NFR BLD AUTO: 12 % (ref 12–44)
MAGNESIUM SERPL-MCNC: 2.3 MG/DL (ref 1.6–2.4)
MANUAL DIFFERENTIAL PERFORMED BLD QL: NO
MCH RBC QN AUTO: 28 PG (ref 25–34)
MCHC RBC AUTO-ENTMCNC: 34 G/DL (ref 32–36)
MCV RBC AUTO: 84 FL (ref 80–99)
MONOCYTES # BLD AUTO: 0.8 10^3/UL (ref 0–1)
MONOCYTES NFR BLD AUTO: 6 % (ref 0–12)
NEUTROPHILS # BLD AUTO: 9 10^3/UL (ref 1.8–7.8)
NEUTROPHILS NFR BLD AUTO: 72 % (ref 42–75)
PCO2 BLDA: 37 MMHG (ref 35–45)
PH BLDA: 7.4 [PH] (ref 7.37–7.43)
PHOSPHATE SERPL-MCNC: 2.5 MG/DL (ref 2.3–4.7)
PLATELET # BLD: 512 10^3/UL (ref 130–400)
PMV BLD AUTO: 10.1 FL (ref 9–12.2)
PO2 BLDA: 57 MMHG (ref 79–93)
POTASSIUM SERPL-SCNC: 3.6 MMOL/L (ref 3.6–5)
SAO2 % BLDA FROM PO2: 85 % (ref 94–100)
SODIUM SERPL-SCNC: 147 MMOL/L (ref 135–145)
TRIGL SERPL-MCNC: 202 MG/DL (ref ?–150)
VENTILATION MODE VENT: YES
WBC # BLD AUTO: 12.4 10^3/UL (ref 4.3–11)

## 2021-06-25 RX ADMIN — ALBUTEROL SULFATE SCH GM: 90 AEROSOL, METERED RESPIRATORY (INHALATION) at 02:25

## 2021-06-25 RX ADMIN — SODIUM CHLORIDE SCH MLS/HR: 900 INJECTION, SOLUTION INTRAVENOUS at 23:20

## 2021-06-25 RX ADMIN — SODIUM CHLORIDE SCH MLS/HR: 900 INJECTION, SOLUTION INTRAVENOUS at 13:06

## 2021-06-25 RX ADMIN — ALBUTEROL SULFATE SCH GM: 90 AEROSOL, METERED RESPIRATORY (INHALATION) at 10:53

## 2021-06-25 RX ADMIN — POTASSIUM CHLORIDE SCH MLS/HR: 200 INJECTION, SOLUTION INTRAVENOUS at 05:37

## 2021-06-25 RX ADMIN — ENOXAPARIN SODIUM SCH MG: 100 INJECTION SUBCUTANEOUS at 20:57

## 2021-06-25 RX ADMIN — SODIUM CHLORIDE SCH MLS/HR: 900 INJECTION, SOLUTION INTRAVENOUS at 02:09

## 2021-06-25 RX ADMIN — MAGNESIUM SULFATE IN DEXTROSE SCH MLS/HR: 10 INJECTION, SOLUTION INTRAVENOUS at 04:53

## 2021-06-25 RX ADMIN — ALBUTEROL SULFATE SCH GM: 90 AEROSOL, METERED RESPIRATORY (INHALATION) at 22:42

## 2021-06-25 RX ADMIN — Medication SCH MLS/HR: at 19:44

## 2021-06-25 RX ADMIN — INSULIN ASPART SCH UNIT: 100 INJECTION, SOLUTION INTRAVENOUS; SUBCUTANEOUS at 17:38

## 2021-06-25 RX ADMIN — PROPOFOL SCH MLS/HR: 10 INJECTION, EMULSION INTRAVENOUS at 09:41

## 2021-06-25 RX ADMIN — Medication SCH MLS/HR: at 13:09

## 2021-06-25 RX ADMIN — ALBUTEROL SULFATE SCH GM: 90 AEROSOL, METERED RESPIRATORY (INHALATION) at 06:30

## 2021-06-25 RX ADMIN — Medication SCH MLS/HR: at 01:46

## 2021-06-25 RX ADMIN — Medication SCH MLS/HR: at 06:32

## 2021-06-25 RX ADMIN — PROPOFOL SCH MLS/HR: 10 INJECTION, EMULSION INTRAVENOUS at 09:40

## 2021-06-25 RX ADMIN — POTASSIUM CHLORIDE SCH MEQ: 1500 TABLET, EXTENDED RELEASE ORAL at 04:54

## 2021-06-25 RX ADMIN — ALBUTEROL SULFATE SCH GM: 90 AEROSOL, METERED RESPIRATORY (INHALATION) at 18:29

## 2021-06-25 RX ADMIN — PANTOPRAZOLE SODIUM SCH MG: 40 INJECTION, POWDER, FOR SOLUTION INTRAVENOUS at 08:05

## 2021-06-25 RX ADMIN — DOCUSATE SODIUM AND SENNOSIDES SCH EA: 8.6; 5 TABLET, FILM COATED ORAL at 20:57

## 2021-06-25 RX ADMIN — PROPOFOL SCH MLS/HR: 10 INJECTION, EMULSION INTRAVENOUS at 23:19

## 2021-06-25 RX ADMIN — POTASSIUM CHLORIDE SCH MLS/HR: 200 INJECTION, SOLUTION INTRAVENOUS at 05:38

## 2021-06-25 RX ADMIN — INSULIN ASPART SCH UNIT: 100 INJECTION, SOLUTION INTRAVENOUS; SUBCUTANEOUS at 12:01

## 2021-06-25 RX ADMIN — DOCUSATE SODIUM AND SENNOSIDES SCH EA: 8.6; 5 TABLET, FILM COATED ORAL at 08:05

## 2021-06-25 RX ADMIN — DEXTROSE MONOHYDRATE SCH MLS/HR: 5 INJECTION, SOLUTION INTRAVENOUS at 15:53

## 2021-06-25 RX ADMIN — ACETAMINOPHEN PRN MG: 325 TABLET ORAL at 04:34

## 2021-06-25 RX ADMIN — PROPOFOL SCH MLS/HR: 10 INJECTION, EMULSION INTRAVENOUS at 23:20

## 2021-06-25 RX ADMIN — POTASSIUM CHLORIDE SCH MLS/HR: 200 INJECTION, SOLUTION INTRAVENOUS at 04:53

## 2021-06-25 RX ADMIN — DEXTROSE MONOHYDRATE SCH MLS/HR: 5 INJECTION, SOLUTION INTRAVENOUS at 10:32

## 2021-06-25 RX ADMIN — DEXTROSE MONOHYDRATE SCH MLS/HR: 5 INJECTION, SOLUTION INTRAVENOUS at 21:41

## 2021-06-25 RX ADMIN — SIMVASTATIN SCH MG: 10 TABLET, FILM COATED ORAL at 20:57

## 2021-06-25 RX ADMIN — DEXTROSE MONOHYDRATE SCH MLS/HR: 5 INJECTION, SOLUTION INTRAVENOUS at 04:40

## 2021-06-25 RX ADMIN — ENOXAPARIN SODIUM SCH MG: 100 INJECTION SUBCUTANEOUS at 08:05

## 2021-06-25 RX ADMIN — INSULIN ASPART SCH UNIT: 100 INJECTION, SOLUTION INTRAVENOUS; SUBCUTANEOUS at 23:20

## 2021-06-25 RX ADMIN — ACETAMINOPHEN PRN MG: 325 TABLET ORAL at 23:19

## 2021-06-25 RX ADMIN — ACETAMINOPHEN PRN MG: 325 TABLET ORAL at 14:37

## 2021-06-25 RX ADMIN — INSULIN ASPART SCH UNIT: 100 INJECTION, SOLUTION INTRAVENOUS; SUBCUTANEOUS at 04:54

## 2021-06-25 RX ADMIN — Medication SCH MLS/HR: at 08:24

## 2021-06-25 RX ADMIN — FLUTICASONE PROPIONATE AND SALMETEROL SCH EACH: 232; 14 POWDER, METERED RESPIRATORY (INHALATION) at 10:53

## 2021-06-25 RX ADMIN — ALBUTEROL SULFATE SCH GM: 90 AEROSOL, METERED RESPIRATORY (INHALATION) at 13:19

## 2021-06-25 RX ADMIN — FLUTICASONE PROPIONATE AND SALMETEROL SCH EACH: 232; 14 POWDER, METERED RESPIRATORY (INHALATION) at 18:29

## 2021-06-25 NOTE — PHYSICAL THERAPY PROGRESS NOTE
Therapy Progress Note


Patient intubated and sedated this a.m.  PT will continue to monitor patient 

status and initiate treatment when patient is able to actively participate with 

skilled therapy.











LORI SY PT              Jun 25, 2021 08:18

## 2021-06-25 NOTE — DIAGNOSTIC IMAGING REPORT
EXAMINATION: Chest 1 view



HISTORY: COVID positive, intubation



COMPARISON: 06/24/2021



FINDINGS: 



Heart size and pulmonary vasculature are normal. There is

decreasing lung volumes bilaterally. Increasing patchy

interstitial and airspace opacities throughout both lungs. No

significant pleural effusion or pneumothorax. Endotracheal tube

is unchanged. The osseous structures are intact.



IMPRESSION: 



1. Decreasing lung volumes with increasing opacities throughout

both lungs concerning for worsening pneumonia or increasing

superimposed atelectasis.



Report was faxed to Mike/RN Infection Control by lamin at 6:55AM.



Dictated by: 



  Dictated on workstation # [id number]

## 2021-06-25 NOTE — PROGRESS NOTE
Subjective


Subjective/Events-last exam


Febrile overnight, diaphoretic this morning.





Focused Exam


Lactate Level


6/23/21 04:44: Lactic Acid Level 3.12*H


6/23/21 06:08: Lactic Acid Level 1.59





Objective


Exam


Last Set of Vital Signs





Vital Signs








  Date Time  Temp Pulse Resp B/P (MAP) Pulse Ox O2 Delivery O2 Flow Rate FiO2


 


6/25/21 11:00 37.6 85 32 166/69 (101) 92 Mechanical Ventilator 45.00 


 


6/25/21 10:54        45





Capillary Refill : Less Than 3 Seconds


I&O











Intake and Output 


 


 6/25/21





 00:00


 


Intake Total 2800 ml


 


Output Total 1875 ml


 


Balance 925 ml


 


 


 


Intake Oral 0 ml


 


IV Total 2000 ml


 


Tube Feeding 380 ml


 


Other 420 ml


 


Output Urine Total 1875 ml








General:  Other (sedated, intubated, diaphoretic)


Lungs:  Other (decreased air movement)


Heart:  Regular Rate


Abdomen:  Normal Bowel Sounds, Soft


Extremities:  Other (bounding peripheral pulses)





Results/Procedures


Lab


Laboratory Tests


6/24/21 11:45: Glucometer 245H


6/24/21 17:20: Glucometer 216H


6/24/21 23:44: Glucometer 160H


6/25/21 03:55: 


White Blood Count 12.4H, Red Blood Count 4.36, Hemoglobin 12.4L, Hematocrit 37L,

Mean Corpuscular Volume 84, Mean Corpuscular Hemoglobin 28, Mean Corpuscular 

Hemoglobin Concent 34, Red Cell Distribution Width 13.0, Platelet Count 512H, 

Mean Platelet Volume 10.1, Immature Granulocyte % (Auto) 8, Neutrophils (%) 

(Auto) 72, Lymphocytes (%) (Auto) 12, Monocytes (%) (Auto) 6, Eosinophils (%) 

(Auto) 1, Basophils (%) (Auto) 1, Neutrophils # (Auto) 9.0H, Lymphocytes # 

(Auto) 1.5, Monocytes # (Auto) 0.8, Eosinophils # (Auto) 0.2, Basophils # (Auto)

0.1, Immature Granulocyte # (Auto) 1.0H, Blood Gas Puncture Site RG ART, Blood

Gas Patient Temperature 38.7, Arterial Blood pH 7.40, Arterial Blood Partial 

Pressure CO2 37, Arterial Blood Partial Pressure O2 57L, Arterial Blood HCO3 22L

, Arterial Blood Total CO2 22.7, Arterial Blood Oxygen Saturation 85L, Arterial 

Blood Base Excess -2.1, Gunnar Test ARTLINE, Blood Gas Ventilator Setting YES, 

Blood Gas Inspired Oxygen 40%, Sodium Level 147H, Potassium Level 3.6, Chloride 

Level 114H, Carbon Dioxide Level 18L, Anion Gap 15H, Blood Urea Nitrogen 21H, 

Creatinine 1.10, Estimat Glomerular Filtration Rate > 60, BUN/Creatinine Ratio 

19, Glucose Level 154H, Calcium Level 7.7L, Phosphorus Level 2.5, Magnesium 

Level 2.3, Triglycerides Level 202H, Procalcitonin 0.93H





Microbiology


6/22/21 Gram Stain - Final, Complete


          


6/22/21 Sputum Culture - Final, Complete


          Usual upper respiratory geno


6/19/21 Blood Culture - Final, Complete


          No growth





Assessment/Plan


Assessment/Plan





(1) COVID-19


Status:  Acute


Assessment & Plan:  s/p convalescent plasma, receiving dexamethasone. D/C 

remdesevir due to significant hypoxia/need for bipap. Appreciate Tele ICU 

recommendations. D dimer minimally elevated.





(2) Acute respiratory failure


Status:  Acute


Assessment & Plan:  Secondary to COVID19, requiring bipap, appreciate tele ICU 

assistance. 


6/22 continued worsening, required intubation and mechanical ventilation sta

rting this am, appreciate [facility] recommendations. 


6/23 difficulty managing hypoxia, remains at 100% FiO2, PEEP increased to 14 per

[facility] ICU orders, proning improves status but has difficulty tolerating the 

turn, had hypotension with turning last night.


6/24 improved oxygenation, appreciate EICU management.


Qualifiers:  


   Qualified Codes:  J96.01 - Acute respiratory failure with hypoxia


(3) Diabetes mellitus, type 2


Status:  Chronic


Assessment & Plan:  On levemir 25 units BID inpatient, holding home glipizide, 

pioglitazone and canagliflozin. Sliding scale insulin.


6/22 glucose all above 200, will increase levemir to 28 units BID.


6/25 glucose back to in general above 200, increase levemir to 30 units BID


Qualifiers:  


   Qualified Codes:  E11.65 - Type 2 diabetes mellitus with hyperglycemia


(4) Hypertension


Status:  Chronic


Assessment & Plan:  Hold home lisinopril/HCTZ with low normal BP


6/22 BP increased but not significantly hypertensive, monitor and resume 

antihypertensive as needed


Qualifiers:  


   Qualified Codes:  I10 - Essential (primary) hypertension


(5) Obesity (BMI 30-39.9)


Status:  Chronic


(6) Hypotension


Status:  Acute


Assessment & Plan:  Required norepinephrine overnight, resume as needed. 

Appreciate EICU recommendations.


6/24 intermittently requiring pressor





(7) Bradycardia


Status:  Acute


Assessment & Plan:  Precedex held, suspect partially related to COVID, remaining

in 50s after holding Precedex.





(8) Acute renal insufficiency


Status:  Acute


Assessment & Plan:  Started on IVF per EICU.


6/24 trending improved





(9) On mechanically assisted ventilation


Status:  Acute


(10) At risk for stress ulcer


Status:  Acute


Assessment & Plan:  Pantoprazole





(11) Impaired nutrition


Status:  Acute


Assessment & Plan:  Tube feeds.





(12) Hypernatremia


Status:  Acute


Assessment & Plan:  Increase free water flushes to 35 cc with tube feeds.





(13) DVT prophylaxis


Status:  Acute


Assessment & Plan:  Enoxaparin














CATRINA CHAVEZ MD             Jun 25, 2021 11:21

## 2021-06-25 NOTE — OCC THERAPY PROGRESS NOTE
Therapy Progress Note


Pt is currently intubated.  OT will continue to monitor pt status and initiate 

treatment when pt is medically stable and able to actively participate with 

skilled therapy.











MAURA YIN               Jun 25, 2021 06:59

## 2021-06-25 NOTE — TELE-ICU PROGRESS NOTE
Subjective


Date Seen by a Provider:  Jun 25, 2021


Time Seen by a Provider:  11:05


Subjective/Events-last exam


chart reviewed, labs, x-ray reviewed and d/w rn. video visit made


Review of Systems


Pulmonary:  Dyspnea, Cough, Other (he is intubated and put on vent, sedated)





Sepsis Event


Evaluation


Height, Weight, BMI


Height: 5'11"


Weight: 305lbs. oz. 138.588292lk; 39.92 BMI


Method:Stated





Focused Exam


Lactate Level


6/23/21 04:44: Lactic Acid Level 3.12*H


6/23/21 06:08: Lactic Acid Level 1.59





Exam


Exam


Patient acknowledged, consented, and participated in this virtual visit which 

was conducted using real time audio/video


Vital Signs








  Date Time  Temp Pulse Resp B/P (MAP) Pulse Ox O2 Delivery O2 Flow Rate FiO2


 


6/25/21 11:00 37.6 85 32 166/69 (101) 92 Mechanical Ventilator 45.00 


 


6/25/21 10:54  84 30  89   45


 


6/25/21 10:32  78 26 146/61    


 


6/25/21 10:00 37.9 75 27 138/61 (86) 90 Mechanical Ventilator 45.00 


 


6/25/21 09:47     88 Mechanical Ventilator 45.00 


 


6/25/21 09:41  78  146/61    


 


6/25/21 09:40  78  146/61    


 


6/25/21 09:00 38.1 73  157/68 (97) 91 Mechanical Ventilator 40.00 


 


6/25/21 08:24  79  91/50    


 


6/25/21 08:05     92 Mechanical Ventilator  40


 


6/25/21 08:00 38.2 79 26 92/52 (65) 88 Mechanical Ventilator 40.00 


 


6/25/21 07:00  91      


 


6/25/21 07:00 38.4 87 26 90/46 (61) 87 Mechanical Ventilator 40.00 


 


6/25/21 06:26  92 26  92   40


 


6/25/21 06:00 38.4 93 26 97/46 (63) 92 Mechanical Ventilator 40.00 


 


6/25/21 05:03 38.6       


 


6/25/21 05:00 38.7 92 26 103/44 (63) 91 Mechanical Ventilator 40.00 


 


6/25/21 04:40  94 26 130/54    


 


6/25/21 04:34 38.7       


 


6/25/21 04:00     91 Mechanical Ventilator  40


 


6/25/21 04:00 38.7 96 26 112/53 (72) 90 Mechanical Ventilator 40.00 


 


6/25/21 03:53 38.7     Mechanical Ventilator 40.00 


 


6/25/21 03:00 38.4 89 26 123/54 (77) 93 Mechanical Ventilator 40.00 


 


6/25/21 02:25  85 26  95   40


 


6/25/21 02:00 38.1 82 26 116/62 (80) 95 Mechanical Ventilator 40.00 


 


6/25/21 01:00  80      


 


6/25/21 01:00 38.0 77 26 131/67 (88) 96 Mechanical Ventilator 40.00 


 


6/25/21 00:00 37.8 78 26 110/60 (77) 94 Mechanical Ventilator 40.00 


 


6/24/21 23:40     95 Mechanical Ventilator  40


 


6/24/21 23:00 37.7 75 26 123/68 (86) 94 Mechanical Ventilator 40.00 


 


6/24/21 22:52  74 26 116/64    


 


6/24/21 22:51  74  116/64    


 


6/24/21 22:51  74  116/64    


 


6/24/21 22:00 37.7 77 26 115/60 (78) 96 Mechanical Ventilator 40.00 


 


6/24/21 21:59  77 26  94   40


 


6/24/21 21:00 37.4 75 26 121/65 (83) 96 Mechanical Ventilator 40.00 


 


6/24/21 20:00 37.2 67 26 121/60 (80) 93 Mechanical Ventilator 40.00 


 


6/24/21 19:50     95 Mechanical Ventilator  40


 


6/24/21 19:00  71      


 


6/24/21 19:00 37.3     Mechanical Ventilator 40.00 


 


6/24/21 19:00 37.3 70 26 103/60 (74) 91 Mechanical Ventilator 40.00 


 


6/24/21 18:27  74 26  91   40


 


6/24/21 18:00  79 26 112/64 (80) 92 Mechanical Ventilator 70.00 


 


6/24/21 17:15  67 27 105/62    


 


6/24/21 17:00 36.6       


 


6/24/21 17:00  66 26 95/56 (69) 94 Mechanical Ventilator 70.00 


 


6/24/21 16:01      Mechanical Ventilator  50


 


6/24/21 16:00  69 26 94/60 (71) 94 Mechanical Ventilator 70.00 


 


6/24/21 15:00  72 26 98/63 (75) 94 Mechanical Ventilator 50.00 


 


6/24/21 14:39      Mechanical Ventilator 70.00 


 


6/24/21 14:00  77 26 98/53 (68) 90 Mechanical Ventilator 50.00 


 


6/24/21 13:43  63 26  92   50


 


6/24/21 13:00  67 26 131/57 (81) 92 Mechanical Ventilator 50.00 


 


6/24/21 13:00  68      


 


6/24/21 12:33      Mechanical Ventilator 45.00 


 


6/24/21 12:09  63  119/57    


 


6/24/21 12:08  63  104/56    


 


6/24/21 12:00      Mechanical Ventilator  50


 


6/24/21 12:00  66 53 95/47 (63) 93 Mechanical Ventilator 50.00 














I & O 


 


 6/25/21





 07:00


 


Intake Total 3230 ml


 


Output Total 1710 ml


 


Balance 1520 ml








Height & Weight


Height: 5'11"


Weight: 305lbs. oz. 138.003268es; 39.92 BMI


Method:Stated


General Appearance:  No Apparent Distress, Anxious, Chronically ill, Obese, 

Other (sedated)


HEENT:  PERRL/EOMI, Normal ENT Inspection, Pharynx Normal, Moist Mucous 

Membranes


Neck:  Full Range of Motion, Normal Inspection, Non Tender


Respiratory:  Chest Non Tender, Lungs Clear, No Accessory Muscle Use, No 

Respiratory Distress, Decreased Breath Sounds


Cardiovascular:  Regular Rate, Rhythm, No Edema, No Gallop, No JVD, No Murmur, 

Normal Peripheral Pulses, Bradycardia


Capillary Refill:  Less Than 3 Seconds


Gastrointestinal:  normal bowel sounds, non tender, soft


Extremity:  Normal Capillary Refill, Normal Inspection, Normal Range of Motion, 

Non Tender, No Calf Tenderness, No Pedal Edema


Neurologic/Psychiatric:  Alert, Oriented x3, No Motor/Sensory Deficits, Normal 

Mood/Affect


Skin:  Normal Color, Warm/Dry


Lymphatic:  No Adenopathy





Results


Lab


Laboratory Tests


6/24/21 01:30








6/25/21 03:55











Assessment/Plan


Assessment/Plan


1. COVID-19 PNEUMONIA ON TREATMENT WITH DEXA METHASONE


2. ACUTE HYPOXIC RESPIRATORY FAILURE, ON VENT AC 26/520/40/12 PEEP.


3.DIABETES MELLITUS UNCONTROLLED PARTLY DUE TO STEROIDS, ON SS COVERAGE.


4. MORBID OBESITY, NEEDS TO LOOSE WEIGHT ONCE RECOVERED FROM CURRENT ILLNESS.


5. DVT PRPHYLAXIS. ON LOVENOX.


6. NUTRITION. ON INTERMITTENT TF


7.GI PROPHYLAXIS , ON IV PROTONIX





PLAN. 





PRONING TO DAY.


CONTINUE CURRENT VENT SETTINGS AND WEAN PEEP IF POSSIBLE


CONTINUE SS COVERAGE AND DEXAMETHASONE


Critical Care:  Critically Ill Patient


Time spent with patient (mins):  40





Diagnosis/Problems


Diagnosis/Problems





(1) Acute respiratory failure


Status:  Acute


Qualifiers:  


   Qualified Codes:  J96.01 - Acute respiratory failure with hypoxia


(2) COVID-19


Status:  Acute


(3) Diabetes mellitus, type 2


Status:  Chronic


Qualifiers:  


   Qualified Codes:  E11.65 - Type 2 diabetes mellitus with hyperglycemia


(4) On mechanically assisted ventilation


Status:  Acute


(5) Obesity (BMI 30-39.9)


Status:  Chronic











CARTER STEVENSON MD                Jun 25, 2021 11:59

## 2021-06-26 VITALS — SYSTOLIC BLOOD PRESSURE: 115 MMHG | DIASTOLIC BLOOD PRESSURE: 68 MMHG

## 2021-06-26 VITALS — SYSTOLIC BLOOD PRESSURE: 119 MMHG | DIASTOLIC BLOOD PRESSURE: 58 MMHG

## 2021-06-26 VITALS — SYSTOLIC BLOOD PRESSURE: 128 MMHG | DIASTOLIC BLOOD PRESSURE: 62 MMHG

## 2021-06-26 VITALS — DIASTOLIC BLOOD PRESSURE: 57 MMHG | SYSTOLIC BLOOD PRESSURE: 100 MMHG

## 2021-06-26 VITALS — SYSTOLIC BLOOD PRESSURE: 122 MMHG | DIASTOLIC BLOOD PRESSURE: 58 MMHG

## 2021-06-26 VITALS — SYSTOLIC BLOOD PRESSURE: 123 MMHG | DIASTOLIC BLOOD PRESSURE: 56 MMHG

## 2021-06-26 VITALS — SYSTOLIC BLOOD PRESSURE: 129 MMHG | DIASTOLIC BLOOD PRESSURE: 61 MMHG

## 2021-06-26 VITALS — SYSTOLIC BLOOD PRESSURE: 124 MMHG | DIASTOLIC BLOOD PRESSURE: 63 MMHG

## 2021-06-26 VITALS — DIASTOLIC BLOOD PRESSURE: 62 MMHG | SYSTOLIC BLOOD PRESSURE: 103 MMHG

## 2021-06-26 VITALS — SYSTOLIC BLOOD PRESSURE: 107 MMHG | DIASTOLIC BLOOD PRESSURE: 57 MMHG

## 2021-06-26 VITALS — DIASTOLIC BLOOD PRESSURE: 56 MMHG | SYSTOLIC BLOOD PRESSURE: 144 MMHG

## 2021-06-26 VITALS — DIASTOLIC BLOOD PRESSURE: 71 MMHG | SYSTOLIC BLOOD PRESSURE: 120 MMHG

## 2021-06-26 VITALS — DIASTOLIC BLOOD PRESSURE: 58 MMHG | SYSTOLIC BLOOD PRESSURE: 107 MMHG

## 2021-06-26 VITALS — SYSTOLIC BLOOD PRESSURE: 108 MMHG | DIASTOLIC BLOOD PRESSURE: 45 MMHG

## 2021-06-26 VITALS — SYSTOLIC BLOOD PRESSURE: 157 MMHG | DIASTOLIC BLOOD PRESSURE: 69 MMHG

## 2021-06-26 VITALS — SYSTOLIC BLOOD PRESSURE: 96 MMHG | DIASTOLIC BLOOD PRESSURE: 58 MMHG

## 2021-06-26 VITALS — DIASTOLIC BLOOD PRESSURE: 50 MMHG | SYSTOLIC BLOOD PRESSURE: 106 MMHG

## 2021-06-26 VITALS — DIASTOLIC BLOOD PRESSURE: 58 MMHG | SYSTOLIC BLOOD PRESSURE: 126 MMHG

## 2021-06-26 VITALS — SYSTOLIC BLOOD PRESSURE: 113 MMHG | DIASTOLIC BLOOD PRESSURE: 59 MMHG

## 2021-06-26 VITALS — SYSTOLIC BLOOD PRESSURE: 100 MMHG | DIASTOLIC BLOOD PRESSURE: 48 MMHG

## 2021-06-26 VITALS — SYSTOLIC BLOOD PRESSURE: 125 MMHG | DIASTOLIC BLOOD PRESSURE: 59 MMHG

## 2021-06-26 VITALS — DIASTOLIC BLOOD PRESSURE: 60 MMHG | SYSTOLIC BLOOD PRESSURE: 104 MMHG

## 2021-06-26 VITALS — DIASTOLIC BLOOD PRESSURE: 58 MMHG | SYSTOLIC BLOOD PRESSURE: 109 MMHG

## 2021-06-26 VITALS — SYSTOLIC BLOOD PRESSURE: 134 MMHG | DIASTOLIC BLOOD PRESSURE: 61 MMHG

## 2021-06-26 VITALS — DIASTOLIC BLOOD PRESSURE: 59 MMHG | SYSTOLIC BLOOD PRESSURE: 96 MMHG

## 2021-06-26 VITALS — DIASTOLIC BLOOD PRESSURE: 59 MMHG | SYSTOLIC BLOOD PRESSURE: 98 MMHG

## 2021-06-26 VITALS — DIASTOLIC BLOOD PRESSURE: 60 MMHG | SYSTOLIC BLOOD PRESSURE: 129 MMHG

## 2021-06-26 VITALS — DIASTOLIC BLOOD PRESSURE: 44 MMHG | SYSTOLIC BLOOD PRESSURE: 93 MMHG

## 2021-06-26 VITALS — SYSTOLIC BLOOD PRESSURE: 108 MMHG | DIASTOLIC BLOOD PRESSURE: 56 MMHG

## 2021-06-26 LAB
ARTERIAL PATENCY WRIST A: (no result)
BASE EXCESS STD BLDA CALC-SCNC: -3.3 MMOL/L (ref -2.5–2.5)
BASOPHILS # BLD AUTO: 0.1 10^3/UL (ref 0–0.1)
BASOPHILS NFR BLD AUTO: 0 % (ref 0–10)
BDY SITE: (no result)
BODY TEMPERATURE: 38.4
BUN/CREAT SERPL: 18
CALCIUM SERPL-MCNC: 7.6 MG/DL (ref 8.5–10.1)
CHLORIDE SERPL-SCNC: 115 MMOL/L (ref 98–107)
CO2 BLDA CALC-SCNC: 22 MMOL/L (ref 21–31)
CO2 SERPL-SCNC: 19 MMOL/L (ref 21–32)
CREAT SERPL-MCNC: 0.96 MG/DL (ref 0.6–1.3)
EOSINOPHIL # BLD AUTO: 0.2 10^3/UL (ref 0–0.3)
EOSINOPHIL NFR BLD AUTO: 2 % (ref 0–10)
GFR SERPLBLD BASED ON 1.73 SQ M-ARVRAT: > 60 ML/MIN
GLUCOSE SERPL-MCNC: 206 MG/DL (ref 70–105)
HCT VFR BLD CALC: 37 % (ref 40–54)
HGB BLD-MCNC: 11.9 G/DL (ref 13.3–17.7)
INHALED O2 FLOW RATE: (no result) L/MIN
LYMPHOCYTES # BLD AUTO: 1.4 10^3/UL (ref 1–4)
LYMPHOCYTES NFR BLD AUTO: 9 % (ref 12–44)
MAGNESIUM SERPL-MCNC: 2.2 MG/DL (ref 1.6–2.4)
MANUAL DIFFERENTIAL PERFORMED BLD QL: YES
MCH RBC QN AUTO: 28 PG (ref 25–34)
MCHC RBC AUTO-ENTMCNC: 33 G/DL (ref 32–36)
MCV RBC AUTO: 85 FL (ref 80–99)
METAMYELOCYTES NFR BLD: 2 %
MONOCYTES # BLD AUTO: 0.6 10^3/UL (ref 0–1)
MONOCYTES NFR BLD AUTO: 4 % (ref 0–12)
MONOCYTES NFR BLD: 4 %
NEUTROPHILS # BLD AUTO: 12.1 10^3/UL (ref 1.8–7.8)
NEUTROPHILS NFR BLD AUTO: 79 % (ref 42–75)
NEUTS BAND NFR BLD MANUAL: 81 %
NEUTS BAND NFR BLD: 1 %
PCO2 BLDA: 38 MMHG (ref 35–45)
PH BLDA: 7.37 [PH] (ref 7.37–7.43)
PHOSPHATE SERPL-MCNC: 2.7 MG/DL (ref 2.3–4.7)
PLATELET # BLD: 484 10^3/UL (ref 130–400)
PMV BLD AUTO: 9.9 FL (ref 9–12.2)
PO2 BLDA: 60 MMHG (ref 79–93)
POTASSIUM SERPL-SCNC: 4 MMOL/L (ref 3.6–5)
RBC MORPH BLD: NORMAL
SAO2 % BLDA FROM PO2: 86 % (ref 94–100)
SODIUM SERPL-SCNC: 148 MMOL/L (ref 135–145)
VARIANT LYMPHS NFR BLD MANUAL: 12 %
VENTILATION MODE VENT: YES
WBC # BLD AUTO: 15.4 10^3/UL (ref 4.3–11)

## 2021-06-26 RX ADMIN — DEXTROSE MONOHYDRATE SCH MLS/HR: 5 INJECTION, SOLUTION INTRAVENOUS at 03:45

## 2021-06-26 RX ADMIN — POTASSIUM CHLORIDE SCH MLS/HR: 200 INJECTION, SOLUTION INTRAVENOUS at 04:38

## 2021-06-26 RX ADMIN — FLUTICASONE PROPIONATE AND SALMETEROL SCH EACH: 232; 14 POWDER, METERED RESPIRATORY (INHALATION) at 10:37

## 2021-06-26 RX ADMIN — INSULIN ASPART SCH UNIT: 100 INJECTION, SOLUTION INTRAVENOUS; SUBCUTANEOUS at 18:14

## 2021-06-26 RX ADMIN — Medication SCH MLS/HR: at 15:37

## 2021-06-26 RX ADMIN — PROPOFOL SCH MLS/HR: 10 INJECTION, EMULSION INTRAVENOUS at 06:06

## 2021-06-26 RX ADMIN — DEXTROSE MONOHYDRATE AND SODIUM CHLORIDE SCH MLS/HR: 5; .45 INJECTION, SOLUTION INTRAVENOUS at 10:56

## 2021-06-26 RX ADMIN — Medication SCH MLS/HR: at 08:27

## 2021-06-26 RX ADMIN — DEXTROSE MONOHYDRATE AND SODIUM CHLORIDE SCH MLS/HR: 5; .45 INJECTION, SOLUTION INTRAVENOUS at 22:59

## 2021-06-26 RX ADMIN — INSULIN ASPART SCH UNIT: 100 INJECTION, SOLUTION INTRAVENOUS; SUBCUTANEOUS at 11:08

## 2021-06-26 RX ADMIN — POTASSIUM CHLORIDE SCH MEQ: 1500 TABLET, EXTENDED RELEASE ORAL at 04:38

## 2021-06-26 RX ADMIN — INSULIN ASPART SCH UNIT: 100 INJECTION, SOLUTION INTRAVENOUS; SUBCUTANEOUS at 23:03

## 2021-06-26 RX ADMIN — PANTOPRAZOLE SODIUM SCH MG: 40 INJECTION, POWDER, FOR SOLUTION INTRAVENOUS at 08:03

## 2021-06-26 RX ADMIN — Medication SCH MLS/HR: at 16:07

## 2021-06-26 RX ADMIN — PROPOFOL SCH MLS/HR: 10 INJECTION, EMULSION INTRAVENOUS at 12:40

## 2021-06-26 RX ADMIN — Medication SCH MLS/HR: at 22:59

## 2021-06-26 RX ADMIN — Medication SCH MLS/HR: at 02:07

## 2021-06-26 RX ADMIN — ACETAMINOPHEN PRN MG: 325 TABLET ORAL at 09:53

## 2021-06-26 RX ADMIN — ALBUTEROL SULFATE SCH GM: 90 AEROSOL, METERED RESPIRATORY (INHALATION) at 18:21

## 2021-06-26 RX ADMIN — PROPOFOL SCH MLS/HR: 10 INJECTION, EMULSION INTRAVENOUS at 18:17

## 2021-06-26 RX ADMIN — FLUTICASONE PROPIONATE AND SALMETEROL SCH EACH: 232; 14 POWDER, METERED RESPIRATORY (INHALATION) at 18:21

## 2021-06-26 RX ADMIN — ALBUTEROL SULFATE SCH GM: 90 AEROSOL, METERED RESPIRATORY (INHALATION) at 02:03

## 2021-06-26 RX ADMIN — ACETAMINOPHEN PRN MG: 325 TABLET ORAL at 05:37

## 2021-06-26 RX ADMIN — ENOXAPARIN SODIUM SCH MG: 100 INJECTION SUBCUTANEOUS at 08:02

## 2021-06-26 RX ADMIN — ENOXAPARIN SODIUM SCH MG: 100 INJECTION SUBCUTANEOUS at 20:43

## 2021-06-26 RX ADMIN — ALBUTEROL SULFATE SCH GM: 90 AEROSOL, METERED RESPIRATORY (INHALATION) at 10:37

## 2021-06-26 RX ADMIN — DEXTROSE MONOHYDRATE SCH MLS/HR: 5 INJECTION, SOLUTION INTRAVENOUS at 09:41

## 2021-06-26 RX ADMIN — PROPOFOL SCH MLS/HR: 10 INJECTION, EMULSION INTRAVENOUS at 12:39

## 2021-06-26 RX ADMIN — ALBUTEROL SULFATE SCH GM: 90 AEROSOL, METERED RESPIRATORY (INHALATION) at 22:18

## 2021-06-26 RX ADMIN — PROPOFOL SCH MLS/HR: 10 INJECTION, EMULSION INTRAVENOUS at 18:18

## 2021-06-26 RX ADMIN — ALBUTEROL SULFATE SCH GM: 90 AEROSOL, METERED RESPIRATORY (INHALATION) at 06:41

## 2021-06-26 RX ADMIN — MAGNESIUM SULFATE IN DEXTROSE SCH MLS/HR: 10 INJECTION, SOLUTION INTRAVENOUS at 04:38

## 2021-06-26 RX ADMIN — ALBUTEROL SULFATE SCH GM: 90 AEROSOL, METERED RESPIRATORY (INHALATION) at 14:23

## 2021-06-26 RX ADMIN — DOCUSATE SODIUM AND SENNOSIDES SCH EA: 8.6; 5 TABLET, FILM COATED ORAL at 08:03

## 2021-06-26 RX ADMIN — DOCUSATE SODIUM AND SENNOSIDES SCH EA: 8.6; 5 TABLET, FILM COATED ORAL at 20:43

## 2021-06-26 RX ADMIN — SIMVASTATIN SCH MG: 10 TABLET, FILM COATED ORAL at 20:43

## 2021-06-26 RX ADMIN — Medication SCH MLS/HR: at 02:06

## 2021-06-26 RX ADMIN — INSULIN ASPART SCH UNIT: 100 INJECTION, SOLUTION INTRAVENOUS; SUBCUTANEOUS at 05:31

## 2021-06-26 NOTE — DIAGNOSTIC IMAGING REPORT
INDICATION: Intubation 



COMPARISON: 6:25



FINDINGS: ET tube mid thoracic trachea. OG catheter goes into the

stomach. There is severe patchy 5 lobed airspace infiltrates, not

substantially changed from earlier. No obvious pleural fluid or

pneumothorax. 



IMPRESSION: Severe 5 lobed airspace disease. Support apparatus in

stable alignment. No significant change. 



Dictated by: 



  Dictated on workstation # [id number]

## 2021-06-26 NOTE — TELE-ICU PROGRESS NOTE
Progress Note


video rounds completed





41 y/o morbidly obese male now intubated for Covid PNA


Curent vent settings: 26/520/100%/12





Has received remdesivir and on decadron





Currently on ativan drip, fentanyl drip, propofol drip for sedation





On levophed for BP





DVT px with lovenox 40mg BID and PPI for GI prophylaxis





PE:





currently sedated on vent


/47


O2 sat: 91%


Pulse: NSR at 74


Temp 38.3





Labs: 


wbc 15.4


Hgb: 11.9


Plts: 484


AB.37/38/60/21


Na 148


K: 4


Cl: 115


CO2: 19


BUN: 17


Creat: 0.96


Glu: 206





IMP: severe COVID PNA now ventilated and being proned





PLAN: continue full vent support


wean as feasable, moderate sedation as feasable





Insulin for glucose control, on sliding scale and 20 u Novolog daily





Focused Exam


Height, Weight, BMI


Height: 5'11"


Weight: 305lbs. oz. 138.285782eh; 39.92 BMI


Method:Stated











LAVINIA ABEL MD            2021 07:56

## 2021-06-26 NOTE — PHYSICAL THERAPY PROGRESS NOTE
Therapy Progress Note


Patient intubated and sedated this a.m.  PT will continue to monitor patient 

status and initiate treatment when patient is able to actively participate with 

skilled therapy.











LORI SY PT              Jun 26, 2021 07:18

## 2021-06-26 NOTE — PROGRESS NOTE - HOSPITALIST
Subjective


HPI/CC On Admission


Date Seen by Provider:  Jun 26, 2021


Time Seen by Provider:  10:51


Chief complaint: Shortness of breath due to Covid





History of present illness: This is a 40-year-old white male diabetic noncom

pliant with diabetic medication who does not use oxygen or CPAP machine but 

appears to be high risk for CPAP due to neck circumference and BMI of 40 who 

presented to the ER with shortness of breath was diagnosed with Covid and he is 

hypoxic.  Patient has progressed throughout the day and at 2100 hrs. the 

decision was made to transfer up to the ICU for pulmonary critical care 

management due to maxed on Vapotherm likely will need BiPAP and possible 

intubation.  His blood sugars remain in the 300s with aggressive insulin regimen

trying to decrease the level.  He does not currently smoke or drink alcohol any 

works for [employer].  He has not been vaccinated against COVID-19.


Subjective/Events-last exam


Patient sedated on mechanical ventilation with no improvement in oxygenation 

status and no change in 5 lobe infiltrate.  No new care problems per nursing 

staff.





Objective


Exam


Vital Signs





Vital Signs








  Date Time  Temp Pulse Resp B/P (MAP) Pulse Ox O2 Delivery O2 Flow Rate FiO2


 


6/26/21 10:23 38.1       


 


6/26/21 10:00  70 26 98/59 (72) 91 Mechanical Ventilator 100.00 


 


6/26/21 08:00        100





Capillary Refill : Less Than 3 Seconds


General Appearance:  No Apparent Distress, Chronically ill, Obese


Respiratory:  No Accessory Muscle Use, No Respiratory Distress, Other


Cardiovascular:  Regular Rate, Rhythm, No Murmur


Gastrointestinal:  Other (Scattered rhonchi with coarse breath sounds throughout

abdomen is not distended nor is it firm bowel sounds are not noted.  There is no

reaction to palpation of the abdomen and no mass appreciated.)


Extremity:  Other (2+ edema upper extremities 1+ lower no purpura or rash.)





Results/Procedures


Lab


Laboratory Tests


6/26/21 04:00








Patient resulted labs reviewed.





Assessment/Plan


Assessment and Plan


Assess & Plan/Chief Complaint


(1) COVID-19


Status:  Acute


Assessment & Plan:  s/p convalescent plasma, receiving dexamethasone. D/C 

remdesevir due to significant hypoxia/need for bipap. Appreciate Tele ICU 

recommendations. D dimer minimally elevated.





(2) Acute respiratory failure


Status:  Acute


Assessment & Plan:  Secondary to COVID19, requiring bipap, appreciate tele ICU 

assistance. 


6/22 continued worsening, required intubation and mechanical ventilation st

arting this am, appreciate [facility] recommendations. 


6/23 difficulty managing hypoxia, remains at 100% FiO2, PEEP increased to 14 per

[facility] ICU orders, proning improves status but has difficulty tolerating the 

turn, had hypotension with turning last night.


6/24 improved oxygenation, appreciate EICU management.


6/26 no improvement in respiratory status or chest x-ray requiring 100% FiO2 and

12 of PEEP O2 saturations 89 to 90% predominantly.  No evidence for purulence in

suctioned secretions per staff.  White count elevation likely secondary to 

Decadron continue to monitor. Prognosis remains extremely poor.


Qualifiers:  


   Qualified Codes:  J96.01 - Acute respiratory failure with hypoxia


(3) Diabetes mellitus, type 2


Status:  Chronic


Assessment & Plan:  On levemir 25 units BID inpatient, holding home glipizide, 

pioglitazone and canagliflozin. Sliding scale insulin.


6/22 glucose all above 200, will increase levemir to 28 units BID.


6/25 glucose back to in general above 200, increase levemir to 30 units BID


Qualifiers:  


   Qualified Codes:  E11.65 - Type 2 diabetes mellitus with hyperglycemia


(4) Hypertension


Status:  Chronic


Assessment & Plan:  Hold home lisinopril/HCTZ with low normal BP


6/22 BP increased but not significantly hypertensive, monitor and resume 

antihypertensive as needed


Qualifiers:  


   Qualified Codes:  I10 - Essential (primary) hypertension


(5) Obesity (BMI 30-39.9)


Status:  Chronic


(6) Hypotension


Status:  Acute


Assessment & Plan:  Required norepinephrine overnight, resume as needed. 

Appreciate EICU recommendations.


6/24 intermittently requiring pressor





(7) Bradycardia


Status:  Acute


Assessment & Plan:  Precedex held, suspect partially related to COVID, remaining

in 50s after holding Precedex.





(8) Acute renal insufficiency


Status:  Acute


Assessment & Plan:  Started on IVF per EICU.


6/24 trending improved





(9) On mechanically assisted ventilation


Status:  Acute


(10) At risk for stress ulcer


Status:  Acute


Assessment & Plan:  Pantoprazole





(11) Impaired nutrition


Status:  Acute


Assessment & Plan:  Tube feeds.





(12) Hypernatremia


Status:  Acute


Assessment & Plan:  Increase free water flushes to 35 cc with tube feeds.





(13) DVT prophylaxis


Status:  Acute


Assessment & Plan:  Enoxaparin





Critical Care


Critically Ill Patient











ARINA ROCK MD              Jun 26, 2021 10:57

## 2021-06-27 VITALS — SYSTOLIC BLOOD PRESSURE: 106 MMHG | DIASTOLIC BLOOD PRESSURE: 63 MMHG

## 2021-06-27 VITALS — DIASTOLIC BLOOD PRESSURE: 61 MMHG | SYSTOLIC BLOOD PRESSURE: 98 MMHG

## 2021-06-27 VITALS — SYSTOLIC BLOOD PRESSURE: 144 MMHG | DIASTOLIC BLOOD PRESSURE: 63 MMHG

## 2021-06-27 VITALS — DIASTOLIC BLOOD PRESSURE: 70 MMHG | SYSTOLIC BLOOD PRESSURE: 134 MMHG

## 2021-06-27 VITALS — SYSTOLIC BLOOD PRESSURE: 133 MMHG | DIASTOLIC BLOOD PRESSURE: 66 MMHG

## 2021-06-27 VITALS — SYSTOLIC BLOOD PRESSURE: 122 MMHG | DIASTOLIC BLOOD PRESSURE: 68 MMHG

## 2021-06-27 VITALS — SYSTOLIC BLOOD PRESSURE: 113 MMHG | DIASTOLIC BLOOD PRESSURE: 59 MMHG

## 2021-06-27 VITALS — SYSTOLIC BLOOD PRESSURE: 150 MMHG | DIASTOLIC BLOOD PRESSURE: 66 MMHG

## 2021-06-27 VITALS — SYSTOLIC BLOOD PRESSURE: 117 MMHG | DIASTOLIC BLOOD PRESSURE: 67 MMHG

## 2021-06-27 VITALS — DIASTOLIC BLOOD PRESSURE: 58 MMHG | SYSTOLIC BLOOD PRESSURE: 120 MMHG

## 2021-06-27 VITALS — DIASTOLIC BLOOD PRESSURE: 63 MMHG | SYSTOLIC BLOOD PRESSURE: 102 MMHG

## 2021-06-27 VITALS — SYSTOLIC BLOOD PRESSURE: 97 MMHG | DIASTOLIC BLOOD PRESSURE: 45 MMHG

## 2021-06-27 VITALS — DIASTOLIC BLOOD PRESSURE: 63 MMHG | SYSTOLIC BLOOD PRESSURE: 114 MMHG

## 2021-06-27 VITALS — DIASTOLIC BLOOD PRESSURE: 65 MMHG | SYSTOLIC BLOOD PRESSURE: 132 MMHG

## 2021-06-27 VITALS — DIASTOLIC BLOOD PRESSURE: 59 MMHG | SYSTOLIC BLOOD PRESSURE: 108 MMHG

## 2021-06-27 VITALS — DIASTOLIC BLOOD PRESSURE: 60 MMHG | SYSTOLIC BLOOD PRESSURE: 111 MMHG

## 2021-06-27 VITALS — SYSTOLIC BLOOD PRESSURE: 97 MMHG | DIASTOLIC BLOOD PRESSURE: 60 MMHG

## 2021-06-27 VITALS — SYSTOLIC BLOOD PRESSURE: 112 MMHG | DIASTOLIC BLOOD PRESSURE: 57 MMHG

## 2021-06-27 VITALS — DIASTOLIC BLOOD PRESSURE: 59 MMHG | SYSTOLIC BLOOD PRESSURE: 103 MMHG

## 2021-06-27 VITALS — SYSTOLIC BLOOD PRESSURE: 146 MMHG | DIASTOLIC BLOOD PRESSURE: 64 MMHG

## 2021-06-27 VITALS — DIASTOLIC BLOOD PRESSURE: 55 MMHG | SYSTOLIC BLOOD PRESSURE: 106 MMHG

## 2021-06-27 VITALS — SYSTOLIC BLOOD PRESSURE: 148 MMHG | DIASTOLIC BLOOD PRESSURE: 66 MMHG

## 2021-06-27 VITALS — SYSTOLIC BLOOD PRESSURE: 128 MMHG | DIASTOLIC BLOOD PRESSURE: 65 MMHG

## 2021-06-27 VITALS — DIASTOLIC BLOOD PRESSURE: 48 MMHG | SYSTOLIC BLOOD PRESSURE: 108 MMHG

## 2021-06-27 VITALS — DIASTOLIC BLOOD PRESSURE: 52 MMHG | SYSTOLIC BLOOD PRESSURE: 102 MMHG

## 2021-06-27 VITALS — DIASTOLIC BLOOD PRESSURE: 65 MMHG | SYSTOLIC BLOOD PRESSURE: 112 MMHG

## 2021-06-27 VITALS — DIASTOLIC BLOOD PRESSURE: 62 MMHG | SYSTOLIC BLOOD PRESSURE: 106 MMHG

## 2021-06-27 VITALS — DIASTOLIC BLOOD PRESSURE: 65 MMHG | SYSTOLIC BLOOD PRESSURE: 113 MMHG

## 2021-06-27 VITALS — DIASTOLIC BLOOD PRESSURE: 64 MMHG | SYSTOLIC BLOOD PRESSURE: 124 MMHG

## 2021-06-27 VITALS — DIASTOLIC BLOOD PRESSURE: 57 MMHG | SYSTOLIC BLOOD PRESSURE: 115 MMHG

## 2021-06-27 LAB
ARTERIAL PATENCY WRIST A: (no result)
ARTERIAL PATENCY WRIST A: (no result)
BASE EXCESS STD BLDA CALC-SCNC: -0.9 MMOL/L (ref -2.5–2.5)
BASE EXCESS STD BLDA CALC-SCNC: -2.2 MMOL/L (ref -2.5–2.5)
BASOPHILS # BLD AUTO: 0.1 10^3/UL (ref 0–0.1)
BASOPHILS NFR BLD AUTO: 0 % (ref 0–10)
BDY SITE: (no result)
BDY SITE: (no result)
BODY TEMPERATURE: 37.8
BODY TEMPERATURE: 38.5
BUN/CREAT SERPL: 18
CALCIUM SERPL-MCNC: 7.8 MG/DL (ref 8.5–10.1)
CHLORIDE SERPL-SCNC: 113 MMOL/L (ref 98–107)
CO2 BLDA CALC-SCNC: 23.3 MMOL/L (ref 21–31)
CO2 BLDA CALC-SCNC: 23.6 MMOL/L (ref 21–31)
CO2 SERPL-SCNC: 20 MMOL/L (ref 21–32)
CREAT SERPL-MCNC: 0.89 MG/DL (ref 0.6–1.3)
EOSINOPHIL # BLD AUTO: 0.2 10^3/UL (ref 0–0.3)
EOSINOPHIL NFR BLD AUTO: 2 % (ref 0–10)
GFR SERPLBLD BASED ON 1.73 SQ M-ARVRAT: > 60 ML/MIN
GLUCOSE SERPL-MCNC: 266 MG/DL (ref 70–105)
HCT VFR BLD CALC: 36 % (ref 40–54)
HGB BLD-MCNC: 11.7 G/DL (ref 13.3–17.7)
INHALED O2 FLOW RATE: (no result) L/MIN
LYMPHOCYTES # BLD AUTO: 1.3 10^3/UL (ref 1–4)
LYMPHOCYTES NFR BLD AUTO: 8 % (ref 12–44)
MAGNESIUM SERPL-MCNC: 2.1 MG/DL (ref 1.6–2.4)
MANUAL DIFFERENTIAL PERFORMED BLD QL: NO
MCH RBC QN AUTO: 28 PG (ref 25–34)
MCHC RBC AUTO-ENTMCNC: 33 G/DL (ref 32–36)
MCV RBC AUTO: 86 FL (ref 80–99)
MONOCYTES # BLD AUTO: 0.5 10^3/UL (ref 0–1)
MONOCYTES NFR BLD AUTO: 3 % (ref 0–12)
NEUTROPHILS # BLD AUTO: 12.2 10^3/UL (ref 1.8–7.8)
NEUTROPHILS NFR BLD AUTO: 77 % (ref 42–75)
PCO2 BLDA: 35 MMHG (ref 35–45)
PCO2 BLDA: 39 MMHG (ref 35–45)
PH BLDA: 7.37 [PH] (ref 7.37–7.43)
PH BLDA: 7.43 [PH] (ref 7.37–7.43)
PHOSPHATE SERPL-MCNC: 2.2 MG/DL (ref 2.3–4.7)
PLATELET # BLD: 501 10^3/UL (ref 130–400)
PMV BLD AUTO: 9.9 FL (ref 9–12.2)
PO2 BLDA: 56 MMHG (ref 79–93)
PO2 BLDA: 78 MMHG (ref 79–93)
POTASSIUM SERPL-SCNC: 4.2 MMOL/L (ref 3.6–5)
SAO2 % BLDA FROM PO2: 86 % (ref 94–100)
SAO2 % BLDA FROM PO2: 95 % (ref 94–100)
SODIUM SERPL-SCNC: 144 MMOL/L (ref 135–145)
TRIGL SERPL-MCNC: 286 MG/DL (ref ?–150)
VENTILATION MODE VENT: YES
VENTILATION MODE VENT: YES
WBC # BLD AUTO: 15.8 10^3/UL (ref 4.3–11)

## 2021-06-27 RX ADMIN — ALBUTEROL SULFATE SCH GM: 90 AEROSOL, METERED RESPIRATORY (INHALATION) at 10:21

## 2021-06-27 RX ADMIN — IBUPROFEN PRN MG: 600 TABLET ORAL at 02:59

## 2021-06-27 RX ADMIN — PROPOFOL SCH MLS/HR: 10 INJECTION, EMULSION INTRAVENOUS at 11:52

## 2021-06-27 RX ADMIN — POTASSIUM CHLORIDE SCH MEQ: 1500 TABLET, EXTENDED RELEASE ORAL at 03:29

## 2021-06-27 RX ADMIN — ACETAMINOPHEN PRN MG: 325 TABLET ORAL at 17:32

## 2021-06-27 RX ADMIN — Medication SCH MLS/HR: at 16:31

## 2021-06-27 RX ADMIN — SODIUM CHLORIDE SCH MLS/HR: 4.5 INJECTION, SOLUTION INTRAVENOUS at 22:37

## 2021-06-27 RX ADMIN — DOCUSATE SODIUM AND SENNOSIDES SCH EA: 8.6; 5 TABLET, FILM COATED ORAL at 08:41

## 2021-06-27 RX ADMIN — ENOXAPARIN SODIUM SCH MG: 100 INJECTION SUBCUTANEOUS at 08:41

## 2021-06-27 RX ADMIN — ALBUTEROL SULFATE SCH GM: 90 AEROSOL, METERED RESPIRATORY (INHALATION) at 18:26

## 2021-06-27 RX ADMIN — VANCOMYCIN HYDROCHLORIDE SCH MLS/HR: 500 INJECTION, POWDER, LYOPHILIZED, FOR SOLUTION INTRAVENOUS at 17:22

## 2021-06-27 RX ADMIN — ACETAMINOPHEN PRN MG: 325 TABLET ORAL at 21:24

## 2021-06-27 RX ADMIN — PROPOFOL SCH MLS/HR: 10 INJECTION, EMULSION INTRAVENOUS at 17:12

## 2021-06-27 RX ADMIN — Medication SCH MLS/HR: at 15:48

## 2021-06-27 RX ADMIN — PROPOFOL SCH MLS/HR: 10 INJECTION, EMULSION INTRAVENOUS at 00:07

## 2021-06-27 RX ADMIN — ALBUTEROL SULFATE SCH GM: 90 AEROSOL, METERED RESPIRATORY (INHALATION) at 06:56

## 2021-06-27 RX ADMIN — DEXTROSE MONOHYDRATE SCH MLS/HR: 5 INJECTION, SOLUTION INTRAVENOUS at 03:22

## 2021-06-27 RX ADMIN — PROPOFOL SCH MLS/HR: 10 INJECTION, EMULSION INTRAVENOUS at 23:00

## 2021-06-27 RX ADMIN — Medication SCH MLS/HR: at 03:04

## 2021-06-27 RX ADMIN — PROPOFOL SCH MLS/HR: 10 INJECTION, EMULSION INTRAVENOUS at 00:08

## 2021-06-27 RX ADMIN — ALBUTEROL SULFATE SCH GM: 90 AEROSOL, METERED RESPIRATORY (INHALATION) at 02:16

## 2021-06-27 RX ADMIN — ACETAMINOPHEN PRN MG: 325 TABLET ORAL at 03:21

## 2021-06-27 RX ADMIN — PROPOFOL SCH MLS/HR: 10 INJECTION, EMULSION INTRAVENOUS at 17:11

## 2021-06-27 RX ADMIN — ALBUTEROL SULFATE SCH GM: 90 AEROSOL, METERED RESPIRATORY (INHALATION) at 17:24

## 2021-06-27 RX ADMIN — PROPOFOL SCH MLS/HR: 10 INJECTION, EMULSION INTRAVENOUS at 05:24

## 2021-06-27 RX ADMIN — IBUPROFEN PRN MG: 600 TABLET ORAL at 20:35

## 2021-06-27 RX ADMIN — PANTOPRAZOLE SODIUM SCH MG: 40 INJECTION, POWDER, FOR SOLUTION INTRAVENOUS at 08:41

## 2021-06-27 RX ADMIN — INSULIN ASPART SCH UNIT: 100 INJECTION, SOLUTION INTRAVENOUS; SUBCUTANEOUS at 23:03

## 2021-06-27 RX ADMIN — SIMVASTATIN SCH MG: 10 TABLET, FILM COATED ORAL at 19:26

## 2021-06-27 RX ADMIN — PROPOFOL SCH MLS/HR: 10 INJECTION, EMULSION INTRAVENOUS at 23:01

## 2021-06-27 RX ADMIN — SODIUM CHLORIDE SCH MLS/HR: 4.5 INJECTION, SOLUTION INTRAVENOUS at 10:02

## 2021-06-27 RX ADMIN — PROPOFOL SCH MLS/HR: 10 INJECTION, EMULSION INTRAVENOUS at 05:25

## 2021-06-27 RX ADMIN — INSULIN ASPART SCH UNIT: 100 INJECTION, SOLUTION INTRAVENOUS; SUBCUTANEOUS at 11:53

## 2021-06-27 RX ADMIN — Medication SCH MLS/HR: at 05:20

## 2021-06-27 RX ADMIN — Medication SCH MLS/HR: at 21:24

## 2021-06-27 RX ADMIN — INSULIN ASPART SCH UNIT: 100 INJECTION, SOLUTION INTRAVENOUS; SUBCUTANEOUS at 05:19

## 2021-06-27 RX ADMIN — FLUTICASONE PROPIONATE AND SALMETEROL SCH EACH: 232; 14 POWDER, METERED RESPIRATORY (INHALATION) at 06:57

## 2021-06-27 RX ADMIN — MAGNESIUM SULFATE IN DEXTROSE SCH MLS/HR: 10 INJECTION, SOLUTION INTRAVENOUS at 03:29

## 2021-06-27 RX ADMIN — ALBUTEROL SULFATE SCH GM: 90 AEROSOL, METERED RESPIRATORY (INHALATION) at 21:40

## 2021-06-27 RX ADMIN — DOCUSATE SODIUM AND SENNOSIDES SCH EA: 8.6; 5 TABLET, FILM COATED ORAL at 19:26

## 2021-06-27 RX ADMIN — MINERAL OIL AND WHITE PETROLATUM SCH GM: 150; 830 OINTMENT OPHTHALMIC at 19:26

## 2021-06-27 RX ADMIN — Medication SCH MLS/HR: at 11:01

## 2021-06-27 RX ADMIN — POTASSIUM CHLORIDE SCH MLS/HR: 200 INJECTION, SOLUTION INTRAVENOUS at 03:29

## 2021-06-27 RX ADMIN — ENOXAPARIN SODIUM SCH MG: 100 INJECTION SUBCUTANEOUS at 19:26

## 2021-06-27 RX ADMIN — Medication SCH MLS/HR: at 16:29

## 2021-06-27 RX ADMIN — FLUTICASONE PROPIONATE AND SALMETEROL SCH EACH: 232; 14 POWDER, METERED RESPIRATORY (INHALATION) at 18:26

## 2021-06-27 RX ADMIN — INSULIN ASPART SCH UNIT: 100 INJECTION, SOLUTION INTRAVENOUS; SUBCUTANEOUS at 17:22

## 2021-06-27 NOTE — PULMONARY PROGRESS NOTE
Subjective


Date Seen by a Provider:  Jun 27, 2021


Time Seen by a Provider:  07:22


Subjective/Events-last exam


Pt desaturating. Camered into room and D/W RN Estefania. Pt proned and PEEP 

increased to 18.


Review of Systems


See freetext note





Sepsis Event


Evaluation


Sepsis Stage:  Ruled Out


Possible Source:  Other


Height, Weight, BMI


Height: 5'11"


Weight: 305lbs. oz. 138.409617nj; 39.92 BMI


Method:Stated





Bedside Monitoring


CVP Measures:  8-12


ScvO2 measures:  Greater than 70%


Bedside Ultrasound Performed:  No


Passive Leg Raise/Fluid Bolus:  Fluid Responsive





Focused Exam


Sepsis Stage:  Ruled Out


Possible Source:  Other


Lactate Level


6/26/21 16:03: Lactic Acid Level 1.27


Respiratory:  Lungs Clear


Cardiovascular:  Regular Rate, Rhythm


Peripheral Pulses:  2+ Dorsalis Pedis (R), 2+ Left Dors-Pedis (L)


Skin:  normal color


Within 3hrs of presentation:  Lactate level





Exam


Exam


Patient acknowledged, consented, and participated in this virtual visit which 

was conducted using real time audio/video


Vital Signs








  Date Time  Temp Pulse Resp B/P (MAP) Pulse Ox O2 Delivery O2 Flow Rate FiO2


 


6/27/21 06:00 38.2 70 30 115/57 (76) 88 Mechanical Ventilator 100.00 


 


6/27/21 05:25  75  115/54    


 


6/27/21 05:24  75  115/54    


 


6/27/21 05:00 38.3 82 30 97/45 (62) 84 Mechanical Ventilator 100.00 


 


6/27/21 04:19 38.9       


 


6/27/21 04:00 39.1 89 32 108/48 (68) 100 Mechanical Ventilator 100.00 


 


6/27/21 03:22  96 34 166/65    


 


6/27/21 03:21 38.9       


 


6/27/21 03:11      Mechanical Ventilator 100.00 


 


6/27/21 03:05     95 Mechanical Ventilator  90


 


6/27/21 03:00 38.6 93 35 144/63 (90) 94 Mechanical Ventilator 90.00 


 


6/27/21 02:59 38.6       


 


6/27/21 02:57 38.6       


 


6/27/21 02:08  81 29  96   90


 


6/27/21 02:00 37.6 80 29 122/68 (86) 96 Mechanical Ventilator 90.00 


 


6/27/21 01:00 37.4 79 30 134/70 (91) 95 Mechanical Ventilator 90.00 


 


6/27/21 01:00  79      


 


6/27/21 00:08  65  109/58    


 


6/27/21 00:07  65  109/58    


 


6/27/21 00:00 37.2 64 26 103/59 (74) 93 Mechanical Ventilator 90.00 


 


6/26/21 23:00     92 Mechanical Ventilator  90


 


6/26/21 23:00 37.2 64 27 100/57 (71) 92 Mechanical Ventilator 90.00 


 


6/26/21 22:18  63 26  93   90


 


6/26/21 22:00 37.2 61 26 119/58 (78) 93 Mechanical Ventilator 90.00 


 


6/26/21 21:00 37.1 63 26 107/57 (74) 93 Mechanical Ventilator 90.00 


 


6/26/21 20:00 37.2 61 26 123/56 (78) 92 Mechanical Ventilator 90.00 


 


6/26/21 19:30     93 Mechanical Ventilator  90


 


6/26/21 19:00 37.2 62 26 122/58 (79) 93 Mechanical Ventilator 90.00 


 


6/26/21 19:00  62      


 


6/26/21 18:22  60 26  93   90


 


6/26/21 18:18  61  136/63    


 


6/26/21 18:17  61  136/63    


 


6/26/21 18:00 37.3 60 26 126/58 (80) 92 Mechanical Ventilator 100.00 


 


6/26/21 17:00 37.3 60 26 107/57 (74) 92 Mechanical Ventilator 100.00 


 


6/26/21 16:00     93 Mechanical Ventilator  90


 


6/26/21 16:00 37.3 61 26 125/59 (81) 93 Mechanical Ventilator 100.00 


 


6/26/21 15:00 37.4 62 26 113/59 (77) 92 Mechanical Ventilator 100.00 


 


6/26/21 14:23  63 26  93   100


 


6/26/21 14:00 37.4 69 26 96/59 (71) 92 Mechanical Ventilator 100.00 


 


6/26/21 13:00 37.5 84 31 120/71 (87) 93 Mechanical Ventilator 100.00 


 


6/26/21 12:50  64      


 


6/26/21 12:40  75  137/69    


 


6/26/21 12:39  75  137/69    


 


6/26/21 12:38  75  137/69    


 


6/26/21 12:00 37.4 63 29 129/60 (83) 89 Mechanical Ventilator 100.00 


 


6/26/21 11:15     88 Mechanical Ventilator  100


 


6/26/21 11:09  68  83/53    


 


6/26/21 11:05  68  117/58    


 


6/26/21 11:00 37.7 68 31 129/61 (83) 89 Mechanical Ventilator 100.00 


 


6/26/21 10:38  70 31  85   100


 


6/26/21 10:23 38.1       


 


6/26/21 10:00 38.3 70 26 98/59 (72) 91 Mechanical Ventilator 100.00 


 


6/26/21 09:53 38.3       


 


6/26/21 09:41  70 27 103/58    


 


6/26/21 09:00 38.3 70 28 107/58 (74) 91 Mechanical Ventilator 100.00 


 


6/26/21 08:43  73  153/69    


 


6/26/21 08:30  74  83/47    


 


6/26/21 08:25  73  78/42    


 


6/26/21 08:00 38.3 74 28 108/45 (66) 91 Mechanical Ventilator 100.00 


 


6/26/21 08:00     88 Mechanical Ventilator  100














I & O 


 


 6/27/21





 07:00


 


Intake Total 4720 ml


 


Output Total 1675 ml


 


Balance 3045 ml








Height & Weight


Height: 5'11"


Weight: 305lbs. oz. 138.265324ly; 39.92 BMI


Method:Stated


General Appearance:  No Apparent Distress, Chronically ill, Obese


HEENT:  PERRL/EOMI, Normal ENT Inspection, Pharynx Normal, Moist Mucous 

Membranes


Neck:  Full Range of Motion, Normal Inspection, Non Tender


Respiratory:  No Accessory Muscle Use, No Respiratory Distress, Other


Cardiovascular:  Regular Rate, Rhythm, No Murmur


Capillary Refill:  Less Than 3 Seconds


Peripheral Pulses:  2+ Dorsalis Pedis (R), 2+ Left Dors-Pedis (L)


Gastrointestinal:  normal bowel sounds, non tender, soft


Extremity:  Other (2+ edema upper extremities 1+ lower no purpura or rash.)


Neurologic/Psychiatric:  Alert, Oriented x3, No Motor/Sensory Deficits, Normal 

Mood/Affect


Skin:  Normal Color, Warm/Dry


Lymphatic:  No Adenopathy





Results


Lab


Laboratory Tests


6/26/21 04:00








6/27/21 02:50











Assessment/Plan


Assessment/Plan


See freetext note


Time spent with patient (mins):  15


Advance Care discuss with:  patient


Time spent on discussion(mins):  5











LESTER ROMERO MD          Jun 27, 2021 07:24

## 2021-06-27 NOTE — PULMONARY PROGRESS NOTE
Subjective


Date Seen by a Provider:  Jun 27, 2021


Time Seen by a Provider:  09:00


Subjective/Events-last exam


Rounded virtually with JOAQUINA Mac. 


Patient less stable overnight. VSS.  Most recent ABG on 1.0/+18 is 7.37/39/78. 

Today's plan is to monitor in ICU, continue present management, high FiO2 and 

peep, proning PRN..


Review of Systems


See freetext note





Sepsis Event


Evaluation


Sepsis Stage:  Ruled Out


Possible Source:  Other


Height, Weight, BMI


Height: 5'11"


Weight: 305lbs. oz. 138.370338aj; 39.92 BMI


Method:Stated





Bedside Monitoring


CVP Measures:  8-12


ScvO2 measures:  Greater than 70%


Bedside Ultrasound Performed:  No


Passive Leg Raise/Fluid Bolus:  Fluid Responsive





Focused Exam


Sepsis Stage:  Ruled Out


Possible Source:  Other


Lactate Level


6/26/21 16:03: Lactic Acid Level 1.27


Respiratory:  Lungs Clear


Cardiovascular:  No Edema


Capillary Refill:  Less Than 3 Seconds


Peripheral Pulses:  2+ Dorsalis Pedis (R), 2+ Left Dors-Pedis (L)


Skin:  normal color


Lactic Acid Level


See freetext note


Within 3hrs of presentation:  Lactate level





Exam


Exam


Patient acknowledged, consented, and participated in this virtual visit which 

was conducted using real time audio/video


Vital Signs








  Date Time  Temp Pulse Resp B/P (MAP) Pulse Ox O2 Delivery O2 Flow Rate FiO2


 


6/27/21 08:40     92 Mechanical Ventilator  100


 


6/27/21 08:00 37.9 74 20 112/57 (75) 90 Mechanical Ventilator 100.00 


 


6/27/21 07:23  82 30  87   100


 


6/27/21 07:00 38.0 70 29 133/66 (88) 87 Mechanical Ventilator 100.00 


 


6/27/21 07:00  69      


 


6/27/21 06:57  81 30  82   100


 


6/27/21 06:00 38.2 70 30 115/57 (76) 88 Mechanical Ventilator 100.00 


 


6/27/21 05:25  75  115/54    


 


6/27/21 05:24  75  115/54    


 


6/27/21 05:00 38.3 82 30 97/45 (62) 84 Mechanical Ventilator 100.00 


 


6/27/21 04:19 38.9       


 


6/27/21 04:00 39.1 89 32 108/48 (68) 100 Mechanical Ventilator 100.00 


 


6/27/21 03:22  96 34 166/65    


 


6/27/21 03:21 38.9       


 


6/27/21 03:11      Mechanical Ventilator 100.00 


 


6/27/21 03:05     95 Mechanical Ventilator  90


 


6/27/21 03:00 38.6 93 35 144/63 (90) 94 Mechanical Ventilator 90.00 


 


6/27/21 02:59 38.6       


 


6/27/21 02:57 38.6       


 


6/27/21 02:08  81 29  96   90


 


6/27/21 02:00 37.6 80 29 122/68 (86) 96 Mechanical Ventilator 90.00 


 


6/27/21 01:00 37.4 79 30 134/70 (91) 95 Mechanical Ventilator 90.00 


 


6/27/21 01:00  79      


 


6/27/21 00:08  65  109/58    


 


6/27/21 00:07  65  109/58    


 


6/27/21 00:00 37.2 64 26 103/59 (74) 93 Mechanical Ventilator 90.00 


 


6/26/21 23:00     92 Mechanical Ventilator  90


 


6/26/21 23:00 37.2 64 27 100/57 (71) 92 Mechanical Ventilator 90.00 


 


6/26/21 22:18  63 26  93   90


 


6/26/21 22:00 37.2 61 26 119/58 (78) 93 Mechanical Ventilator 90.00 


 


6/26/21 21:00 37.1 63 26 107/57 (74) 93 Mechanical Ventilator 90.00 


 


6/26/21 20:00 37.2 61 26 123/56 (78) 92 Mechanical Ventilator 90.00 


 


6/26/21 19:30     93 Mechanical Ventilator  90


 


6/26/21 19:00 37.2 62 26 122/58 (79) 93 Mechanical Ventilator 90.00 


 


6/26/21 19:00  62      


 


6/26/21 18:22  60 26  93   90


 


6/26/21 18:18  61  136/63    


 


6/26/21 18:17  61  136/63    


 


6/26/21 18:00 37.3 60 26 126/58 (80) 92 Mechanical Ventilator 100.00 


 


6/26/21 17:00 37.3 60 26 107/57 (74) 92 Mechanical Ventilator 100.00 


 


6/26/21 16:00     93 Mechanical Ventilator  90


 


6/26/21 16:00 37.3 61 26 125/59 (81) 93 Mechanical Ventilator 100.00 


 


6/26/21 15:00 37.4 62 26 113/59 (77) 92 Mechanical Ventilator 100.00 


 


6/26/21 14:23  63 26  93   100


 


6/26/21 14:00 37.4 69 26 96/59 (71) 92 Mechanical Ventilator 100.00 


 


6/26/21 13:00 37.5 84 31 120/71 (87) 93 Mechanical Ventilator 100.00 


 


6/26/21 12:50  64      


 


6/26/21 12:40  75  137/69    


 


6/26/21 12:39  75  137/69    


 


6/26/21 12:38  75  137/69    


 


6/26/21 12:00 37.4 63 29 129/60 (83) 89 Mechanical Ventilator 100.00 


 


6/26/21 11:15     88 Mechanical Ventilator  100


 


6/26/21 11:09  68  83/53    


 


6/26/21 11:05  68  117/58    


 


6/26/21 11:00 37.7 68 31 129/61 (83) 89 Mechanical Ventilator 100.00 


 


6/26/21 10:38  70 31  85   100


 


6/26/21 10:23 38.1       


 


6/26/21 10:00 38.3 70 26 98/59 (72) 91 Mechanical Ventilator 100.00 


 


6/26/21 09:53 38.3       


 


6/26/21 09:41  70 27 103/58    














I & O 


 


 6/27/21





 07:00


 


Intake Total 4720 ml


 


Output Total 1675 ml


 


Balance 3045 ml








Height & Weight


Height: 5'11"


Weight: 305lbs. oz. 138.456962aw; 39.92 BMI


Method:Stated


General Appearance:  No Apparent Distress, Chronically ill, Obese


HEENT:  PERRL/EOMI, Normal ENT Inspection, Pharynx Normal, Moist Mucous 

Membranes


Neck:  Full Range of Motion, Normal Inspection, Non Tender


Respiratory:  No Accessory Muscle Use, No Respiratory Distress, Other


Cardiovascular:  Regular Rate, Rhythm, No Murmur


Capillary Refill:  Less Than 3 Seconds


Peripheral Pulses:  2+ Left Dors-Pedis (L), 2+ Radial Pulses (R)


Gastrointestinal:  normal bowel sounds, non tender, soft


Extremity:  Other (2+ edema upper extremities 1+ lower no purpura or rash.)


Neurologic/Psychiatric:  Alert, Oriented x3, No Motor/Sensory Deficits, Normal 

Mood/Affect


Skin:  Normal Color, Warm/Dry


Lymphatic:  No Adenopathy


Other comments


See freetext note





Results


Lab


Laboratory Tests


6/26/21 04:00








6/27/21 02:50








See freetext note


Meds


See freetext note


Radiology


See freetext note





Procedures


See freetext note





Assessment/Plan


Assessment/Plan


See freetext note


Critical Care:  Ventilator Management


Time spent with patient (mins):  15


Advance Care discuss with:  patient


Time spent on discussion(mins):  5





Diagnosis/Problems


Diagnosis/Problems





(1) On mechanically assisted ventilation


Status:  Acute











LESTER ROMERO MD          Jun 27, 2021 09:15

## 2021-06-27 NOTE — PROGRESS NOTE - HOSPITALIST
Subjective


HPI/CC On Admission


Date Seen by Provider:  Jun 27, 2021


Time Seen by Provider:  09:15


Chief complaint: Shortness of breath due to Covid





History of present illness: This is a 40-year-old white male diabetic noncom

pliant with diabetic medication who does not use oxygen or CPAP machine but 

appears to be high risk for CPAP due to neck circumference and BMI of 40 who 

presented to the ER with shortness of breath was diagnosed with Covid and he is 

hypoxic.  Patient has progressed throughout the day and at 2100 hrs. the 

decision was made to transfer up to the ICU for pulmonary critical care 

management due to maxed on Vapotherm likely will need BiPAP and possible 

intubation.  His blood sugars remain in the 300s with aggressive insulin regimen

trying to decrease the level.  He does not currently smoke or drink alcohol any 

works for [employer].  He has not been vaccinated against COVID-19.


Subjective/Events-last exam


Patient sedated on vent requiring prone status and increase in PEEP to 18 due to

progressing respiratory failure.





Focused Exam


Lactate Level


6/26/21 16:03: Lactic Acid Level 1.27








Objective


Exam


Vital Signs





Vital Signs








  Date Time  Temp Pulse Resp B/P (MAP) Pulse Ox O2 Delivery O2 Flow Rate FiO2


 


6/27/21 12:00 37.4 65 26 148/66 (93) 93 Mechanical Ventilator 100.00 


 


6/27/21 10:21        100





Capillary Refill : Less Than 3 Seconds


General Appearance:  Obese, Other (Due to sedation on mechanical ventilation 

does not appear to be in distress.)


Respiratory:  No Accessory Muscle Use, Other (Scattered rales and rhonchi 

unchanged from yesterday.  No wheezing noted. )


Cardiovascular:  Regular Rate, Rhythm, No Murmur, Tachycardia


Extremity:  Other (1-2+ edema of the lower extremities 2+ of the upper 

extremities)





Results/Procedures


Lab


Laboratory Tests


6/27/21 02:50








Patient resulted labs reviewed.





Assessment/Plan


Assessment and Plan


Assess & Plan/Chief Complaint


(1) COVID-19


Status:  Acute


Assessment & Plan:  s/p convalescent plasma, receiving dexamethasone. D/C 

remdesevir due to significant hypoxia/need for bipap. Appreciate Tele ICU 

recommendations. D dimer minimally elevated.





(2) Acute respiratory failure


Status:  Acute


Assessment & Plan:  Secondary to COVID19, requiring bipap, appreciate tele ICU 

assistance. 


6/22 continued worsening, required intubation and mechanical ventilation 

starting this am, appreciate [facility] recommendations. 


6/23 difficulty managing hypoxia, remains at 100% FiO2, PEEP increased to 14 per

[facility] ICU orders, proning improves status but has difficulty tolerating the 

turn, had hypotension with turning last night.


6/24 improved oxygenation, appreciate EICU management.


6/26 no improvement in respiratory status or chest x-ray requiring 100% FiO2 and

12 of PEEP O2 saturations 89 to 90% predominantly.  No evidence for purulence in

suctioned secretions per staff.  White count elevation likely secondary to 

Decadron continue to monitor. Prognosis remains extremely poor.


6/27 progressive respiratory failure with O2 saturations supine down to the low 

80s on 12 of PEEP and 100% FiO2.  With change in prone positioning and PEEP to 

18 O2 saturations for an hour in the low 90s to upper 80s all indicative of 

progressive respiratory failure with extremely poor prognosis.  At this point 

putting the patient through CPR procedure would be the definition of futile and 

only lead to increase in infection transmission to staff so I have strongly 

advised against this.


Qualifiers:  


   Qualified Codes:  J96.01 - Acute respiratory failure with hypoxia


(3) Diabetes mellitus, type 2


Status:  Chronic


Assessment & Plan:  On levemir 25 units BID inpatient, holding home glipizide, 

pioglitazone and canagliflozin. Sliding scale insulin.


6/22 glucose all above 200, will increase levemir to 28 units BID.


6/25 glucose back to in general above 200, increase levemir to 30 units BID


Qualifiers:  


   Qualified Codes:  E11.65 - Type 2 diabetes mellitus with hyperglycemia


(4) Hypertension


Status:  Chronic


Assessment & Plan:  Hold home lisinopril/HCTZ with low normal BP


6/22 BP increased but not significantly hypertensive, monitor and resume 

antihypertensive as needed


Qualifiers:  


   Qualified Codes:  I10 - Essential (primary) hypertension


(5) Obesity (BMI 30-39.9)


Status:  Chronic


(6) Hypotension


Status:  Acute


Assessment & Plan:  Required norepinephrine overnight, resume as needed. 

Appreciate EICU recommendations.


6/24 intermittently requiring pressor





(7) Bradycardia


Status:  Acute


Assessment & Plan:  Precedex held, suspect partially related to COVID, remaining

in 50s after holding Precedex.





(8) Acute renal insufficiency


Status:  Acute


Assessment & Plan:  Started on IVF per EICU.


6/24 trending improved





(9) On mechanically assisted ventilation


Status:  Acute


(10) At risk for stress ulcer


Status:  Acute


Assessment & Plan:  Pantoprazole





(11) Impaired nutrition


Status:  Acute


Assessment & Plan:  Tube feeds.





(12) Hypernatremia


Status:  Acute


Assessment & Plan:  Increase free water flushes to 35 cc with tube feeds.





(13) DVT prophylaxis


Status:  Acute


Assessment & Plan:  Enoxaparin





Critical Care


Critically Ill Patient











ARINA ROCK MD              Jun 27, 2021 12:54

## 2021-06-27 NOTE — DIAGNOSTIC IMAGING REPORT
EXAMINATION: Chest radiograph, portable AP view.



DATE: 6/27/2021 4:00 AM



INDICATION: 40-year-old male, intubation.



COMPARISON:  June 26, 2021.



FINDINGS: The endotracheal tube is approximately 5.3 cm above the

agustin. The nasogastric tube is not well seen below the level of

the midesophagus. There are technical limitations of the exam

relating to patient body habitus and difficulties with exposure.

There is multifocal bilateral lung consolidation which is

essentially unchanged.



IMPRESSION: 

1. Unchanged multifocal bilateral lung consolidation.

2. The tip of the nasogastric tube is not well seen.



 



Dictated by: 



  Dictated on workstation # [id number]

## 2021-06-28 VITALS — DIASTOLIC BLOOD PRESSURE: 48 MMHG | SYSTOLIC BLOOD PRESSURE: 98 MMHG

## 2021-06-28 VITALS — DIASTOLIC BLOOD PRESSURE: 67 MMHG | SYSTOLIC BLOOD PRESSURE: 134 MMHG

## 2021-06-28 VITALS — SYSTOLIC BLOOD PRESSURE: 143 MMHG | DIASTOLIC BLOOD PRESSURE: 62 MMHG

## 2021-06-28 VITALS — SYSTOLIC BLOOD PRESSURE: 122 MMHG | DIASTOLIC BLOOD PRESSURE: 72 MMHG

## 2021-06-28 VITALS — DIASTOLIC BLOOD PRESSURE: 63 MMHG | SYSTOLIC BLOOD PRESSURE: 121 MMHG

## 2021-06-28 VITALS — DIASTOLIC BLOOD PRESSURE: 56 MMHG | SYSTOLIC BLOOD PRESSURE: 147 MMHG

## 2021-06-28 VITALS — DIASTOLIC BLOOD PRESSURE: 66 MMHG | SYSTOLIC BLOOD PRESSURE: 124 MMHG

## 2021-06-28 VITALS — DIASTOLIC BLOOD PRESSURE: 69 MMHG | SYSTOLIC BLOOD PRESSURE: 127 MMHG

## 2021-06-28 VITALS — SYSTOLIC BLOOD PRESSURE: 113 MMHG | DIASTOLIC BLOOD PRESSURE: 59 MMHG

## 2021-06-28 VITALS — DIASTOLIC BLOOD PRESSURE: 57 MMHG | SYSTOLIC BLOOD PRESSURE: 114 MMHG

## 2021-06-28 VITALS — DIASTOLIC BLOOD PRESSURE: 73 MMHG | SYSTOLIC BLOOD PRESSURE: 124 MMHG

## 2021-06-28 VITALS — SYSTOLIC BLOOD PRESSURE: 106 MMHG | DIASTOLIC BLOOD PRESSURE: 64 MMHG

## 2021-06-28 VITALS — DIASTOLIC BLOOD PRESSURE: 69 MMHG | SYSTOLIC BLOOD PRESSURE: 132 MMHG

## 2021-06-28 VITALS — DIASTOLIC BLOOD PRESSURE: 72 MMHG | SYSTOLIC BLOOD PRESSURE: 121 MMHG

## 2021-06-28 VITALS — DIASTOLIC BLOOD PRESSURE: 60 MMHG | SYSTOLIC BLOOD PRESSURE: 149 MMHG

## 2021-06-28 VITALS — SYSTOLIC BLOOD PRESSURE: 108 MMHG | DIASTOLIC BLOOD PRESSURE: 65 MMHG

## 2021-06-28 VITALS — SYSTOLIC BLOOD PRESSURE: 100 MMHG | DIASTOLIC BLOOD PRESSURE: 49 MMHG

## 2021-06-28 VITALS — SYSTOLIC BLOOD PRESSURE: 132 MMHG | DIASTOLIC BLOOD PRESSURE: 55 MMHG

## 2021-06-28 VITALS — DIASTOLIC BLOOD PRESSURE: 58 MMHG | SYSTOLIC BLOOD PRESSURE: 144 MMHG

## 2021-06-28 VITALS — DIASTOLIC BLOOD PRESSURE: 51 MMHG | SYSTOLIC BLOOD PRESSURE: 103 MMHG

## 2021-06-28 VITALS — DIASTOLIC BLOOD PRESSURE: 42 MMHG | SYSTOLIC BLOOD PRESSURE: 108 MMHG

## 2021-06-28 VITALS — DIASTOLIC BLOOD PRESSURE: 62 MMHG | SYSTOLIC BLOOD PRESSURE: 123 MMHG

## 2021-06-28 VITALS — SYSTOLIC BLOOD PRESSURE: 112 MMHG | DIASTOLIC BLOOD PRESSURE: 62 MMHG

## 2021-06-28 VITALS — DIASTOLIC BLOOD PRESSURE: 55 MMHG | SYSTOLIC BLOOD PRESSURE: 104 MMHG

## 2021-06-28 VITALS — SYSTOLIC BLOOD PRESSURE: 130 MMHG | DIASTOLIC BLOOD PRESSURE: 68 MMHG

## 2021-06-28 VITALS — SYSTOLIC BLOOD PRESSURE: 111 MMHG | DIASTOLIC BLOOD PRESSURE: 62 MMHG

## 2021-06-28 VITALS — DIASTOLIC BLOOD PRESSURE: 59 MMHG | SYSTOLIC BLOOD PRESSURE: 100 MMHG

## 2021-06-28 VITALS — SYSTOLIC BLOOD PRESSURE: 101 MMHG | DIASTOLIC BLOOD PRESSURE: 61 MMHG

## 2021-06-28 VITALS — DIASTOLIC BLOOD PRESSURE: 63 MMHG | SYSTOLIC BLOOD PRESSURE: 111 MMHG

## 2021-06-28 VITALS — SYSTOLIC BLOOD PRESSURE: 129 MMHG | DIASTOLIC BLOOD PRESSURE: 58 MMHG

## 2021-06-28 LAB
ARTERIAL PATENCY WRIST A: (no result)
BASE EXCESS STD BLDA CALC-SCNC: -1.1 MMOL/L (ref -2.5–2.5)
BASOPHILS # BLD AUTO: 0.1 10^3/UL (ref 0–0.1)
BASOPHILS NFR BLD AUTO: 0 % (ref 0–10)
BDY SITE: (no result)
BODY TEMPERATURE: 38.1
BUN/CREAT SERPL: 22
CALCIUM SERPL-MCNC: 7.7 MG/DL (ref 8.5–10.1)
CHLORIDE SERPL-SCNC: 113 MMOL/L (ref 98–107)
CO2 BLDA CALC-SCNC: 23.5 MMOL/L (ref 21–31)
CO2 SERPL-SCNC: 20 MMOL/L (ref 21–32)
CREAT SERPL-MCNC: 0.87 MG/DL (ref 0.6–1.3)
EOSINOPHIL # BLD AUTO: 0.3 10^3/UL (ref 0–0.3)
EOSINOPHIL NFR BLD AUTO: 1 % (ref 0–10)
GFR SERPLBLD BASED ON 1.73 SQ M-ARVRAT: > 60 ML/MIN
GLUCOSE SERPL-MCNC: 175 MG/DL (ref 70–105)
HCT VFR BLD CALC: 34 % (ref 40–54)
HGB BLD-MCNC: 11.4 G/DL (ref 13.3–17.7)
INHALED O2 FLOW RATE: (no result) L/MIN
LYMPHOCYTES # BLD AUTO: 1.6 10^3/UL (ref 1–4)
LYMPHOCYTES NFR BLD AUTO: 7 % (ref 12–44)
MAGNESIUM SERPL-MCNC: 2.1 MG/DL (ref 1.6–2.4)
MANUAL DIFFERENTIAL PERFORMED BLD QL: NO
MCH RBC QN AUTO: 28 PG (ref 25–34)
MCHC RBC AUTO-ENTMCNC: 33 G/DL (ref 32–36)
MCV RBC AUTO: 85 FL (ref 80–99)
MONOCYTES # BLD AUTO: 0.8 10^3/UL (ref 0–1)
MONOCYTES NFR BLD AUTO: 3 % (ref 0–12)
NEUTROPHILS # BLD AUTO: 18.6 10^3/UL (ref 1.8–7.8)
NEUTROPHILS NFR BLD AUTO: 81 % (ref 42–75)
PCO2 BLDA: 35 MMHG (ref 35–45)
PH BLDA: 7.42 [PH] (ref 7.37–7.43)
PHOSPHATE SERPL-MCNC: 2.7 MG/DL (ref 2.3–4.7)
PLATELET # BLD: 584 10^3/UL (ref 130–400)
PMV BLD AUTO: 9.7 FL (ref 9–12.2)
PO2 BLDA: 88 MMHG (ref 79–93)
POTASSIUM SERPL-SCNC: 4.1 MMOL/L (ref 3.6–5)
SAO2 % BLDA FROM PO2: 97 % (ref 94–100)
SODIUM SERPL-SCNC: 141 MMOL/L (ref 135–145)
VENTILATION MODE VENT: YES
WBC # BLD AUTO: 23.1 10^3/UL (ref 4.3–11)

## 2021-06-28 RX ADMIN — POTASSIUM CHLORIDE SCH MLS/HR: 200 INJECTION, SOLUTION INTRAVENOUS at 01:42

## 2021-06-28 RX ADMIN — INSULIN ASPART SCH UNIT: 100 INJECTION, SOLUTION INTRAVENOUS; SUBCUTANEOUS at 23:22

## 2021-06-28 RX ADMIN — PROPOFOL SCH MLS/HR: 10 INJECTION, EMULSION INTRAVENOUS at 10:22

## 2021-06-28 RX ADMIN — ALBUTEROL SULFATE SCH GM: 90 AEROSOL, METERED RESPIRATORY (INHALATION) at 14:00

## 2021-06-28 RX ADMIN — PROPOFOL SCH MLS/HR: 10 INJECTION, EMULSION INTRAVENOUS at 05:10

## 2021-06-28 RX ADMIN — INSULIN ASPART SCH UNIT: 100 INJECTION, SOLUTION INTRAVENOUS; SUBCUTANEOUS at 04:38

## 2021-06-28 RX ADMIN — ALBUTEROL SULFATE SCH GM: 90 AEROSOL, METERED RESPIRATORY (INHALATION) at 18:13

## 2021-06-28 RX ADMIN — PANTOPRAZOLE SODIUM SCH MG: 40 INJECTION, POWDER, FOR SOLUTION INTRAVENOUS at 08:54

## 2021-06-28 RX ADMIN — PROPOFOL SCH MLS/HR: 10 INJECTION, EMULSION INTRAVENOUS at 14:56

## 2021-06-28 RX ADMIN — SIMVASTATIN SCH MG: 10 TABLET, FILM COATED ORAL at 20:45

## 2021-06-28 RX ADMIN — PROPOFOL SCH MLS/HR: 10 INJECTION, EMULSION INTRAVENOUS at 18:41

## 2021-06-28 RX ADMIN — ACETAMINOPHEN PRN MG: 325 TABLET ORAL at 20:46

## 2021-06-28 RX ADMIN — FLUTICASONE PROPIONATE AND SALMETEROL SCH EACH: 232; 14 POWDER, METERED RESPIRATORY (INHALATION) at 18:13

## 2021-06-28 RX ADMIN — PROPOFOL SCH MLS/HR: 10 INJECTION, EMULSION INTRAVENOUS at 18:40

## 2021-06-28 RX ADMIN — VANCOMYCIN HYDROCHLORIDE SCH MLS/HR: 500 INJECTION, POWDER, LYOPHILIZED, FOR SOLUTION INTRAVENOUS at 01:07

## 2021-06-28 RX ADMIN — VANCOMYCIN HYDROCHLORIDE SCH MLS/HR: 500 INJECTION, POWDER, LYOPHILIZED, FOR SOLUTION INTRAVENOUS at 21:00

## 2021-06-28 RX ADMIN — ACETAMINOPHEN PRN MG: 325 TABLET ORAL at 01:07

## 2021-06-28 RX ADMIN — DOCUSATE SODIUM AND SENNOSIDES SCH EA: 8.6; 5 TABLET, FILM COATED ORAL at 08:54

## 2021-06-28 RX ADMIN — PROPOFOL SCH MLS/HR: 10 INJECTION, EMULSION INTRAVENOUS at 05:41

## 2021-06-28 RX ADMIN — VANCOMYCIN HYDROCHLORIDE SCH MLS/HR: 500 INJECTION, POWDER, LYOPHILIZED, FOR SOLUTION INTRAVENOUS at 10:21

## 2021-06-28 RX ADMIN — INSULIN ASPART SCH UNIT: 100 INJECTION, SOLUTION INTRAVENOUS; SUBCUTANEOUS at 17:42

## 2021-06-28 RX ADMIN — DOCUSATE SODIUM AND SENNOSIDES SCH EA: 8.6; 5 TABLET, FILM COATED ORAL at 20:45

## 2021-06-28 RX ADMIN — POTASSIUM CHLORIDE SCH MEQ: 1500 TABLET, EXTENDED RELEASE ORAL at 01:42

## 2021-06-28 RX ADMIN — MAGNESIUM SULFATE IN DEXTROSE SCH MLS/HR: 10 INJECTION, SOLUTION INTRAVENOUS at 01:42

## 2021-06-28 RX ADMIN — MINERAL OIL AND WHITE PETROLATUM SCH APPLIC: 150; 830 OINTMENT OPHTHALMIC at 08:55

## 2021-06-28 RX ADMIN — PROPOFOL SCH MLS/HR: 10 INJECTION, EMULSION INTRAVENOUS at 10:21

## 2021-06-28 RX ADMIN — ALBUTEROL SULFATE SCH GM: 90 AEROSOL, METERED RESPIRATORY (INHALATION) at 22:40

## 2021-06-28 RX ADMIN — ALBUTEROL SULFATE SCH GM: 90 AEROSOL, METERED RESPIRATORY (INHALATION) at 06:19

## 2021-06-28 RX ADMIN — ALBUTEROL SULFATE SCH GM: 90 AEROSOL, METERED RESPIRATORY (INHALATION) at 11:20

## 2021-06-28 RX ADMIN — ENOXAPARIN SODIUM SCH MG: 100 INJECTION SUBCUTANEOUS at 20:45

## 2021-06-28 RX ADMIN — SODIUM CHLORIDE SCH MLS/HR: 4.5 INJECTION, SOLUTION INTRAVENOUS at 01:07

## 2021-06-28 RX ADMIN — ALBUTEROL SULFATE SCH GM: 90 AEROSOL, METERED RESPIRATORY (INHALATION) at 02:07

## 2021-06-28 RX ADMIN — IBUPROFEN PRN MG: 600 TABLET ORAL at 17:33

## 2021-06-28 RX ADMIN — Medication SCH MLS/HR: at 18:39

## 2021-06-28 RX ADMIN — Medication SCH MLS/HR: at 23:29

## 2021-06-28 RX ADMIN — Medication SCH MLS/HR: at 14:57

## 2021-06-28 RX ADMIN — INSULIN ASPART SCH UNIT: 100 INJECTION, SOLUTION INTRAVENOUS; SUBCUTANEOUS at 12:05

## 2021-06-28 RX ADMIN — Medication SCH MLS/HR: at 05:22

## 2021-06-28 RX ADMIN — ENOXAPARIN SODIUM SCH MG: 100 INJECTION SUBCUTANEOUS at 08:54

## 2021-06-28 RX ADMIN — Medication SCH MLS/HR: at 02:18

## 2021-06-28 RX ADMIN — SODIUM CHLORIDE SCH MLS/HR: 4.5 INJECTION, SOLUTION INTRAVENOUS at 23:39

## 2021-06-28 RX ADMIN — Medication SCH MLS/HR: at 05:11

## 2021-06-28 RX ADMIN — MINERAL OIL AND WHITE PETROLATUM SCH APPLIC: 150; 830 OINTMENT OPHTHALMIC at 20:45

## 2021-06-28 RX ADMIN — Medication SCH MLS/HR: at 03:11

## 2021-06-28 RX ADMIN — FLUTICASONE PROPIONATE AND SALMETEROL SCH EACH: 232; 14 POWDER, METERED RESPIRATORY (INHALATION) at 14:00

## 2021-06-28 RX ADMIN — Medication SCH MLS/HR: at 10:21

## 2021-06-28 NOTE — DIAGNOSTIC IMAGING REPORT
Indication: Ventilator support.



Time of exam:  3:05 AM



Correlation is made with prior chest one day earlier.



ET tube has tip above the agustin. NG tube passes below the

diaphragm. Extensive bilateral pulmonary infiltrates persist but

do show some partial clearing with some mild improved aeration of

both lungs when compared with yesterday. No effusion or

pneumothorax is identified.



Impression: Bilateral pulmonary infiltrates showing mild

improvement when compared to examination one day earlier.



Dictated by: 



  Dictated on workstation # [id number]

## 2021-06-28 NOTE — PHYSICAL THERAPY PROGRESS NOTE
Therapy Progress Note


Patient intubated and sedated this a.m.  PT will continue to monitor patient 

status and initiate treatment when patient is able to actively participate with 

skilled therapy.











LORI SY PT              Jun 28, 2021 07:59

## 2021-06-28 NOTE — TELE-ICU PROGRESS NOTE
Left message for patient to return my call. No response after 3 attempts, will mail post card asking pt to call office for results. Subjective


Date Seen by a Provider:  2021


Time Seen by a Provider:  18:19





Sepsis Event


Evaluation


Height, Weight, BMI


Height: 5'11"


Weight: 305lbs. oz. 138.994204nl; 39.92 BMI


Method:Stated





Focused Exam


Lactate Level


21 16:03: Lactic Acid Level 1.27





Exam


Exam


Patient acknowledged, consented, and participated in this virtual visit which 

was conducted using real time audio/video


Vital Signs








  Date Time  Temp Pulse Resp B/P (MAP) Pulse Ox O2 Delivery O2 Flow Rate FiO2


 


21 18:13  77 28  88   100


 


21 18:00 39.7 97 35 100/49 (66) 87 Mechanical Ventilator 100.00 


 


21 17:00 39.5 88 30 149/60 (89) 86 Mechanical Ventilator 100.00 


 


21 16:00 38.9 96 37 147/56 (86) 88 Mechanical Ventilator 100.00 


 


21 15:06     90 Mechanical Ventilator  100


 


21 15:00 38.4 73 28 132/69 (90) 85 Mechanical Ventilator 100.00 


 


21 14:56    127/68    


 


21 14:01  70 28  91   100


 


21 14:00 38.1 70 26 121/72 (88) 91 Mechanical Ventilator 100.00 


 


21 13:00 37.9 70 26 124/73 (90) 90 Mechanical Ventilator 100.00 


 


21 13:00  71      


 


21 12:00 37.7 70 26 134/67 (89) 90 Mechanical Ventilator 100.00 


 


21 11:20  71 27  82   100


 


21 11:06     89 Mechanical Ventilator  100


 


21 11:00 37.6 70 28 130/68 (88) 90 Mechanical Ventilator 100.00 


 


21 10:22    110/64    


 


21 10:21    106/64    


 


21 10:00 37.5 69 28 124/66 (85) 95 Mechanical Ventilator 100.00 


 


21 09:00  70 31 111/63 (79) 92 Mechanical Ventilator 100.00 


 


21 08:00  69 27 121/63 (82) 93 Mechanical Ventilator 100.00 


 


21 08:00     91 Mechanical Ventilator  100


 


21 07:00 37.7 71 27 112/62 (79) 92 Mechanical Ventilator 100.00 


 


21 07:00  72      


 


21 06:20  72 29  91   100


 


21 06:00 38.0 73 29 104/55 (71) 94 Mechanical Ventilator 100.00 


 


21 05:41    112/52    


 


21 05:11    117/56    


 


21 05:10    117/56    


 


21 05:00 38.1 77 33 114/57 (76) 94 Mechanical Ventilator 100.00 


 


21 04:26    91/46    


 


21 04:12    95/44    


 


21 04:00 38.6 100  108/42 (64) 90 Mechanical Ventilator 100.00 


 


21 03:21     91 Mechanical Ventilator  100


 


21 03:00 38.8 95  143/62 (89) 87 Mechanical Ventilator 100.00 


 


21 02:27      Mechanical Ventilator 100.00 


 


21 02:26 38.7       


 


21 02:07  84 29  91   80


 


21 02:00 38.5 77 24 132/55 (80) 91 Mechanical Ventilator 80.00 


 


21 01:32 38.3       


 


21 01:19    148/75    


 


21 01:07 38.1       


 


21 01:00  69      


 


21 01:00 38.1 69 22 101/61 (74) 91 Mechanical Ventilator 80.00 


 


21 00:00 38.1 69 27 100/59 (73) 92 Mechanical Ventilator 80.00 


 


21 23:04     93 Mechanical Ventilator  80


 


21 23:01    103/62    


 


21 23:00 38.1 65 25 102/63 (76) 91 Mechanical Ventilator 80.00 


 


21 23:00    103/62    


 


21 22:30    87/48    


 


21 22:00 38.1 75 25 102/52 (69) 92 Mechanical Ventilator 80.00 


 


21 21:43 38.1       


 


21 21:41  76 28  93   80


 


21 21:24 38.1       


 


6/27/21 21:00 38.1 71 26 108/59 (75) 93 Mechanical Ventilator 80.00 


 


21 20:35 37.9       


 


21 20:00 37.7 68 26 98/61 (73) 93 Mechanical Ventilator 80.00 


 


21 19:32      Mechanical Ventilator 80.00 


 


21 19:32     93 Mechanical Ventilator  80


 


21 19:00  75      


 


21 19:00 37.7 75 24 111/60 (77) 92 Mechanical Ventilator 80.00 


 


21 18:28  83 28  92   80














I & O 


 


 21





 07:00


 


Intake Total 5260 ml


 


Output Total 1425 ml


 


Balance 3835 ml








Height & Weight


Height: 5'11"


Weight: 305lbs. oz. 138.265412bn; 39.92 BMI


Method:Stated


General Appearance:  Obese, Other (Due to sedation on mechanical ventilation 

does not appear to be in distress.)


HEENT:  PERRL/EOMI, Normal ENT Inspection, Pharynx Normal, Moist Mucous 

Membranes


Neck:  Full Range of Motion, Normal Inspection, Non Tender


Respiratory:  No Accessory Muscle Use, Other (Scattered rales and rhonchi 

unchanged from yesterday.  No wheezing noted. )


Cardiovascular:  Regular Rate, Rhythm, No Murmur, Tachycardia


Capillary Refill:  Less Than 3 Seconds


Gastrointestinal:  normal bowel sounds, non tender, soft


Extremity:  Other (1-2+ edema of the lower extremities 2+ of the upper 

extremities)


Neurologic/Psychiatric:  Alert, Oriented x3, No Motor/Sensory Deficits, Normal 

Mood/Affect


Skin:  Normal Color, Warm/Dry


Lymphatic:  No Adenopathy





Results


Lab


Laboratory Tests


21 02:50








21 01:11











Assessment/Plan


Assessment/Plan


(Tele-ICU Physician , Progress Note ) 





Available chart/ vitals / labs / Images reviewed 


Video assessment done using  teleICU camera, rest of exam as per RN 


 can tot reach  RN for discussion with multiple attempts 





Events overnight :   Did not tolerate un-proning to supine (same as on 0700 

, at that time un-proned to supine then reproned due to tachypnia and 

lowered sats.) Has to stay supine with >36 hrs proned, // iv push paralytic, 

vent changes. Temp is 103, earlier increased motrin and tylenol dosing and 

cooling blanket.





low grade fever , and again - persistent 


hemodynamically stable, no pressors, 





I/O = pos 3600


Drips:  propofol ativan  fentanyl  ,  norepi 0.02





Consultants:  





CXR r  eport reviewed.has bilateral opacities


 -   increased RLL density - atelectais ?





blood cx  - neg


sputum - usual geno ,  = staph aureus


port  staph coag  neg


Urine  - staph coag neg





Hospital course: 


=41 y/o with Hx of DM2, possible MARILY, non compliant with meds. Came in for 

COVID-19


-ICU for increased WOB and worsening hypoxia. Was on high flow @ 40 kpm with

FiO2 100%, now back on BiPAP  12/8 FiO2 90%.


- BiPAP  15/8 FiO2 100%. rr 28 tv 800, MV 28L= > intubated 


  - shock , started on levo 


) Full DNR, maintain current level of care.


() 0300, Did not tolerate un-proning to supine, iv push paralytic, vent 

changes. Temp is 103





VENT SETTINGS. ac - 1-- % peep 14 , tv 520 rr 26 


ABG  reviewed 





SBT - not candidate today 


 vital /Cardiovascular Stability/Sedation Score/FI02/PEEP/ABG/CXR reviewed . 





A/P 


Acute reps failure - , hypoxic , Covid PNA


- BiPAP  15/8 FiO2 100%. rr 28 tv 800, MV 28L- discussed with patient and RN in 

room - patient is getting tired with this WOB ,  looks laboured 


-  decided to intubate , patient agreed - intubated without complications , 

cont  proning latter today  2pm - 5 am . RLL atelectasis  - to follow 


- on peep 18 with 100% - might need paralisis and diuresis - follow closely 





COVID19


 Remdesivir 


 Decadron PO 6 - to IV  , increased dose 


 ddimet 0.5    -> lovenox proph dose 





DM II 


- levemir increased  ISShold levemir today - follow on NPO 





Leukocytosis


- vanco  





possible MARILY





Lines :    PICC , (Central Line Necessity Reviewed) 


Balderas: 


O/22


Nutrition:  TF whele supine 


Analgesia:  fentanl prn 





Anxiety/ delirium  = propofol , intubated  = AAO 





VTE Prophylaxis:  Lov 40 q12


Stress Ulcer Prophylaxis:  PPI


Glycemic Control:  +





Plans in collaboration with  bedside consultants and IM MDs. 


RN to reach out if any questions or concerns 





A total of  32 minutes of critical care time was devoted to this patient today, 

required to treat and/or prevent further deterioration of critical care 

condition ( as above ) .











CALVIN VASQUEZ MD         2021 18:19

## 2021-06-28 NOTE — OCC THERAPY PROGRESS NOTE
Therapy Progress Note


Pt is currently intubated.  OT will continue to monitor pt status and initiate 

treatment when pt is medically stable and able to actively participate with 

skilled therapy.











MAURA YIN               Jun 28, 2021 06:55

## 2021-06-28 NOTE — PROGRESS NOTE - HOSPITALIST
Subjective


HPI/CC On Admission


Date Seen by Provider:  Jun 28, 2021


Time Seen by Provider:  10:00


Chief complaint: Shortness of breath due to Covid





History of present illness: This is a 40-year-old white male diabetic noncom

pliant with diabetic medication who does not use oxygen or CPAP machine but 

appears to be high risk for CPAP due to neck circumference and BMI of 40 who 

presented to the ER with shortness of breath was diagnosed with Covid and he is 

hypoxic.  Patient has progressed throughout the day and at 2100 hrs. the 

decision was made to transfer up to the ICU for pulmonary critical care 

management due to maxed on Vapotherm likely will need BiPAP and possible 

intubation.  His blood sugars remain in the 300s with aggressive insulin regimen

trying to decrease the level.  He does not currently smoke or drink alcohol any 

works for [employer].  He has not been vaccinated against COVID-19.


Subjective/Events-last exam


Pt still remains on the vent 


Prone positioning required sedation because of respiratory rate of 60


White count 23,000 


Pt not doing well


Poor prognosis





Focused Exam


Lactate Level


6/26/21 16:03: Lactic Acid Level 1.27








Objective


Exam


Vital Signs





Vital Signs








  Date Time  Temp Pulse Resp B/P (MAP) Pulse Ox O2 Delivery O2 Flow Rate FiO2


 


6/29/21 05:36  80  161/64    


 


6/29/21 05:00 37.7  27  92 Mechanical Ventilator 100.00 


 


6/29/21 04:00        100





Capillary Refill : Less Than 3 Seconds


General Appearance:  No Apparent Distress, WD/WN, Chronically ill, Obese, Other 

(Sedated on vent)


Respiratory:  Lungs Clear, Decreased Breath Sounds


Cardiovascular:  Regular Rate, Rhythm





Results/Procedures


Lab


Laboratory Tests


6/29/21 02:50








Patient resulted labs reviewed.





Assessment/Plan


Assessment and Plan


Assess & Plan/Chief Complaint


Assessment:


Ventilator dependent respiratory failure


Acute hypoxic respiratory failure from COVID-19 pneumonia requiring intubation 

since failed Vapotherm and BiPAP


Diabetes out-of-control


Obesity


Suspicion of obstructive sleep apnea





Plan:


Supportive care


Prognosis guarded





Critical Care


Critically Ill Patient





Diagnosis/Problems


Diagnosis/Problems





(1) COVID-19


Status:  Acute


(2) Hypoxia


Status:  Acute











CECELIA BROOKS DO                Jun 28, 2021 06:38

## 2021-06-29 VITALS — SYSTOLIC BLOOD PRESSURE: 109 MMHG | DIASTOLIC BLOOD PRESSURE: 58 MMHG

## 2021-06-29 VITALS — SYSTOLIC BLOOD PRESSURE: 104 MMHG | DIASTOLIC BLOOD PRESSURE: 71 MMHG

## 2021-06-29 VITALS — DIASTOLIC BLOOD PRESSURE: 57 MMHG | SYSTOLIC BLOOD PRESSURE: 99 MMHG

## 2021-06-29 VITALS — DIASTOLIC BLOOD PRESSURE: 70 MMHG | SYSTOLIC BLOOD PRESSURE: 112 MMHG

## 2021-06-29 VITALS — DIASTOLIC BLOOD PRESSURE: 56 MMHG | SYSTOLIC BLOOD PRESSURE: 96 MMHG

## 2021-06-29 VITALS — DIASTOLIC BLOOD PRESSURE: 56 MMHG | SYSTOLIC BLOOD PRESSURE: 140 MMHG

## 2021-06-29 VITALS — DIASTOLIC BLOOD PRESSURE: 62 MMHG | SYSTOLIC BLOOD PRESSURE: 116 MMHG

## 2021-06-29 VITALS — SYSTOLIC BLOOD PRESSURE: 85 MMHG | DIASTOLIC BLOOD PRESSURE: 53 MMHG

## 2021-06-29 VITALS — SYSTOLIC BLOOD PRESSURE: 108 MMHG | DIASTOLIC BLOOD PRESSURE: 58 MMHG

## 2021-06-29 VITALS — SYSTOLIC BLOOD PRESSURE: 102 MMHG | DIASTOLIC BLOOD PRESSURE: 64 MMHG

## 2021-06-29 VITALS — DIASTOLIC BLOOD PRESSURE: 50 MMHG | SYSTOLIC BLOOD PRESSURE: 142 MMHG

## 2021-06-29 VITALS — DIASTOLIC BLOOD PRESSURE: 72 MMHG | SYSTOLIC BLOOD PRESSURE: 154 MMHG

## 2021-06-29 VITALS — DIASTOLIC BLOOD PRESSURE: 58 MMHG | SYSTOLIC BLOOD PRESSURE: 102 MMHG

## 2021-06-29 VITALS — DIASTOLIC BLOOD PRESSURE: 67 MMHG | SYSTOLIC BLOOD PRESSURE: 118 MMHG

## 2021-06-29 VITALS — SYSTOLIC BLOOD PRESSURE: 96 MMHG | DIASTOLIC BLOOD PRESSURE: 56 MMHG

## 2021-06-29 VITALS — DIASTOLIC BLOOD PRESSURE: 57 MMHG | SYSTOLIC BLOOD PRESSURE: 91 MMHG

## 2021-06-29 VITALS — DIASTOLIC BLOOD PRESSURE: 55 MMHG | SYSTOLIC BLOOD PRESSURE: 94 MMHG

## 2021-06-29 VITALS — SYSTOLIC BLOOD PRESSURE: 140 MMHG | DIASTOLIC BLOOD PRESSURE: 56 MMHG

## 2021-06-29 VITALS — DIASTOLIC BLOOD PRESSURE: 65 MMHG | SYSTOLIC BLOOD PRESSURE: 138 MMHG

## 2021-06-29 VITALS — DIASTOLIC BLOOD PRESSURE: 65 MMHG | SYSTOLIC BLOOD PRESSURE: 119 MMHG

## 2021-06-29 VITALS — DIASTOLIC BLOOD PRESSURE: 53 MMHG | SYSTOLIC BLOOD PRESSURE: 93 MMHG

## 2021-06-29 VITALS — DIASTOLIC BLOOD PRESSURE: 55 MMHG | SYSTOLIC BLOOD PRESSURE: 93 MMHG

## 2021-06-29 VITALS — SYSTOLIC BLOOD PRESSURE: 108 MMHG | DIASTOLIC BLOOD PRESSURE: 51 MMHG

## 2021-06-29 VITALS — SYSTOLIC BLOOD PRESSURE: 108 MMHG | DIASTOLIC BLOOD PRESSURE: 67 MMHG

## 2021-06-29 VITALS — SYSTOLIC BLOOD PRESSURE: 91 MMHG | DIASTOLIC BLOOD PRESSURE: 53 MMHG

## 2021-06-29 VITALS — DIASTOLIC BLOOD PRESSURE: 70 MMHG | SYSTOLIC BLOOD PRESSURE: 129 MMHG

## 2021-06-29 VITALS — SYSTOLIC BLOOD PRESSURE: 126 MMHG | DIASTOLIC BLOOD PRESSURE: 59 MMHG

## 2021-06-29 VITALS — SYSTOLIC BLOOD PRESSURE: 96 MMHG | DIASTOLIC BLOOD PRESSURE: 50 MMHG

## 2021-06-29 LAB
ARTERIAL PATENCY WRIST A: (no result)
BASE EXCESS STD BLDA CALC-SCNC: -2.4 MMOL/L (ref -2.5–2.5)
BASOPHILS # BLD AUTO: 0.1 10^3/UL (ref 0–0.1)
BASOPHILS NFR BLD AUTO: 0 % (ref 0–10)
BDY SITE: (no result)
BODY TEMPERATURE: 38.1
BUN/CREAT SERPL: 18
CALCIUM SERPL-MCNC: 7.5 MG/DL (ref 8.5–10.1)
CHLORIDE SERPL-SCNC: 111 MMOL/L (ref 98–107)
CO2 BLDA CALC-SCNC: 23.5 MMOL/L (ref 21–31)
CO2 SERPL-SCNC: 20 MMOL/L (ref 21–32)
CREAT SERPL-MCNC: 0.82 MG/DL (ref 0.6–1.3)
EOSINOPHIL # BLD AUTO: 0.3 10^3/UL (ref 0–0.3)
EOSINOPHIL NFR BLD AUTO: 1 % (ref 0–10)
EOSINOPHIL NFR BLD MANUAL: 1 %
GFR SERPLBLD BASED ON 1.73 SQ M-ARVRAT: > 60 ML/MIN
GLUCOSE SERPL-MCNC: 132 MG/DL (ref 70–105)
HCT VFR BLD CALC: 33 % (ref 40–54)
HGB BLD-MCNC: 11 G/DL (ref 13.3–17.7)
INHALED O2 FLOW RATE: (no result) L/MIN
LYMPHOCYTES # BLD AUTO: 1.4 10^3/UL (ref 1–4)
LYMPHOCYTES NFR BLD AUTO: 5 % (ref 12–44)
MAGNESIUM SERPL-MCNC: 1.4 MG/DL (ref 1.6–2.4)
MAGNESIUM SERPL-MCNC: 1.8 MG/DL (ref 1.6–2.4)
MANUAL DIFFERENTIAL PERFORMED BLD QL: YES
MCH RBC QN AUTO: 28 PG (ref 25–34)
MCHC RBC AUTO-ENTMCNC: 33 G/DL (ref 32–36)
MCV RBC AUTO: 85 FL (ref 80–99)
MONOCYTES # BLD AUTO: 0.7 10^3/UL (ref 0–1)
MONOCYTES NFR BLD AUTO: 3 % (ref 0–12)
MONOCYTES NFR BLD: 5 %
NEUTROPHILS # BLD AUTO: 21.9 10^3/UL (ref 1.8–7.8)
NEUTROPHILS NFR BLD AUTO: 86 % (ref 42–75)
NEUTS BAND NFR BLD MANUAL: 90 %
NEUTS BAND NFR BLD: 2 %
PCO2 BLDA: 43 MMHG (ref 35–45)
PH BLDA: 7.35 [PH] (ref 7.37–7.43)
PHOSPHATE SERPL-MCNC: 3.4 MG/DL (ref 2.3–4.7)
PLATELET # BLD: 452 10^3/UL (ref 130–400)
PMV BLD AUTO: 9.8 FL (ref 9–12.2)
PO2 BLDA: 70 MMHG (ref 79–93)
POTASSIUM SERPL-SCNC: 3.1 MMOL/L (ref 3.6–5)
POTASSIUM SERPL-SCNC: 3.7 MMOL/L (ref 3.6–5)
RBC MORPH BLD: NORMAL
SAO2 % BLDA FROM PO2: 88 % (ref 94–100)
SODIUM SERPL-SCNC: 143 MMOL/L (ref 135–145)
TRIGL SERPL-MCNC: 222 MG/DL (ref ?–150)
VARIANT LYMPHS NFR BLD MANUAL: 2 %
VENTILATION MODE VENT: YES
WBC # BLD AUTO: 25.6 10^3/UL (ref 4.3–11)

## 2021-06-29 RX ADMIN — IBUPROFEN PRN MG: 600 TABLET ORAL at 07:58

## 2021-06-29 RX ADMIN — ALBUTEROL SULFATE SCH GM: 90 AEROSOL, METERED RESPIRATORY (INHALATION) at 21:04

## 2021-06-29 RX ADMIN — PROPOFOL SCH MLS/HR: 10 INJECTION, EMULSION INTRAVENOUS at 00:19

## 2021-06-29 RX ADMIN — Medication SCH MLS/HR: at 15:24

## 2021-06-29 RX ADMIN — IBUPROFEN PRN MG: 600 TABLET ORAL at 02:35

## 2021-06-29 RX ADMIN — PROPOFOL SCH MLS/HR: 10 INJECTION, EMULSION INTRAVENOUS at 15:25

## 2021-06-29 RX ADMIN — POTASSIUM CHLORIDE SCH MLS/HR: 200 INJECTION, SOLUTION INTRAVENOUS at 20:40

## 2021-06-29 RX ADMIN — ENOXAPARIN SODIUM SCH MG: 100 INJECTION SUBCUTANEOUS at 07:52

## 2021-06-29 RX ADMIN — ALBUTEROL SULFATE SCH GM: 90 AEROSOL, METERED RESPIRATORY (INHALATION) at 06:40

## 2021-06-29 RX ADMIN — Medication SCH MLS/HR: at 20:44

## 2021-06-29 RX ADMIN — Medication SCH MLS/HR: at 09:57

## 2021-06-29 RX ADMIN — Medication SCH MLS/HR: at 00:28

## 2021-06-29 RX ADMIN — DEXTROSE MONOHYDRATE SCH MLS/HR: 5 INJECTION, SOLUTION INTRAVENOUS at 08:40

## 2021-06-29 RX ADMIN — ALBUTEROL SULFATE SCH GM: 90 AEROSOL, METERED RESPIRATORY (INHALATION) at 18:43

## 2021-06-29 RX ADMIN — DEXTROSE MONOHYDRATE SCH MLS/HR: 5 INJECTION, SOLUTION INTRAVENOUS at 08:09

## 2021-06-29 RX ADMIN — INSULIN ASPART SCH UNIT: 100 INJECTION, SOLUTION INTRAVENOUS; SUBCUTANEOUS at 11:46

## 2021-06-29 RX ADMIN — DEXTROSE MONOHYDRATE SCH MLS/HR: 5 INJECTION, SOLUTION INTRAVENOUS at 18:04

## 2021-06-29 RX ADMIN — MAGNESIUM SULFATE IN DEXTROSE SCH MLS/HR: 10 INJECTION, SOLUTION INTRAVENOUS at 03:53

## 2021-06-29 RX ADMIN — POTASSIUM CHLORIDE SCH MLS/HR: 200 INJECTION, SOLUTION INTRAVENOUS at 03:53

## 2021-06-29 RX ADMIN — POTASSIUM CHLORIDE SCH MEQ: 1500 TABLET, EXTENDED RELEASE ORAL at 03:53

## 2021-06-29 RX ADMIN — SODIUM CHLORIDE SCH MLS/HR: 900 INJECTION INTRAVENOUS at 19:50

## 2021-06-29 RX ADMIN — DOCUSATE SODIUM AND SENNOSIDES SCH EA: 8.6; 5 TABLET, FILM COATED ORAL at 20:29

## 2021-06-29 RX ADMIN — FLUTICASONE PROPIONATE AND SALMETEROL SCH EACH: 232; 14 POWDER, METERED RESPIRATORY (INHALATION) at 06:40

## 2021-06-29 RX ADMIN — PROPOFOL SCH MLS/HR: 10 INJECTION, EMULSION INTRAVENOUS at 05:36

## 2021-06-29 RX ADMIN — SODIUM CHLORIDE SCH MLS/HR: 900 INJECTION INTRAVENOUS at 14:04

## 2021-06-29 RX ADMIN — DEXTROSE MONOHYDRATE SCH MLS/HR: 5 INJECTION, SOLUTION INTRAVENOUS at 22:40

## 2021-06-29 RX ADMIN — PROPOFOL SCH MLS/HR: 10 INJECTION, EMULSION INTRAVENOUS at 05:35

## 2021-06-29 RX ADMIN — VANCOMYCIN HYDROCHLORIDE SCH MLS/HR: 500 INJECTION, POWDER, LYOPHILIZED, FOR SOLUTION INTRAVENOUS at 09:55

## 2021-06-29 RX ADMIN — SIMVASTATIN SCH MG: 10 TABLET, FILM COATED ORAL at 20:29

## 2021-06-29 RX ADMIN — DOCUSATE SODIUM AND SENNOSIDES SCH EA: 8.6; 5 TABLET, FILM COATED ORAL at 07:53

## 2021-06-29 RX ADMIN — FLUTICASONE PROPIONATE AND SALMETEROL SCH EACH: 232; 14 POWDER, METERED RESPIRATORY (INHALATION) at 21:06

## 2021-06-29 RX ADMIN — Medication SCH MLS/HR: at 14:06

## 2021-06-29 RX ADMIN — MINERAL OIL AND WHITE PETROLATUM SCH APPLIC: 150; 830 OINTMENT OPHTHALMIC at 19:50

## 2021-06-29 RX ADMIN — ALBUTEROL SULFATE SCH GM: 90 AEROSOL, METERED RESPIRATORY (INHALATION) at 10:12

## 2021-06-29 RX ADMIN — SODIUM CHLORIDE SCH MLS/HR: 900 INJECTION INTRAVENOUS at 08:37

## 2021-06-29 RX ADMIN — PROPOFOL SCH MLS/HR: 10 INJECTION, EMULSION INTRAVENOUS at 10:15

## 2021-06-29 RX ADMIN — PANTOPRAZOLE SODIUM SCH MG: 40 INJECTION, POWDER, FOR SOLUTION INTRAVENOUS at 07:52

## 2021-06-29 RX ADMIN — ALBUTEROL SULFATE SCH GM: 90 AEROSOL, METERED RESPIRATORY (INHALATION) at 02:12

## 2021-06-29 RX ADMIN — PROPOFOL SCH MLS/HR: 10 INJECTION, EMULSION INTRAVENOUS at 20:41

## 2021-06-29 RX ADMIN — INSULIN ASPART SCH UNIT: 100 INJECTION, SOLUTION INTRAVENOUS; SUBCUTANEOUS at 23:54

## 2021-06-29 RX ADMIN — POTASSIUM CHLORIDE SCH MLS/HR: 200 INJECTION, SOLUTION INTRAVENOUS at 21:50

## 2021-06-29 RX ADMIN — MINERAL OIL AND WHITE PETROLATUM SCH APPLIC: 150; 830 OINTMENT OPHTHALMIC at 07:53

## 2021-06-29 RX ADMIN — PROPOFOL SCH MLS/HR: 10 INJECTION, EMULSION INTRAVENOUS at 10:17

## 2021-06-29 RX ADMIN — POTASSIUM CHLORIDE SCH MLS/HR: 200 INJECTION, SOLUTION INTRAVENOUS at 19:49

## 2021-06-29 RX ADMIN — IBUPROFEN PRN MG: 600 TABLET ORAL at 20:29

## 2021-06-29 RX ADMIN — MAGNESIUM SULFATE IN DEXTROSE SCH MLS/HR: 10 INJECTION, SOLUTION INTRAVENOUS at 19:49

## 2021-06-29 RX ADMIN — VANCOMYCIN HYDROCHLORIDE SCH MLS/HR: 500 INJECTION, POWDER, LYOPHILIZED, FOR SOLUTION INTRAVENOUS at 18:05

## 2021-06-29 RX ADMIN — INSULIN ASPART SCH UNIT: 100 INJECTION, SOLUTION INTRAVENOUS; SUBCUTANEOUS at 03:53

## 2021-06-29 RX ADMIN — INSULIN ASPART SCH UNIT: 100 INJECTION, SOLUTION INTRAVENOUS; SUBCUTANEOUS at 18:15

## 2021-06-29 RX ADMIN — Medication SCH MLS/HR: at 04:49

## 2021-06-29 RX ADMIN — ALBUTEROL SULFATE SCH GM: 90 AEROSOL, METERED RESPIRATORY (INHALATION) at 14:29

## 2021-06-29 RX ADMIN — MAGNESIUM SULFATE IN DEXTROSE SCH MLS/HR: 10 INJECTION, SOLUTION INTRAVENOUS at 20:40

## 2021-06-29 RX ADMIN — ENOXAPARIN SODIUM SCH MG: 100 INJECTION SUBCUTANEOUS at 20:30

## 2021-06-29 NOTE — PHYSICAL THERAPY PROGRESS NOTE
Therapy Progress Note


Patient intubated and sedated this a.m.  PT will continue to monitor patient 

status and initiate treatment when patient is able to actively participate with 

skilled therapy.











LORI SY PT              Jun 29, 2021 07:49

## 2021-06-29 NOTE — OCC THERAPY PROGRESS NOTE
Therapy Progress Note


Pt is currently intubated.  OT will continue to monitor pt status and initiate 

treatment when pt is medically stable and able to actively participate with 

skilled therapy.











MAURA YIN               Jun 29, 2021 06:55

## 2021-06-29 NOTE — PROGRESS NOTE - HOSPITALIST
Subjective


HPI/CC On Admission


Date Seen by Provider:  Jun 29, 2021


Time Seen by Provider:  10:00


Chief complaint: Shortness of breath due to Covid





History of present illness: This is a 40-year-old white male diabetic noncom

pliant with diabetic medication who does not use oxygen or CPAP machine but 

appears to be high risk for CPAP due to neck circumference and BMI of 40 who 

presented to the ER with shortness of breath was diagnosed with Covid and he is 

hypoxic.  Patient has progressed throughout the day and at 2100 hrs. the 

decision was made to transfer up to the ICU for pulmonary critical care 

management due to maxed on Vapotherm likely will need BiPAP and possible 

intubation.  His blood sugars remain in the 300s with aggressive insulin regimen

trying to decrease the level.  He does not currently smoke or drink alcohol any 

works for [employer].  He has not been vaccinated against COVID-19.


Subjective/Events-last exam


Pt continues to be complex


Cant tolerate prone position due to tachypnea and unstable vital signs when 

they move him


Very poor prognosis 


Reviewed meds and labs





Focused Exam


Lactate Level








Objective


Exam


Vital Signs





Vital Signs








  Date Time  Temp Pulse Resp B/P (MAP) Pulse Ox O2 Delivery O2 Flow Rate FiO2


 


6/30/21 05:12 38.3       


 


6/30/21 04:00     92 Mechanical Ventilator  100


 


6/30/21 03:00  85 26 141/69 (93)   100.00 





Capillary Refill : Less Than 3 Seconds


General Appearance:  No Apparent Distress, WD/WN, Chronically ill, Obese, Other 

(Sedated and intubated)


Respiratory:  Lungs Clear, Decreased Breath Sounds


Cardiovascular:  Regular Rate, Rhythm





Results/Procedures


Lab


Laboratory Tests


6/29/21 18:15








6/30/21 02:25








Patient resulted labs reviewed.





Assessment/Plan


Assessment and Plan


Assess & Plan/Chief Complaint


Assessment:


Ventilator dependent respiratory failure


Acute hypoxic respiratory failure from COVID-19 pneumonia requiring intubation 

since failed Vapotherm and BiPAP


Diabetes out-of-control


Obesity


Suspicion of obstructive sleep apnea


Bacteremia with staph replaced PICC line





Plan:


Supportive care


Prognosis guarded





6/29/2021:


Complex issues


Poor prognosis


Appreciate eICU vent management


Change out PICC line due to bacteremia and culture tip





Critical Care


Ventilator Management





Diagnosis/Problems


Diagnosis/Problems





(1) COVID-19


Status:  Acute


(2) Hypoxia


Status:  Acute











CECELIA BROOKS DO                Jun 29, 2021 06:39

## 2021-06-29 NOTE — TELE-ICU PROGRESS NOTE
Subjective


Date Seen by a Provider:  2021


Time Seen by a Provider:  10:02





Sepsis Event


Evaluation


Height, Weight, BMI


Height: 5'11"


Weight: 305lbs. oz. 138.754783js; 39.92 BMI


Method:Stated





Focused Exam


Lactate Level


21 16:03: Lactic Acid Level 1.27





Exam


Exam


Patient acknowledged, consented, and participated in this virtual visit which 

was conducted using real time audio/video


Vital Signs








  Date Time  Temp Pulse Resp B/P (MAP) Pulse Ox O2 Delivery O2 Flow Rate FiO2


 


21 08:09    146/68    


 


21 08:00     88 Mechanical Ventilator  100


 


21 07:00  96      


 


21 06:46  84 40  87   100


 


21 06:00 37.7 87 32 108/51 (70) 87 Mechanical Ventilator 100.00 


 


21 05:36  80  161/64    


 


21 05:35  80  164/76    


 


21 05:00 37.7 68 27 119/65 (83) 92 Mechanical Ventilator 100.00 


 


21 04:00     90 Mechanical Ventilator  100


 


21 04:00 38.1 82 32 96/50 (65) 90 Mechanical Ventilator 100.00 


 


21 03:00 38.1 75 26 129/70 (89) 89 Mechanical Ventilator 100.00 


 


21 02:35 38.0       


 


21 02:12  73 29  90   100


 


21 02:00 37.9 72 26 112/70 (84) 90 Mechanical Ventilator 100.00 


 


21 01:00 37.8 70 26 108/67 (81) 91 Mechanical Ventilator 100.00 


 


21 01:00  70      


 


21 00:28  70  104/63    


 


21 00:19  70  107/64    


 


21 00:19  70  107/64    


 


21 23:20     90 Mechanical Ventilator  100


 


21 23:20 37.8     Mechanical Ventilator 100.00 


 


21 23:00 37.8 70 26 108/65 (79) 90 Mechanical Ventilator 100.00 


 


21 22:41  71 30  90   100


 


21 22:00 37.8 74 26 111/62 (78) 92 Mechanical Ventilator 100.00 


 


21 21:15 38.4       


 


21 21:00 38.5 85 38 123/62 (82) 89 Mechanical Ventilator 100.00 


 


21 20:46 38.6       


 


21 20:00 38.9 90 28 103/51 (68) 87 Mechanical Ventilator 100.00 


 


21 19:40     88 Mechanical Ventilator  100


 


21 19:00  86      


 


21 19:00 38.7 94 39 144/58 (86) 88 Mechanical Ventilator 100.00 


 


21 18:41    97/56    


 


21 18:40    96/49    


 


21 18:13  77 28  88   100


 


21 18:00 39.7 97 35 100/49 (66) 87 Mechanical Ventilator 100.00 


 


21 17:00 39.5 88 30 149/60 (89) 86 Mechanical Ventilator 100.00 


 


21 16:00 38.9 96 37 147/56 (86) 88 Mechanical Ventilator 100.00 


 


21 15:06     90 Mechanical Ventilator  100


 


21 15:00 38.4 73 28 132/69 (90) 85 Mechanical Ventilator 100.00 


 


21 14:56    127/68    


 


21 14:01  70 28  91   100


 


21 14:00 38.1 70 26 121/72 (88) 91 Mechanical Ventilator 100.00 


 


21 13:00 37.9 70 26 124/73 (90) 90 Mechanical Ventilator 100.00 


 


21 13:00  71      


 


21 12:00 37.7 70 26 134/67 (89) 90 Mechanical Ventilator 100.00 


 


21 11:20  71 27  82   100


 


21 11:06     89 Mechanical Ventilator  100


 


21 11:00 37.6 70 28 130/68 (88) 90 Mechanical Ventilator 100.00 


 


21 10:22    110/64    


 


21 10:21    106/64    














I & O 


 


 21





 07:00


 


Intake Total 2100 ml


 


Output Total 1900 ml


 


Balance 200 ml








Height & Weight


Height: 5'11"


Weight: 305lbs. oz. 138.883567tz; 39.92 BMI


Method:Stated


General Appearance:  No Apparent Distress, WD/WN, Chronically ill, Obese, Other 

(Sedated on vent)


HEENT:  PERRL/EOMI, Normal ENT Inspection, Pharynx Normal, Moist Mucous 

Membranes


Neck:  Full Range of Motion, Normal Inspection, Non Tender


Respiratory:  Lungs Clear, Decreased Breath Sounds


Cardiovascular:  Regular Rate, Rhythm


Capillary Refill:  Less Than 3 Seconds


Peripheral Pulses:  2+ Dorsalis Pedis (R), 2+ Left Dors-Pedis (L), 2+ Radial 

Pulses (R)


Gastrointestinal:  normal bowel sounds, non tender, soft


Extremity:  Other (2+ edema upper extremities 1+ lower no purpura or rash.)


Neurologic/Psychiatric:  Alert, Oriented x3, No Motor/Sensory Deficits, Normal 

Mood/Affect


Skin:  Normal Color, Warm/Dry


Lymphatic:  No Adenopathy





Results


Lab


Laboratory Tests


21 01:11








21 02:50











Assessment/Plan


Assessment/Plan


(Tele-ICU Physician , Progress Note ) 





Available chart/ vitals / labs / Images reviewed 


Video assessment done using  teleICU camera, rest of exam as per RN 


 can tot reach  RN for discussion with multiple attempts 





Events overnight :   this am desats - iv push paralytic,  lasix IV given 


hemodynamically stable, pressors ?





I/O = pos 700


Drips:  propofol,  ativan  off,  fentanyl  ,  norepi 0.02





Consultants:  





CXR r  eport reviewed.has bilateral opacities


 -   increased RLL density - atelectais ?





blood cx  - neg


sputum - usual geno ,  = staph aureus


port  staph coag  neg


Urine  - staph coag neg





Hospital course: 


=41 y/o with Hx of DM2, possible MARILY, non compliant with meds. Came in for 

COVID-19


-ICU for increased WOB and worsening hypoxia. Was on high flow @ 40 kpm with

FiO2 100%, now back on BiPAP  12/8 FiO2 90%.


- BiPAP  15/8 FiO2 100%. rr 28 tv 800, MV 28L= > intubated 


  - shock , started on levo 


) Full DNR, maintain current level of care.


() 0300, Did not tolerate un-proning to supine, iv push paralytic, vent 

changes. Temp is 103


 -  hold proning , vent changed ,  push paralytics 





VENT SETTINGS. ac - 100 % peep 18 , tv 520 rr 26 


ABG  reviewed 





SBT - not candidate today 


 vital /Cardiovascular Stability/Sedation Score/FI02/PEEP/ABG/CXR reviewed . 





A/P 


Acute reps failure - , hypoxic , Covid PNA


- BiPAP  15/8 FiO2 100%. rr 28 tv 800, MV 28L- discussed with patient and RN in 

room - patient is getting tired with this WOB ,  looks laboured 


-  decided to intubate , patient agreed - intubated without complications , 

cont  proning latter today  2pm - 5 am . RLL atelectasis  - to follow 


- on peep 18 with 100% - - 


- holding prove on now 


=== try paralisis and diuresis  today follow closely 





COVID19


 Remdesivir 


 Decadron PO 6 - to IV  , increased dose 


 ddimet 0.5    -> lovenox proph dose 





Shock 


- on levo o.02 





DM II 


- levemir increased  ISShold levemir today - follow on NPO 





Leukocytosis


- vanco   persistent vfever and ? wbc = add merem 


line _ staph hominis - might need to replace - will talk to lizz REES  - repeat

cx now 





Hypernatremia - resolved , anticipate ?NA with diuresis - will  start D5 w 





possible MARILY





Lines :    PICC , (Central Line Necessity Reviewed) - 


Balderas: 


O/22


Nutrition:  TF while supine 


Analgesia:  fentanyl prn 





Anxiety/ delirium  = propofol , intubated  = AAO 





VTE Prophylaxis:  Lov 40 q12


Stress Ulcer Prophylaxis:  PPI


Glycemic Control:  +





Plans in collaboration with  bedside consultants and IM MDs. 


RN to reach out if any questions or concerns 





A total of  35 minutes of critical care time was devoted to this patient today, 

required to treat and/or prevent further deterioration of critical care 

condition ( as above ) .











CALVIN VASQUEZ MD         2021 10:02

## 2021-06-29 NOTE — DIAGNOSTIC IMAGING REPORT
EXAMINATION:

CHEST 1 VIEW, AP/PA ONLY.



INDICATION: 

Intubation.



COMPARISON: 

06/28/2021.



FINDINGS:

Stable ET and enteric tubes. Stable left IJ central venous

catheter. Bilateral multifocal pulmonary consolidations are

unchanged. No pleural effusion or pneumothorax. Stable

cardiomediastinal silhouette.



IMPRESSION: 

1. Stable support devices.

2. No change in bilateral multifocal pneumonia.



Dictated by: 



  Dictated on workstation # [id number]

## 2021-06-30 VITALS — SYSTOLIC BLOOD PRESSURE: 108 MMHG | DIASTOLIC BLOOD PRESSURE: 63 MMHG

## 2021-06-30 VITALS — SYSTOLIC BLOOD PRESSURE: 115 MMHG | DIASTOLIC BLOOD PRESSURE: 64 MMHG

## 2021-06-30 VITALS — DIASTOLIC BLOOD PRESSURE: 60 MMHG | SYSTOLIC BLOOD PRESSURE: 108 MMHG

## 2021-06-30 VITALS — SYSTOLIC BLOOD PRESSURE: 115 MMHG | DIASTOLIC BLOOD PRESSURE: 75 MMHG

## 2021-06-30 VITALS — DIASTOLIC BLOOD PRESSURE: 66 MMHG | SYSTOLIC BLOOD PRESSURE: 111 MMHG

## 2021-06-30 VITALS — SYSTOLIC BLOOD PRESSURE: 144 MMHG | DIASTOLIC BLOOD PRESSURE: 65 MMHG

## 2021-06-30 VITALS — DIASTOLIC BLOOD PRESSURE: 58 MMHG | SYSTOLIC BLOOD PRESSURE: 17 MMHG

## 2021-06-30 VITALS — DIASTOLIC BLOOD PRESSURE: 64 MMHG | SYSTOLIC BLOOD PRESSURE: 120 MMHG

## 2021-06-30 VITALS — DIASTOLIC BLOOD PRESSURE: 59 MMHG | SYSTOLIC BLOOD PRESSURE: 102 MMHG

## 2021-06-30 VITALS — DIASTOLIC BLOOD PRESSURE: 68 MMHG | SYSTOLIC BLOOD PRESSURE: 133 MMHG

## 2021-06-30 VITALS — DIASTOLIC BLOOD PRESSURE: 61 MMHG | SYSTOLIC BLOOD PRESSURE: 106 MMHG

## 2021-06-30 VITALS — DIASTOLIC BLOOD PRESSURE: 64 MMHG | SYSTOLIC BLOOD PRESSURE: 123 MMHG

## 2021-06-30 VITALS — SYSTOLIC BLOOD PRESSURE: 118 MMHG | DIASTOLIC BLOOD PRESSURE: 61 MMHG

## 2021-06-30 VITALS — DIASTOLIC BLOOD PRESSURE: 52 MMHG | SYSTOLIC BLOOD PRESSURE: 90 MMHG

## 2021-06-30 VITALS — SYSTOLIC BLOOD PRESSURE: 111 MMHG | DIASTOLIC BLOOD PRESSURE: 62 MMHG

## 2021-06-30 VITALS — DIASTOLIC BLOOD PRESSURE: 58 MMHG | SYSTOLIC BLOOD PRESSURE: 110 MMHG

## 2021-06-30 VITALS — DIASTOLIC BLOOD PRESSURE: 50 MMHG | SYSTOLIC BLOOD PRESSURE: 99 MMHG

## 2021-06-30 VITALS — SYSTOLIC BLOOD PRESSURE: 124 MMHG | DIASTOLIC BLOOD PRESSURE: 58 MMHG

## 2021-06-30 VITALS — SYSTOLIC BLOOD PRESSURE: 94 MMHG | DIASTOLIC BLOOD PRESSURE: 58 MMHG

## 2021-06-30 VITALS — DIASTOLIC BLOOD PRESSURE: 49 MMHG | SYSTOLIC BLOOD PRESSURE: 108 MMHG

## 2021-06-30 VITALS — SYSTOLIC BLOOD PRESSURE: 95 MMHG | DIASTOLIC BLOOD PRESSURE: 56 MMHG

## 2021-06-30 VITALS — DIASTOLIC BLOOD PRESSURE: 59 MMHG | SYSTOLIC BLOOD PRESSURE: 112 MMHG

## 2021-06-30 VITALS — SYSTOLIC BLOOD PRESSURE: 109 MMHG | DIASTOLIC BLOOD PRESSURE: 68 MMHG

## 2021-06-30 VITALS — SYSTOLIC BLOOD PRESSURE: 106 MMHG | DIASTOLIC BLOOD PRESSURE: 63 MMHG

## 2021-06-30 VITALS — DIASTOLIC BLOOD PRESSURE: 58 MMHG | SYSTOLIC BLOOD PRESSURE: 101 MMHG

## 2021-06-30 VITALS — SYSTOLIC BLOOD PRESSURE: 122 MMHG | DIASTOLIC BLOOD PRESSURE: 67 MMHG

## 2021-06-30 VITALS — SYSTOLIC BLOOD PRESSURE: 103 MMHG | DIASTOLIC BLOOD PRESSURE: 58 MMHG

## 2021-06-30 VITALS — SYSTOLIC BLOOD PRESSURE: 100 MMHG | DIASTOLIC BLOOD PRESSURE: 57 MMHG

## 2021-06-30 VITALS — DIASTOLIC BLOOD PRESSURE: 69 MMHG | SYSTOLIC BLOOD PRESSURE: 141 MMHG

## 2021-06-30 VITALS — DIASTOLIC BLOOD PRESSURE: 64 MMHG | SYSTOLIC BLOOD PRESSURE: 105 MMHG

## 2021-06-30 LAB
ARTERIAL PATENCY WRIST A: (no result)
BASE EXCESS STD BLDA CALC-SCNC: -0.9 MMOL/L (ref -2.5–2.5)
BASOPHILS # BLD AUTO: 0.2 10^3/UL (ref 0–0.1)
BASOPHILS NFR BLD AUTO: 1 % (ref 0–10)
BDY SITE: (no result)
BODY TEMPERATURE: 37.9
BUN/CREAT SERPL: 17
CALCIUM SERPL-MCNC: 7.5 MG/DL (ref 8.5–10.1)
CHLORIDE SERPL-SCNC: 107 MMOL/L (ref 98–107)
CO2 BLDA CALC-SCNC: 25 MMOL/L (ref 21–31)
CO2 SERPL-SCNC: 21 MMOL/L (ref 21–32)
CREAT SERPL-MCNC: 1.13 MG/DL (ref 0.6–1.3)
EOSINOPHIL # BLD AUTO: 0.3 10^3/UL (ref 0–0.3)
EOSINOPHIL NFR BLD AUTO: 1 % (ref 0–10)
GFR SERPLBLD BASED ON 1.73 SQ M-ARVRAT: > 60 ML/MIN
GLUCOSE SERPL-MCNC: 248 MG/DL (ref 70–105)
HCT VFR BLD CALC: 34 % (ref 40–54)
HGB BLD-MCNC: 11.4 G/DL (ref 13.3–17.7)
INHALED O2 FLOW RATE: (no result) L/MIN
LYMPHOCYTES # BLD AUTO: 1.7 10^3/UL (ref 1–4)
LYMPHOCYTES NFR BLD AUTO: 6 % (ref 12–44)
MAGNESIUM SERPL-MCNC: 2.1 MG/DL (ref 1.6–2.4)
MANUAL DIFFERENTIAL PERFORMED BLD QL: NO
MCH RBC QN AUTO: 28 PG (ref 25–34)
MCHC RBC AUTO-ENTMCNC: 33 G/DL (ref 32–36)
MCV RBC AUTO: 83 FL (ref 80–99)
MONOCYTES # BLD AUTO: 0.9 10^3/UL (ref 0–1)
MONOCYTES NFR BLD AUTO: 3 % (ref 0–12)
NEUTROPHILS # BLD AUTO: 24.8 10^3/UL (ref 1.8–7.8)
NEUTROPHILS NFR BLD AUTO: 85 % (ref 42–75)
PCO2 BLDA: 44 MMHG (ref 35–45)
PH BLDA: 7.35 [PH] (ref 7.37–7.43)
PHOSPHATE SERPL-MCNC: 3.5 MG/DL (ref 2.3–4.7)
PLATELET # BLD: 434 10^3/UL (ref 130–400)
PMV BLD AUTO: 10.2 FL (ref 9–12.2)
PO2 BLDA: 76 MMHG (ref 79–93)
POTASSIUM SERPL-SCNC: 3.9 MMOL/L (ref 3.6–5)
SAO2 % BLDA FROM PO2: 91 % (ref 94–100)
SODIUM SERPL-SCNC: 141 MMOL/L (ref 135–145)
VENTILATION MODE VENT: YES
WBC # BLD AUTO: 29.3 10^3/UL (ref 4.3–11)

## 2021-06-30 RX ADMIN — INSULIN ASPART SCH UNIT: 100 INJECTION, SOLUTION INTRAVENOUS; SUBCUTANEOUS at 23:11

## 2021-06-30 RX ADMIN — DEXTROSE MONOHYDRATE SCH MLS/HR: 5 INJECTION, SOLUTION INTRAVENOUS at 08:11

## 2021-06-30 RX ADMIN — VANCOMYCIN HYDROCHLORIDE SCH MLS/HR: 500 INJECTION, POWDER, LYOPHILIZED, FOR SOLUTION INTRAVENOUS at 01:56

## 2021-06-30 RX ADMIN — INSULIN ASPART SCH UNIT: 100 INJECTION, SOLUTION INTRAVENOUS; SUBCUTANEOUS at 05:22

## 2021-06-30 RX ADMIN — SIMVASTATIN SCH MG: 10 TABLET, FILM COATED ORAL at 20:01

## 2021-06-30 RX ADMIN — Medication SCH MLS/HR: at 02:34

## 2021-06-30 RX ADMIN — ALBUTEROL SULFATE SCH GM: 90 AEROSOL, METERED RESPIRATORY (INHALATION) at 10:35

## 2021-06-30 RX ADMIN — POTASSIUM CHLORIDE SCH MLS/HR: 200 INJECTION, SOLUTION INTRAVENOUS at 02:58

## 2021-06-30 RX ADMIN — IPRATROPIUM BROMIDE SCH GM: 17 AEROSOL, METERED RESPIRATORY (INHALATION) at 14:15

## 2021-06-30 RX ADMIN — DEXTROSE MONOHYDRATE SCH MLS/HR: 5 INJECTION, SOLUTION INTRAVENOUS at 14:03

## 2021-06-30 RX ADMIN — FENTANYL CITRATE PRN MCG: 50 INJECTION, SOLUTION INTRAMUSCULAR; INTRAVENOUS at 18:47

## 2021-06-30 RX ADMIN — SODIUM CHLORIDE SCH MLS/HR: 900 INJECTION INTRAVENOUS at 20:00

## 2021-06-30 RX ADMIN — MINERAL OIL AND WHITE PETROLATUM SCH APPLIC: 150; 830 OINTMENT OPHTHALMIC at 08:13

## 2021-06-30 RX ADMIN — PROPOFOL SCH MLS/HR: 10 INJECTION, EMULSION INTRAVENOUS at 17:46

## 2021-06-30 RX ADMIN — Medication SCH MLS/HR: at 20:00

## 2021-06-30 RX ADMIN — SODIUM CHLORIDE SCH MLS/HR: 900 INJECTION INTRAVENOUS at 14:02

## 2021-06-30 RX ADMIN — ALBUTEROL SULFATE SCH GM: 90 AEROSOL, METERED RESPIRATORY (INHALATION) at 21:30

## 2021-06-30 RX ADMIN — IPRATROPIUM BROMIDE SCH PUFF: 17 AEROSOL, METERED RESPIRATORY (INHALATION) at 18:43

## 2021-06-30 RX ADMIN — FLUTICASONE PROPIONATE AND SALMETEROL XINAFOATE SCH PUFF: 115; 21 AEROSOL, METERED RESPIRATORY (INHALATION) at 14:16

## 2021-06-30 RX ADMIN — PANTOPRAZOLE SODIUM SCH MG: 40 INJECTION, POWDER, FOR SOLUTION INTRAVENOUS at 08:13

## 2021-06-30 RX ADMIN — MINERAL OIL AND WHITE PETROLATUM SCH APPLIC: 150; 830 OINTMENT OPHTHALMIC at 20:01

## 2021-06-30 RX ADMIN — ALBUTEROL SULFATE SCH GM: 90 AEROSOL, METERED RESPIRATORY (INHALATION) at 01:55

## 2021-06-30 RX ADMIN — VANCOMYCIN HYDROCHLORIDE SCH MLS/HR: 500 INJECTION, POWDER, LYOPHILIZED, FOR SOLUTION INTRAVENOUS at 10:59

## 2021-06-30 RX ADMIN — IPRATROPIUM BROMIDE SCH PUFF: 17 AEROSOL, METERED RESPIRATORY (INHALATION) at 14:16

## 2021-06-30 RX ADMIN — ALBUTEROL SULFATE SCH GM: 90 AEROSOL, METERED RESPIRATORY (INHALATION) at 14:15

## 2021-06-30 RX ADMIN — FUROSEMIDE SCH MG: 10 INJECTION, SOLUTION INTRAVENOUS at 17:43

## 2021-06-30 RX ADMIN — ENOXAPARIN SODIUM SCH MG: 100 INJECTION SUBCUTANEOUS at 08:12

## 2021-06-30 RX ADMIN — PROPOFOL SCH MLS/HR: 10 INJECTION, EMULSION INTRAVENOUS at 02:34

## 2021-06-30 RX ADMIN — MAGNESIUM SULFATE IN DEXTROSE SCH MLS/HR: 10 INJECTION, SOLUTION INTRAVENOUS at 02:58

## 2021-06-30 RX ADMIN — Medication SCH MLS/HR: at 01:57

## 2021-06-30 RX ADMIN — PROPOFOL SCH MLS/HR: 10 INJECTION, EMULSION INTRAVENOUS at 17:45

## 2021-06-30 RX ADMIN — ENOXAPARIN SODIUM SCH MG: 100 INJECTION SUBCUTANEOUS at 20:01

## 2021-06-30 RX ADMIN — DOCUSATE SODIUM AND SENNOSIDES SCH EA: 8.6; 5 TABLET, FILM COATED ORAL at 20:01

## 2021-06-30 RX ADMIN — DEXTROSE MONOHYDRATE SCH MLS/HR: 5 INJECTION, SOLUTION INTRAVENOUS at 20:57

## 2021-06-30 RX ADMIN — Medication SCH MLS/HR: at 08:49

## 2021-06-30 RX ADMIN — DEXTROSE MONOHYDRATE SCH MLS/HR: 5 INJECTION, SOLUTION INTRAVENOUS at 08:49

## 2021-06-30 RX ADMIN — POTASSIUM CHLORIDE SCH MEQ: 1500 TABLET, EXTENDED RELEASE ORAL at 02:58

## 2021-06-30 RX ADMIN — Medication SCH MLS/HR: at 15:27

## 2021-06-30 RX ADMIN — INSULIN ASPART SCH UNIT: 100 INJECTION, SOLUTION INTRAVENOUS; SUBCUTANEOUS at 12:09

## 2021-06-30 RX ADMIN — SODIUM CHLORIDE SCH MLS/HR: 900 INJECTION INTRAVENOUS at 08:13

## 2021-06-30 RX ADMIN — ALBUTEROL SULFATE SCH GM: 90 AEROSOL, METERED RESPIRATORY (INHALATION) at 18:43

## 2021-06-30 RX ADMIN — PROPOFOL SCH MLS/HR: 10 INJECTION, EMULSION INTRAVENOUS at 02:35

## 2021-06-30 RX ADMIN — DOCUSATE SODIUM AND SENNOSIDES SCH EA: 8.6; 5 TABLET, FILM COATED ORAL at 08:13

## 2021-06-30 RX ADMIN — INSULIN ASPART SCH UNIT: 100 INJECTION, SOLUTION INTRAVENOUS; SUBCUTANEOUS at 18:38

## 2021-06-30 RX ADMIN — VANCOMYCIN HYDROCHLORIDE SCH MLS/HR: 500 INJECTION, POWDER, LYOPHILIZED, FOR SOLUTION INTRAVENOUS at 18:38

## 2021-06-30 RX ADMIN — IBUPROFEN PRN MG: 600 TABLET ORAL at 05:12

## 2021-06-30 RX ADMIN — SODIUM CHLORIDE SCH MLS/HR: 900 INJECTION INTRAVENOUS at 01:57

## 2021-06-30 RX ADMIN — Medication SCH MLS/HR: at 14:05

## 2021-06-30 RX ADMIN — ALBUTEROL SULFATE SCH GM: 90 AEROSOL, METERED RESPIRATORY (INHALATION) at 06:38

## 2021-06-30 RX ADMIN — FLUTICASONE PROPIONATE AND SALMETEROL XINAFOATE SCH PUFF: 115; 21 AEROSOL, METERED RESPIRATORY (INHALATION) at 18:44

## 2021-06-30 NOTE — TELE-ICU PROGRESS NOTE
Subjective


Date Seen by a Provider:  2021


Time Seen by a Provider:  10:14





Sepsis Event


Evaluation


Height, Weight, BMI


Height: 5'11"


Weight: 305lbs. oz. 138.761727qk; 39.92 BMI


Method:Stated





Exam


Exam


Patient acknowledged, consented, and participated in this virtual visit which 

was conducted using real time audio/video


Vital Signs








  Date Time  Temp Pulse Resp B/P (MAP) Pulse Ox O2 Delivery O2 Flow Rate FiO2


 


21 10:00 37.8 80 26 133/68 (89) 93 Mechanical Ventilator 100.00 


 


21 09:00 38.0 80 26 122/67 (85) 91 Mechanical Ventilator 100.00 


 


21 08:49  78 26 111/66    


 


21 08:00 38.2 91 29 115/64 (81) 86 Mechanical Ventilator 100.00 


 


21 07:00 38.1 85 26 120/64 (82) 88 Mechanical Ventilator 100.00 


 


21 06:47  86      


 


21 06:38  79 26  89   100


 


21 06:00 38.3 89 26 110/58 (75) 89 Mechanical Ventilator 100.00 


 


21 05:12 38.3       


 


21 05:00 38.3 79 26 112/59 (76) 89 Mechanical Ventilator 100.00 


 


21 04:00 38.2 86 26 118/61 (80) 89 Mechanical Ventilator 100.00 


 


21 04:00     92 Mechanical Ventilator  100


 


21 03:00 38.0 85 26 141/69 (93) 92 Mechanical Ventilator 100.00 


 


21 02:35  87  128/64    


 


21 02:34  87  128/64    


 


21 02:00 37.9 80 17 124/58 (80) 91 Mechanical Ventilator 100.00 


 


21 01:57  82      


 


21 01:51  72 26  91   100


 


21 01:00 37.9 72 26 105/64 (78) 91 Mechanical Ventilator 100.00 


 


21 01:00  72      


 


21 00:00 37.8 78 26 94/58 (70) 89 Mechanical Ventilator 100.00 


 


21 23:45 37.7     Mechanical Ventilator 100.00 


 


21 23:45     91 Mechanical Ventilator  100


 


21 23:00 37.7 80 26 116/62 (80) 91 Mechanical Ventilator 100.00 


 


21 22:40  75 26 104/57    


 


21 22:00 37.7 76 26 138/65 (89) 92 Mechanical Ventilator 100.00 


 


21 21:00 37.7 77 26 99/56 (70) 92 Mechanical Ventilator 100.00 


 


21 21:00  78 26  92   100


 


21 20:41  81  106/59    


 


21 20:41  81  106/59    


 


21 20:29 37.6       


 


21 20:00 37.6 80 26 154/72 (99) 95 Mechanical Ventilator 100.00 


 


21 20:00     92 Mechanical Ventilator  100


 


21 19:00  84      


 


21 19:00  84 29 102/58 (73) 92 Mechanical Ventilator 100.00 


 


21 19:00 37.7     Mechanical Ventilator 100.00 


 


21 18:28  80 26  90   100


 


21 18:00 37.2 82 39 126/59 (81) 85 Mechanical Ventilator 100.00 


 


21 17:00 37.1 74 26 109/58 (75) 93 Mechanical Ventilator 100.00 


 


21 16:00     91 Mechanical Ventilator  100


 


21 16:00 37.0 67 26 108/58 (75) 93 Mechanical Ventilator 100.00 


 


21 15:25    109/53    


 


21 15:25    109/53    


 


21 15:00 37.0 63 26 91/57 (68) 94 Mechanical Ventilator 100.00 


 


21 14:29  64 26  93   100


 


21 14:00 36.9 63 26 94/55 (68) 93 Mechanical Ventilator 100.00 


 


21 13:00 36.9 62 26 93/55 (68) 93 Mechanical Ventilator 100.00 


 


21 13:00  62      


 


21 12:53 37.0 63 26 85/53 (64) 92 Mechanical Ventilator 100.00 


 


21 12:00     91 Mechanical Ventilator  100


 


21 12:00 37.0 63 26 85/53 (64) 92 Mechanical Ventilator 100.00 


 


21 11:00 36.9 64 26 91/53 (66) 93 Mechanical Ventilator 100.00 


 


21 10:17    102/70    


 


21 10:15    102/70    














I & O 


 


 21





 07:00


 


Intake Total 2480 ml


 


Output Total 5210 ml


 


Balance -2730 ml








Height & Weight


Height: 5'11"


Weight: 305lbs. oz. 138.020695lk; 39.92 BMI


Method:Stated


General Appearance:  No Apparent Distress, WD/WN, Chronically ill, Obese, Other 

(Sedated and intubated)


HEENT:  PERRL/EOMI, Normal ENT Inspection, Pharynx Normal, Moist Mucous 

Membranes


Neck:  Full Range of Motion, Normal Inspection, Non Tender


Respiratory:  Lungs Clear, Decreased Breath Sounds


Cardiovascular:  Regular Rate, Rhythm


Capillary Refill:  Less Than 3 Seconds


Peripheral Pulses:  2+ Dorsalis Pedis (R), 2+ Left Dors-Pedis (L), 2+ Radial 

Pulses (R)


Gastrointestinal:  normal bowel sounds, non tender, soft


Extremity:  Other


Neurologic/Psychiatric:  Alert, Oriented x3, No Motor/Sensory Deficits, Normal 

Mood/Affect


Skin:  Normal Color, Warm/Dry


Lymphatic:  No Adenopathy





Results


Lab


Laboratory Tests


21 02:50








21 18:15








21 02:25











Assessment/Plan


Assessment/Plan


(Tele-ICU Physician , Progress Note ) 





Available chart/ vitals / labs / Images reviewed 


Video assessment done using  teleICU camera, rest of exam as per RN 


 can tot reach  RN for discussion with multiple attempts 





Events overnight :  was not prone


hemodynamically stable, pressors + 





I/O = neg 2200


Drips:  [id number]  ativan gtt,  fentanyl  ,  norepi 0.02





Consultants:  





CXR r  eport reviewed.has bilateral opacities


 -   increased RLL density - atelectais ?


6/3- = samw 





blood cx  - neg


sputum - usual geno ,  = staph aureus


port  staph coag  neg


Urine  - staph coag neg





Hospital course: 


=41 y/o with Hx of DM2, possible MARILY, non compliant with meds. Came in for 

COVID-19


-ICU for increased WOB and worsening hypoxia. Was on high flow @ 40 kpm with

FiO2 100%, now back on BiPAP  12/8 FiO2 90%.


- BiPAP  15/8 FiO2 100%. rr 28 tv 800, MV 28L= > intubated 


  - shock , started on levo 


) Full DNR, maintain current level of care.


() 0300, Did not tolerate un-proning to supine, iv push paralytic, vent parveen

nges. Temp is 103


 -  hold proning , vent changed ,  push paralytics prn


 - copious secretion  on succtioning 





VENT SETTINGS. PC  30 ,100 % peep 18 ,rr 26  MV 20  PAP 52


ABG  reviewed 





SBT - not candidate today 


 vital /Cardiovascular Stability/Sedation Score/FI02/PEEP/ABG/CXR reviewed . 





A/P 


Acute reps failure - , hypoxic , Covid PNA


- full vent support , PC  30 ,100 % peep 18 ,rr 26  MV 20  PAP 52  - will 

decrease rr 


- holding prove on now , no improvement with diuresis -  stable CR - will cont 

diresis  ( 20 LB  merritt  gain  since admissimn ) 


=== prn paralisis diuresis  today follow closely 


===  increase br - dilators for micous plugging  - ? mucomist next 





try to wean off propogol with increased sedation 








COVID19


 Remdesivir 


 Decadron PO 6 - to IV  , increased dose 


 ddimet 0.5    -> lovenox proph dose 





Shock 


- on levo o.02 





DM II 


- levemir 





Leukocytosis/ fever


- vanco   persistent fever and ? wbc = add merem  


PICC line left :line _ staph hominis - PICC changed  


check amulase - ? pancreatitis 


Hypernatremia - resolved , anticipate ?NA with diuresis - will  start D5 w 





possible MARILY





Lines :    PICC  left  - out  . RIGHT picc  , (Central Line 

Necessity Reviewed) - 


Balderas: 


O/22


Nutrition:  TF while supine 


Analgesia:  fentanyl prn 





Anxiety/ delirium  = propofol , intubated  = AAO 





VTE Prophylaxis:  Lov 40 q12


Stress Ulcer Prophylaxis:  PPI


Glycemic Control:  +





Plans in collaboration with  bedside consultants and IM MDs. 


RN to reach out if any questions or concerns 





A total of  35 minutes of critical care time was devoted to this patient today, 

required to treat and/or prevent further deterioration of critical care 

condition ( as above ) .











CALVIN VASQUEZ MD         2021 10:14

## 2021-06-30 NOTE — DIAGNOSTIC IMAGING REPORT
INDICATION: Hypoxia.



TIME OF EXAM: 2:23 AM



Correlation is made with prior chest from one day earlier.



FINDINGS: ET tube has tip above the agustin. NG tube passes below

the diaphragm. Right-sided line has tip overlying SVC right

atrial junction. Extensive bilateral pulmonary infiltrates

persist. There is no effusion or pneumothorax.



IMPRESSION: Stable bilateral infiltrates when compared with

examination one day earlier.



Dictated by: 



  Dictated on workstation # [id number]

## 2021-06-30 NOTE — PHYSICAL THERAPY PROGRESS NOTE
Therapy Progress Note


Patient intubated and sedated this a.m.  PT will continue to monitor patient 

status and initiate treatment when patient is able to actively participate with 

skilled therapy.











MIGUEL ORLANDO PT                Jun 30, 2021 08:01

## 2021-06-30 NOTE — PROGRESS NOTE - HOSPITALIST
Subjective


HPI/CC On Admission


Date Seen by Provider:  Jun 30, 2021


Time Seen by Provider:  09:30


Chief complaint: Shortness of breath due to Covid





History of present illness: This is a 40-year-old white male diabetic noncom

pliant with diabetic medication who does not use oxygen or CPAP machine but 

appears to be high risk for CPAP due to neck circumference and BMI of 40 who 

presented to the ER with shortness of breath was diagnosed with Covid and he is 

hypoxic.  Patient has progressed throughout the day and at 2100 hrs. the 

decision was made to transfer up to the ICU for pulmonary critical care 

management due to maxed on Vapotherm likely will need BiPAP and possible 

intubation.  His blood sugars remain in the 300s with aggressive insulin regimen

trying to decrease the level.  He does not currently smoke or drink alcohol any 

works for [employer].  He has not been vaccinated against COVID-19.


Subjective/Events-last exam


Pt has become even more complex


WBC 29,000


Fever persists


Eraxis placed empirically 


Vancomycin and Meropenem maintained 


Prognosis guarded 


On 100% FiO2 and Peep of 18





Objective


Exam


Vital Signs





Vital Signs








  Date Time  Temp Pulse Resp B/P (MAP) Pulse Ox O2 Delivery O2 Flow Rate FiO2


 


7/1/21 04:00 37.2 109 26 119/54 (75) 86 Mechanical Ventilator 100.00 


 


7/1/21 03:25        100





Capillary Refill : Less Than 3 Seconds


General Appearance:  No Apparent Distress, WD/WN, Chronically ill, Obese, Other 

(Intubated and sedated)


Respiratory:  No Accessory Muscle Use, No Respiratory Distress, Decreased Breath

Sounds


Cardiovascular:  Regular Rate, Rhythm





Results/Procedures


Lab


Laboratory Tests


7/1/21 02:19








Patient resulted labs reviewed.





Assessment/Plan


Assessment and Plan


Assess & Plan/Chief Complaint


Assessment:


Ventilator dependent respiratory failure


Acute hypoxic respiratory failure from COVID-19 pneumonia requiring intubation 

since failed Vapotherm and BiPAP


Diabetes out-of-control


Obesity


Suspicion of obstructive sleep apnea


Bacteremia with staph replaced PICC line





Plan:


Supportive care


Prognosis guarded





6/29/2021:


Complex issues


Poor prognosis


Appreciate eICU vent management


Change out PICC line due to bacteremia and culture tip





6/30/2021:


Add Eraxis


Monitor fever


Maintain vancomycin and meropenem





Critical Care


Ventilator Management





Diagnosis/Problems


Diagnosis/Problems





(1) COVID-19


Status:  Acute


(2) Hypoxia


Status:  Acute











CECELIA BROOKS DO                Jun 30, 2021 06:35

## 2021-06-30 NOTE — OCC THERAPY PROGRESS NOTE
Therapy Progress Note


Pt is currently intubated.  OT will continue to monitor pt status and initiate 

treatment when pt is medically stable and able to actively participate with 

skilled therapy.











MAURA YIN               Jun 30, 2021 06:43

## 2021-07-01 VITALS — DIASTOLIC BLOOD PRESSURE: 52 MMHG | SYSTOLIC BLOOD PRESSURE: 127 MMHG

## 2021-07-01 VITALS — SYSTOLIC BLOOD PRESSURE: 106 MMHG | DIASTOLIC BLOOD PRESSURE: 64 MMHG

## 2021-07-01 VITALS — SYSTOLIC BLOOD PRESSURE: 139 MMHG | DIASTOLIC BLOOD PRESSURE: 64 MMHG

## 2021-07-01 VITALS — SYSTOLIC BLOOD PRESSURE: 119 MMHG | DIASTOLIC BLOOD PRESSURE: 54 MMHG

## 2021-07-01 VITALS — SYSTOLIC BLOOD PRESSURE: 112 MMHG | DIASTOLIC BLOOD PRESSURE: 68 MMHG

## 2021-07-01 VITALS — DIASTOLIC BLOOD PRESSURE: 69 MMHG | SYSTOLIC BLOOD PRESSURE: 150 MMHG

## 2021-07-01 VITALS — DIASTOLIC BLOOD PRESSURE: 63 MMHG | SYSTOLIC BLOOD PRESSURE: 98 MMHG

## 2021-07-01 VITALS — SYSTOLIC BLOOD PRESSURE: 132 MMHG | DIASTOLIC BLOOD PRESSURE: 57 MMHG

## 2021-07-01 VITALS — SYSTOLIC BLOOD PRESSURE: 130 MMHG | DIASTOLIC BLOOD PRESSURE: 71 MMHG

## 2021-07-01 VITALS — SYSTOLIC BLOOD PRESSURE: 113 MMHG | DIASTOLIC BLOOD PRESSURE: 46 MMHG

## 2021-07-01 VITALS — DIASTOLIC BLOOD PRESSURE: 65 MMHG | SYSTOLIC BLOOD PRESSURE: 98 MMHG

## 2021-07-01 VITALS — DIASTOLIC BLOOD PRESSURE: 69 MMHG | SYSTOLIC BLOOD PRESSURE: 124 MMHG

## 2021-07-01 VITALS — SYSTOLIC BLOOD PRESSURE: 101 MMHG | DIASTOLIC BLOOD PRESSURE: 69 MMHG

## 2021-07-01 VITALS — DIASTOLIC BLOOD PRESSURE: 61 MMHG | SYSTOLIC BLOOD PRESSURE: 98 MMHG

## 2021-07-01 VITALS — DIASTOLIC BLOOD PRESSURE: 68 MMHG | SYSTOLIC BLOOD PRESSURE: 103 MMHG

## 2021-07-01 VITALS — DIASTOLIC BLOOD PRESSURE: 60 MMHG | SYSTOLIC BLOOD PRESSURE: 88 MMHG

## 2021-07-01 VITALS — SYSTOLIC BLOOD PRESSURE: 106 MMHG | DIASTOLIC BLOOD PRESSURE: 67 MMHG

## 2021-07-01 VITALS — DIASTOLIC BLOOD PRESSURE: 73 MMHG | SYSTOLIC BLOOD PRESSURE: 143 MMHG

## 2021-07-01 VITALS — SYSTOLIC BLOOD PRESSURE: 150 MMHG | DIASTOLIC BLOOD PRESSURE: 69 MMHG

## 2021-07-01 VITALS — DIASTOLIC BLOOD PRESSURE: 69 MMHG | SYSTOLIC BLOOD PRESSURE: 148 MMHG

## 2021-07-01 VITALS — DIASTOLIC BLOOD PRESSURE: 62 MMHG | SYSTOLIC BLOOD PRESSURE: 111 MMHG

## 2021-07-01 VITALS — SYSTOLIC BLOOD PRESSURE: 104 MMHG | DIASTOLIC BLOOD PRESSURE: 52 MMHG

## 2021-07-01 VITALS — SYSTOLIC BLOOD PRESSURE: 100 MMHG | DIASTOLIC BLOOD PRESSURE: 64 MMHG

## 2021-07-01 VITALS — DIASTOLIC BLOOD PRESSURE: 64 MMHG | SYSTOLIC BLOOD PRESSURE: 96 MMHG

## 2021-07-01 VITALS — DIASTOLIC BLOOD PRESSURE: 71 MMHG | SYSTOLIC BLOOD PRESSURE: 126 MMHG

## 2021-07-01 VITALS — SYSTOLIC BLOOD PRESSURE: 103 MMHG | DIASTOLIC BLOOD PRESSURE: 66 MMHG

## 2021-07-01 VITALS — SYSTOLIC BLOOD PRESSURE: 124 MMHG | DIASTOLIC BLOOD PRESSURE: 69 MMHG

## 2021-07-01 VITALS — DIASTOLIC BLOOD PRESSURE: 67 MMHG | SYSTOLIC BLOOD PRESSURE: 146 MMHG

## 2021-07-01 VITALS — DIASTOLIC BLOOD PRESSURE: 62 MMHG | SYSTOLIC BLOOD PRESSURE: 92 MMHG

## 2021-07-01 LAB
ARTERIAL PATENCY WRIST A: (no result)
BASE EXCESS STD BLDA CALC-SCNC: 2.5 MMOL/L (ref -2.5–2.5)
BASOPHILS # BLD AUTO: 0.2 10^3/UL (ref 0–0.1)
BASOPHILS NFR BLD AUTO: 1 % (ref 0–10)
BDY SITE: (no result)
BODY TEMPERATURE: 38
BUN/CREAT SERPL: 23
CALCIUM SERPL-MCNC: 7.6 MG/DL (ref 8.5–10.1)
CHLORIDE SERPL-SCNC: 106 MMOL/L (ref 98–107)
CO2 BLDA CALC-SCNC: 28.1 MMOL/L (ref 21–31)
CO2 SERPL-SCNC: 24 MMOL/L (ref 21–32)
CREAT SERPL-MCNC: 1.11 MG/DL (ref 0.6–1.3)
EOSINOPHIL # BLD AUTO: 0.3 10^3/UL (ref 0–0.3)
EOSINOPHIL NFR BLD AUTO: 1 % (ref 0–10)
GFR SERPLBLD BASED ON 1.73 SQ M-ARVRAT: > 60 ML/MIN
GLUCOSE SERPL-MCNC: 252 MG/DL (ref 70–105)
HCT VFR BLD CALC: 35 % (ref 40–54)
HGB BLD-MCNC: 11.6 G/DL (ref 13.3–17.7)
LYMPHOCYTES # BLD AUTO: 2.6 10^3/UL (ref 1–4)
LYMPHOCYTES NFR BLD AUTO: 8 % (ref 12–44)
MAGNESIUM SERPL-MCNC: 2 MG/DL (ref 1.6–2.4)
MANUAL DIFFERENTIAL PERFORMED BLD QL: NO
MCH RBC QN AUTO: 28 PG (ref 25–34)
MCHC RBC AUTO-ENTMCNC: 33 G/DL (ref 32–36)
MCV RBC AUTO: 86 FL (ref 80–99)
MONOCYTES # BLD AUTO: 1.3 10^3/UL (ref 0–1)
MONOCYTES NFR BLD AUTO: 4 % (ref 0–12)
NEUTROPHILS # BLD AUTO: 24.4 10^3/UL (ref 1.8–7.8)
NEUTROPHILS NFR BLD AUTO: 80 % (ref 42–75)
PCO2 BLDA: 46 MMHG (ref 35–45)
PH BLDA: 7.39 [PH] (ref 7.37–7.43)
PHOSPHATE SERPL-MCNC: 3.3 MG/DL (ref 2.3–4.7)
PLATELET # BLD: 524 10^3/UL (ref 130–400)
PMV BLD AUTO: 10.4 FL (ref 9–12.2)
PO2 BLDA: 54 MMHG (ref 79–93)
POTASSIUM SERPL-SCNC: 3.5 MMOL/L (ref 3.6–5)
SAO2 % BLDA FROM PO2: 78 % (ref 94–100)
SODIUM SERPL-SCNC: 141 MMOL/L (ref 135–145)
TRIGL SERPL-MCNC: 328 MG/DL (ref ?–150)
VENTILATION MODE VENT: YES
WBC # BLD AUTO: 30.6 10^3/UL (ref 4.3–11)

## 2021-07-01 RX ADMIN — INSULIN ASPART SCH UNIT: 100 INJECTION, SOLUTION INTRAVENOUS; SUBCUTANEOUS at 17:50

## 2021-07-01 RX ADMIN — DEXTROSE MONOHYDRATE SCH MLS/HR: 5 INJECTION, SOLUTION INTRAVENOUS at 09:56

## 2021-07-01 RX ADMIN — FLUTICASONE PROPIONATE AND SALMETEROL XINAFOATE SCH PUFF: 115; 21 AEROSOL, METERED RESPIRATORY (INHALATION) at 18:39

## 2021-07-01 RX ADMIN — VANCOMYCIN HYDROCHLORIDE SCH MLS/HR: 500 INJECTION, POWDER, LYOPHILIZED, FOR SOLUTION INTRAVENOUS at 01:37

## 2021-07-01 RX ADMIN — FLUTICASONE PROPIONATE AND SALMETEROL XINAFOATE SCH PUFF: 115; 21 AEROSOL, METERED RESPIRATORY (INHALATION) at 06:47

## 2021-07-01 RX ADMIN — PROPOFOL SCH MLS/HR: 10 INJECTION, EMULSION INTRAVENOUS at 16:04

## 2021-07-01 RX ADMIN — CISATRACURIUM BESYLATE SCH MLS/HR: 2 INJECTION INTRAVENOUS at 14:05

## 2021-07-01 RX ADMIN — ENOXAPARIN SODIUM SCH MG: 100 INJECTION SUBCUTANEOUS at 08:10

## 2021-07-01 RX ADMIN — INSULIN ASPART SCH UNIT: 100 INJECTION, SOLUTION INTRAVENOUS; SUBCUTANEOUS at 23:59

## 2021-07-01 RX ADMIN — DEXTROSE MONOHYDRATE SCH MLS/HR: 5 INJECTION, SOLUTION INTRAVENOUS at 21:03

## 2021-07-01 RX ADMIN — IPRATROPIUM BROMIDE SCH PUFF: 17 AEROSOL, METERED RESPIRATORY (INHALATION) at 18:39

## 2021-07-01 RX ADMIN — PANTOPRAZOLE SODIUM SCH MG: 40 INJECTION, POWDER, FOR SOLUTION INTRAVENOUS at 08:11

## 2021-07-01 RX ADMIN — FUROSEMIDE SCH MG: 10 INJECTION, SOLUTION INTRAVENOUS at 08:11

## 2021-07-01 RX ADMIN — ENOXAPARIN SODIUM SCH MG: 100 INJECTION SUBCUTANEOUS at 20:17

## 2021-07-01 RX ADMIN — SIMVASTATIN SCH MG: 10 TABLET, FILM COATED ORAL at 20:17

## 2021-07-01 RX ADMIN — IPRATROPIUM BROMIDE SCH PUFF: 17 AEROSOL, METERED RESPIRATORY (INHALATION) at 14:18

## 2021-07-01 RX ADMIN — ALBUTEROL SULFATE SCH GM: 90 AEROSOL, METERED RESPIRATORY (INHALATION) at 14:18

## 2021-07-01 RX ADMIN — IBUPROFEN PRN MG: 600 TABLET ORAL at 14:05

## 2021-07-01 RX ADMIN — POTASSIUM CHLORIDE SCH MLS/HR: 200 INJECTION, SOLUTION INTRAVENOUS at 03:01

## 2021-07-01 RX ADMIN — MINERAL OIL AND WHITE PETROLATUM SCH APPLIC: 150; 830 OINTMENT OPHTHALMIC at 20:17

## 2021-07-01 RX ADMIN — IBUPROFEN PRN MG: 600 TABLET ORAL at 23:59

## 2021-07-01 RX ADMIN — INSULIN ASPART SCH UNIT: 100 INJECTION, SOLUTION INTRAVENOUS; SUBCUTANEOUS at 12:44

## 2021-07-01 RX ADMIN — INSULIN ASPART SCH UNIT: 100 INJECTION, SOLUTION INTRAVENOUS; SUBCUTANEOUS at 05:14

## 2021-07-01 RX ADMIN — POTASSIUM CHLORIDE SCH MEQ: 1500 TABLET, EXTENDED RELEASE ORAL at 03:01

## 2021-07-01 RX ADMIN — PROPOFOL SCH MLS/HR: 10 INJECTION, EMULSION INTRAVENOUS at 05:59

## 2021-07-01 RX ADMIN — CISATRACURIUM BESYLATE SCH MLS/HR: 2 INJECTION INTRAVENOUS at 23:22

## 2021-07-01 RX ADMIN — SODIUM CHLORIDE SCH MLS/HR: 900 INJECTION INTRAVENOUS at 20:18

## 2021-07-01 RX ADMIN — PROPOFOL SCH MLS/HR: 10 INJECTION, EMULSION INTRAVENOUS at 06:00

## 2021-07-01 RX ADMIN — ACETAMINOPHEN PRN MG: 325 TABLET ORAL at 01:37

## 2021-07-01 RX ADMIN — ALBUTEROL SULFATE SCH GM: 90 AEROSOL, METERED RESPIRATORY (INHALATION) at 10:39

## 2021-07-01 RX ADMIN — MAGNESIUM SULFATE IN DEXTROSE SCH MLS/HR: 10 INJECTION, SOLUTION INTRAVENOUS at 03:01

## 2021-07-01 RX ADMIN — VANCOMYCIN HYDROCHLORIDE SCH MLS/HR: 500 INJECTION, POWDER, LYOPHILIZED, FOR SOLUTION INTRAVENOUS at 09:48

## 2021-07-01 RX ADMIN — PROPOFOL SCH MLS/HR: 10 INJECTION, EMULSION INTRAVENOUS at 00:26

## 2021-07-01 RX ADMIN — IBUPROFEN PRN MG: 600 TABLET ORAL at 00:52

## 2021-07-01 RX ADMIN — SODIUM CHLORIDE SCH MLS/HR: 900 INJECTION INTRAVENOUS at 01:37

## 2021-07-01 RX ADMIN — Medication SCH MLS/HR: at 20:15

## 2021-07-01 RX ADMIN — Medication SCH MLS/HR: at 01:51

## 2021-07-01 RX ADMIN — CISATRACURIUM BESYLATE SCH MLS/HR: 2 INJECTION INTRAVENOUS at 11:14

## 2021-07-01 RX ADMIN — IPRATROPIUM BROMIDE SCH PUFF: 17 AEROSOL, METERED RESPIRATORY (INHALATION) at 10:39

## 2021-07-01 RX ADMIN — PROPOFOL SCH MLS/HR: 10 INJECTION, EMULSION INTRAVENOUS at 11:13

## 2021-07-01 RX ADMIN — CISATRACURIUM BESYLATE SCH MLS/HR: 2 INJECTION INTRAVENOUS at 08:36

## 2021-07-01 RX ADMIN — PROPOFOL SCH MLS/HR: 10 INJECTION, EMULSION INTRAVENOUS at 00:25

## 2021-07-01 RX ADMIN — ACETAMINOPHEN PRN MG: 325 TABLET ORAL at 14:05

## 2021-07-01 RX ADMIN — Medication SCH MLS/HR: at 01:37

## 2021-07-01 RX ADMIN — ACETAMINOPHEN PRN MG: 325 TABLET ORAL at 18:03

## 2021-07-01 RX ADMIN — IPRATROPIUM BROMIDE SCH PUFF: 17 AEROSOL, METERED RESPIRATORY (INHALATION) at 06:47

## 2021-07-01 RX ADMIN — MINERAL OIL AND WHITE PETROLATUM SCH APPLIC: 150; 830 OINTMENT OPHTHALMIC at 08:10

## 2021-07-01 RX ADMIN — ALBUTEROL SULFATE SCH GM: 90 AEROSOL, METERED RESPIRATORY (INHALATION) at 06:46

## 2021-07-01 RX ADMIN — ALBUTEROL SULFATE SCH GM: 90 AEROSOL, METERED RESPIRATORY (INHALATION) at 02:22

## 2021-07-01 RX ADMIN — SODIUM CHLORIDE SCH MLS/HR: 900 INJECTION, SOLUTION INTRAVENOUS at 14:04

## 2021-07-01 RX ADMIN — PROPOFOL SCH MLS/HR: 10 INJECTION, EMULSION INTRAVENOUS at 16:03

## 2021-07-01 RX ADMIN — ALBUTEROL SULFATE SCH GM: 90 AEROSOL, METERED RESPIRATORY (INHALATION) at 18:39

## 2021-07-01 RX ADMIN — CISATRACURIUM BESYLATE SCH MLS/HR: 2 INJECTION INTRAVENOUS at 04:23

## 2021-07-01 RX ADMIN — Medication SCH MLS/HR: at 08:12

## 2021-07-01 RX ADMIN — Medication SCH MLS/HR: at 16:01

## 2021-07-01 RX ADMIN — ALBUTEROL SULFATE SCH GM: 90 AEROSOL, METERED RESPIRATORY (INHALATION) at 22:08

## 2021-07-01 RX ADMIN — DEXTROSE MONOHYDRATE SCH MLS/HR: 5 INJECTION, SOLUTION INTRAVENOUS at 08:10

## 2021-07-01 RX ADMIN — DOCUSATE SODIUM AND SENNOSIDES SCH EA: 8.6; 5 TABLET, FILM COATED ORAL at 08:11

## 2021-07-01 RX ADMIN — DEXTROSE MONOHYDRATE SCH MLS/HR: 5 INJECTION, SOLUTION INTRAVENOUS at 16:03

## 2021-07-01 RX ADMIN — SODIUM CHLORIDE SCH MLS/HR: 900 INJECTION INTRAVENOUS at 08:11

## 2021-07-01 RX ADMIN — POTASSIUM CHLORIDE SCH MLS/HR: 200 INJECTION, SOLUTION INTRAVENOUS at 03:14

## 2021-07-01 RX ADMIN — Medication SCH MLS/HR: at 14:05

## 2021-07-01 RX ADMIN — CISATRACURIUM BESYLATE SCH MLS/HR: 2 INJECTION INTRAVENOUS at 19:00

## 2021-07-01 RX ADMIN — VANCOMYCIN HYDROCHLORIDE SCH MLS/HR: 500 INJECTION, POWDER, LYOPHILIZED, FOR SOLUTION INTRAVENOUS at 17:49

## 2021-07-01 RX ADMIN — SODIUM CHLORIDE SCH MLS/HR: 900 INJECTION INTRAVENOUS at 14:05

## 2021-07-01 RX ADMIN — DOCUSATE SODIUM AND SENNOSIDES SCH EA: 8.6; 5 TABLET, FILM COATED ORAL at 20:17

## 2021-07-01 NOTE — PROGRESS NOTE - HOSPITALIST
Subjective


HPI/CC On Admission


Date Seen by Provider:  Jul 1, 2021


Time Seen by Provider:  10:00


Chief complaint: Shortness of breath due to Covid





History of present illness: This is a 40-year-old white male diabetic noncomp

liant with diabetic medication who does not use oxygen or CPAP machine but 

appears to be high risk for CPAP due to neck circumference and BMI of 40 who 

presented to the ER with shortness of breath was diagnosed with Covid and he is 

hypoxic.  Patient has progressed throughout the day and at 2100 hrs. the 

decision was made to transfer up to the ICU for pulmonary critical care 

management due to maxed on Vapotherm likely will need BiPAP and possible 

intubation.  His blood sugars remain in the 300s with aggressive insulin regimen

trying to decrease the level.  He does not currently smoke or drink alcohol any 

works for [employer].  He has not been vaccinated against COVID-19.


Subjective/Events-last exam


Pt overall not doing well


WC 30,000


EICU has reviewed the chart multiple times and no more can be done for the 

patient 


Girlfriend will hopefully visit today and we will need to work on getting him on

comfort care and probably pass away shortly after.





Objective


Exam


Vital Signs





Vital Signs








  Date Time  Temp Pulse Resp B/P (MAP) Pulse Ox O2 Delivery O2 Flow Rate FiO2


 


7/2/21 04:53  118 24 105/62    


 


7/2/21 04:00 38.4    88 Mechanical Ventilator 100.00 


 


7/2/21 01:57        100





Capillary Refill : Less Than 3 Seconds


General Appearance:  No Apparent Distress, WD/WN, Chronically ill, Obese, Other 

(Sedated on vent)


Respiratory:  Lungs Clear, Decreased Breath Sounds


Cardiovascular:  Regular Rate, Rhythm





Results/Procedures


Lab


Laboratory Tests


7/2/21 02:56








Patient resulted labs reviewed.





Assessment/Plan


Assessment and Plan


Assess & Plan/Chief Complaint


Assessment:


Ventilator dependent respiratory failure


Acute hypoxic respiratory failure from COVID-19 pneumonia requiring intubation 

since failed Vapotherm and BiPAP


Diabetes out-of-control


Obesity


Suspicion of obstructive sleep apnea


Bacteremia with staph replaced PICC line





Plan:


Supportive care


Prognosis guarded





6/29/2021:


Complex issues


Poor prognosis


Appreciate eICU vent management


Change out PICC line due to bacteremia and culture tip





6/30/2021:


Add Eraxis


Monitor fever


Maintain vancomycin and meropenem





7/1/2021:


Continue aggressive care


Poor prognosis





Critical Care


Ventilator Management





Diagnosis/Problems


Diagnosis/Problems





(1) COVID-19


Status:  Acute


(2) Hypoxia


Status:  Acute











CECELIA BROOKS DO                 Jul 1, 2021 06:09

## 2021-07-01 NOTE — TELE-ICU PROGRESS NOTE
Subjective


Date Seen by a Provider:  2021


Time Seen by a Provider:  10:09





Sepsis Event


Evaluation


Height, Weight, BMI


Height: 5'11"


Weight: 305lbs. oz. 138.097795is; 39.92 BMI


Method:Stated





Exam


Exam


Patient acknowledged, consented, and participated in this virtual visit which 

was conducted using real time audio/video


Vital Signs








  Date Time  Temp Pulse Resp B/P (MAP) Pulse Ox O2 Delivery O2 Flow Rate FiO2


 


21 10:00 36.7 115 17 143/73 (96) 87 Mechanical Ventilator 100.00 


 


21 09:56  115 27 139/71    


 


21 09:00 36.6 113 24 150/69 (96) 85 Mechanical Ventilator 100.00 


 


21 08:00 36.6 113 24 150/69 (96) 85 Mechanical Ventilator 100.00 


 


21 07:40     85 Mechanical Ventilator  100


 


21 07:00  114      


 


21 07:00 36.6 112 24 148/69 (95) 85 Mechanical Ventilator 100.00 


 


21 06:47  112 24  85   100


 


21 06:00 36.7 114 24 139/64 (89) 86 Mechanical Ventilator 100.00 


 


21 06:00  113  143/65    


 


21 05:59  113  143/65    


 


21 05:00 36.9 112 29 132/57 (82) 86 Mechanical Ventilator 100.00 


 


21 04:00 37.2 109 26 119/54 (75) 86 Mechanical Ventilator 100.00 


 


21 03:25     90 Mechanical Ventilator  100


 


21 03:00 37.8 116 25 113/46 (68) 87 Mechanical Ventilator 100.00 


 


21 02:22  114 24  87   100


 


21 02:08 38.3       


 


21 02:00 38.4 101 24 111/62 (78) 88 Mechanical Ventilator 100.00 


 


21 01:37 38.4       


 


21 01:00  94      


 


21 01:00 38.2 102 29 127/52 (77) 89 Mechanical Ventilator 100.00 


 


21 00:52 38.1       


 


21 00:26  86  123/58    


 


21 00:25  86  123/58    


 


21 00:18  77 24  93   100


 


21 00:00 37.8 84 25 104/52 (69) 91 Mechanical Ventilator 100.00 


 


21 23:10     90 Mechanical Ventilator  100


 


21 23:00 37.6 76 24 103/58 (73) 90 Mechanical Ventilator 100.00 


 


21 23:00 37.7     Mechanical Ventilator 100.00 


 


21 22:00 37.6 75 21 123/64 (83) 90 Mechanical Ventilator 100.00 


 


21 21:31  75 26  90   100


 


21 21:00 37.5 76 24 99/50 (66) 91 Mechanical Ventilator 100.00 


 


21 20:57  74 24 102/52    


 


21 20:00 37.5 74 25 100/57 (71) 90 Mechanical Ventilator 100.00 


 


21 19:30     89 Mechanical Ventilator  100


 


21 19:00  73      


 


21 19:00 37.5 72 25 106/63 (77) 89 Mechanical Ventilator 100.00 


 


21 19:00 37.5  24   Mechanical Ventilator 100.00 


 


21 18:47  74 24  88   100


 


21 18:47  74 24  88   100


 


21 18:44  74 24  88   100


 


21 18:00 37.4 66 24 115/75 (88) 90 Mechanical Ventilator 100.00 


 


21 17:46  66  109/63    


 


21 17:45  66  109/63    


 


21 17:10 37.4       


 


21 17:00 37.4 66 24 108/49 (68) 90 Mechanical Ventilator 100.00 


 


21 16:00     91 Mechanical Ventilator  100


 


21 16:00 37.4 70 24 102/59 (73) 90 Mechanical Ventilator 100.00 


 


21 15:52 37.3       


 


21 15:00 37.4 72 24 106/61 (76) 91 Mechanical Ventilator 100.00 


 


21 14:16  71 24  91   100


 


21 14:03  70 24 100/55    


 


21 14:00 37.6 68 23 95/56 (69) 90 Mechanical Ventilator 100.00 


 


21 13:00 37.6 70 24 111/66 (81) 93 Mechanical Ventilator 100.00 


 


21 12:26  70      


 


21 12:00 37.6 82 24 109/68 (82) 93 Mechanical Ventilator 100.00 


 


21 12:00     91 Mechanical Ventilator  100


 


21 11:00 37.7 79 25 144/65 (91) 90 Mechanical Ventilator 100.00 


 


21 10:35  80 24  89   100














I & O 


 


 21





 07:00


 


Intake Total 4185.0 ml


 


Output Total 4035 ml


 


Balance 150.0 ml








Height & Weight


Height: 5'11"


Weight: 305lbs. oz. 138.328735rz; 39.92 BMI


Method:Stated


General Appearance:  No Apparent Distress, WD/WN, Chronically ill, Obese, Other 

(Intubated and sedated)


HEENT:  PERRL/EOMI, Normal ENT Inspection, Pharynx Normal, Moist Mucous Membrane

s


Neck:  Full Range of Motion, Normal Inspection, Non Tender


Respiratory:  No Accessory Muscle Use, No Respiratory Distress, Decreased Breath

Sounds


Cardiovascular:  Regular Rate, Rhythm


Capillary Refill:  Less Than 3 Seconds


Peripheral Pulses:  2+ Dorsalis Pedis (R), 2+ Left Dors-Pedis (L), 2+ Radial 

Pulses (R)


Gastrointestinal:  normal bowel sounds, non tender, soft


Extremity:  Other


Neurologic/Psychiatric:  Alert, Oriented x3, No Motor/Sensory Deficits, Normal 

Mood/Affect


Skin:  Normal Color, Warm/Dry


Lymphatic:  No Adenopathy





Results


Lab


Laboratory Tests


21 18:15








21 02:25








21 02:19











Assessment/Plan


Assessment/Plan


(Tele-ICU Physician , Progress Note ) 





Available chart/ vitals / labs / Images reviewed 


Video assessment done using  teleICU camera, rest of exam as per RN 


 can tot reach  RN for discussion with multiple attempts 





Events overnight :  was not prone


hemodynamically stable, pressors + 


LESS FEBRILE





I/O = even 


Drips:  [id number]  ativan gtt,  fentanyl  ,  OFF PRESSORS 





Consultants:  





CXR r  eport reviewed.has bilateral opacities


 -   increased RLL density - atelectais ?


6/3- = samw 





blood cx  - neg


sputum - usual geno ,  = staph aureus


port  staph coag  neg


Urine  - staph coag neg


 - blood cx - negative , tipp catheted negative 





Hospital course: 


=39 y/o with Hx of DM2, possible MARILY, non compliant with meds. Came in for 

COVID-19


-ICU for increased WOB and worsening hypoxia. Was on high flow @ 40 kpm with

FiO2 100%, now back on BiPAP  12/8 FiO2 90%.


- BiPAP  15/8 FiO2 100%. rr 28 tv 800, MV 28L= > intubated 


  - shock , started on levo 


) Full DNR, maintain current level of care.


() 0300, Did not tolerate un-proning to supine, iv push paralytic, vent 

changes. Temp is 103


 -  hold proning , vent changed ,  push paralytics prn


 - copious secretion  on succtioning 


 - paralysis 





VENT SETTINGS. PC  30 ,100 % peep 18 ,rr 26  MV 20  PAP 52


ABG  reviewed 





SBT - not candidate today 


 vital /Cardiovascular Stability/Sedation Score/FI02/PEEP/ABG/CXR reviewed . 





A/P 


Acute reps failure - , hypoxic , Covid PNA


- full vent support , PC  30 ,100 % peep 18 ,rr 26  MV 20  PAP 52  - will 

decrease rr 


- holding prove on now , no improvement with diuresis -  stable CR - will cont 

diresis  ( 20 LB  merritt  gain  since admissimn ) 


=== prn paralisis diuresis  today follow closely 


===  increase br - dilators for micous plugging  


-  added paralysis - still hypoxic - will rechec d dimer - ? VTE ? 





try to wean off propofol - increasing ativan gtt 








COVID19


 Remdesivir 


 Decadron PO 6 - to IV  , increased dose -  decrease to 6  starting 


 ddimet 0.5    -> lovenox proph dose 





Shock 


- OFF LEVO 





DM II 


- levemir - mild hy[erglycemia 





Leukocytosis/ fever


- vanco   persistent fever -  will do 5 days , and ? wbc = add merem  


PICC line left :line _ staph hominis - PICC changed  - added eraxis 





( elevater TGL - on ativan gtt with ssome propofol - amylase WNL 





Hypernatremia - resolved , anticipate ?NA with diuresis - will  start D5 w 











Lines :    PICC  left  - out  . RIGHT picc  , (Central Line 

Necessity Reviewed) - 


Balderas: 


O/22


Nutrition:  TF while supine 


Analgesia:  fentanyl prn 





Anxiety/ delirium  = propofol , intubated  = AAO 





VTE Prophylaxis:  Lov 40 q12


Stress Ulcer Prophylaxis:  PPI


Glycemic Control:  +





Plans in collaboration with  bedside consultants and IM MDs. 


RN to reach out if any questions or concerns 





A total of  35 minutes of critical care time was devoted to this patient today, 

required to treat and/or prevent further deterioration of critical care 

condition ( as above ) .











CALVIN VASQUEZ MD          2021 10:09

## 2021-07-01 NOTE — PHYSICAL THERAPY PROGRESS NOTE
Therapy Progress Note


Patient now on a paralytic/intubated.  PT will continue to monitor patient 

status.











LORI SY PT               Jul 1, 2021 07:55

## 2021-07-01 NOTE — OCC THERAPY PROGRESS NOTE
Therapy Progress Note


Pt is currently intubated.  OT will continue to monitor pt status and initiate 

treatment when pt is medically stable and able to actively participate with 

skilled therapy.











MAURA YIN                Jul 1, 2021 06:52

## 2021-07-01 NOTE — DIAGNOSTIC IMAGING REPORT
INDICATION: Respiratory distress. Hypoxia. COVID  positive



Comparison with 06/30/2021. ET tube and NG tube remain present.

There continues to be bilateral diffuse alveolar infiltrates with

dense consolidation on the left. Heart is upper limits of normal.



IMPRESSION: No significant overall change with  dense bilateral

consolidated infiltrates more severe on the left.



Report was faxed to [facility] by lamin at 7:23AM.



Dictated by: 



  Dictated on workstation # [id number]

## 2021-07-02 VITALS — DIASTOLIC BLOOD PRESSURE: 68 MMHG | SYSTOLIC BLOOD PRESSURE: 116 MMHG

## 2021-07-02 VITALS — SYSTOLIC BLOOD PRESSURE: 109 MMHG | DIASTOLIC BLOOD PRESSURE: 62 MMHG

## 2021-07-02 VITALS — SYSTOLIC BLOOD PRESSURE: 116 MMHG | DIASTOLIC BLOOD PRESSURE: 60 MMHG

## 2021-07-02 VITALS — SYSTOLIC BLOOD PRESSURE: 104 MMHG | DIASTOLIC BLOOD PRESSURE: 65 MMHG

## 2021-07-02 VITALS — DIASTOLIC BLOOD PRESSURE: 71 MMHG | SYSTOLIC BLOOD PRESSURE: 118 MMHG

## 2021-07-02 VITALS — SYSTOLIC BLOOD PRESSURE: 100 MMHG | DIASTOLIC BLOOD PRESSURE: 63 MMHG

## 2021-07-02 VITALS — DIASTOLIC BLOOD PRESSURE: 59 MMHG | SYSTOLIC BLOOD PRESSURE: 88 MMHG

## 2021-07-02 VITALS — DIASTOLIC BLOOD PRESSURE: 61 MMHG | SYSTOLIC BLOOD PRESSURE: 93 MMHG

## 2021-07-02 VITALS — SYSTOLIC BLOOD PRESSURE: 126 MMHG | DIASTOLIC BLOOD PRESSURE: 74 MMHG

## 2021-07-02 VITALS — SYSTOLIC BLOOD PRESSURE: 113 MMHG | DIASTOLIC BLOOD PRESSURE: 59 MMHG

## 2021-07-02 VITALS — SYSTOLIC BLOOD PRESSURE: 88 MMHG | DIASTOLIC BLOOD PRESSURE: 57 MMHG

## 2021-07-02 VITALS — DIASTOLIC BLOOD PRESSURE: 71 MMHG | SYSTOLIC BLOOD PRESSURE: 120 MMHG

## 2021-07-02 VITALS — SYSTOLIC BLOOD PRESSURE: 105 MMHG | DIASTOLIC BLOOD PRESSURE: 60 MMHG

## 2021-07-02 VITALS — SYSTOLIC BLOOD PRESSURE: 91 MMHG | DIASTOLIC BLOOD PRESSURE: 63 MMHG

## 2021-07-02 VITALS — DIASTOLIC BLOOD PRESSURE: 59 MMHG | SYSTOLIC BLOOD PRESSURE: 87 MMHG

## 2021-07-02 VITALS — DIASTOLIC BLOOD PRESSURE: 62 MMHG | SYSTOLIC BLOOD PRESSURE: 103 MMHG

## 2021-07-02 VITALS — DIASTOLIC BLOOD PRESSURE: 73 MMHG | SYSTOLIC BLOOD PRESSURE: 124 MMHG

## 2021-07-02 VITALS — DIASTOLIC BLOOD PRESSURE: 72 MMHG | SYSTOLIC BLOOD PRESSURE: 104 MMHG

## 2021-07-02 VITALS — SYSTOLIC BLOOD PRESSURE: 94 MMHG | DIASTOLIC BLOOD PRESSURE: 62 MMHG

## 2021-07-02 VITALS — DIASTOLIC BLOOD PRESSURE: 64 MMHG | SYSTOLIC BLOOD PRESSURE: 127 MMHG

## 2021-07-02 VITALS — SYSTOLIC BLOOD PRESSURE: 81 MMHG | DIASTOLIC BLOOD PRESSURE: 57 MMHG

## 2021-07-02 VITALS — DIASTOLIC BLOOD PRESSURE: 73 MMHG | SYSTOLIC BLOOD PRESSURE: 98 MMHG

## 2021-07-02 VITALS — DIASTOLIC BLOOD PRESSURE: 59 MMHG | SYSTOLIC BLOOD PRESSURE: 84 MMHG

## 2021-07-02 VITALS — SYSTOLIC BLOOD PRESSURE: 101 MMHG | DIASTOLIC BLOOD PRESSURE: 61 MMHG

## 2021-07-02 VITALS — DIASTOLIC BLOOD PRESSURE: 55 MMHG | SYSTOLIC BLOOD PRESSURE: 79 MMHG

## 2021-07-02 VITALS — DIASTOLIC BLOOD PRESSURE: 71 MMHG | SYSTOLIC BLOOD PRESSURE: 97 MMHG

## 2021-07-02 VITALS — SYSTOLIC BLOOD PRESSURE: 109 MMHG | DIASTOLIC BLOOD PRESSURE: 69 MMHG

## 2021-07-02 VITALS — SYSTOLIC BLOOD PRESSURE: 85 MMHG | DIASTOLIC BLOOD PRESSURE: 93 MMHG

## 2021-07-02 VITALS — SYSTOLIC BLOOD PRESSURE: 79 MMHG | DIASTOLIC BLOOD PRESSURE: 55 MMHG

## 2021-07-02 LAB
ARTERIAL PATENCY WRIST A: (no result)
ARTERIAL PATENCY WRIST A: (no result)
BASE EXCESS STD BLDA CALC-SCNC: -3.6 MMOL/L (ref -2.5–2.5)
BASE EXCESS STD BLDA CALC-SCNC: -7.4 MMOL/L (ref -2.5–2.5)
BASOPHILS # BLD AUTO: 0.2 10^3/UL (ref 0–0.1)
BASOPHILS NFR BLD AUTO: 1 % (ref 0–10)
BDY SITE: (no result)
BDY SITE: (no result)
BODY TEMPERATURE: 37
BODY TEMPERATURE: 38.4
BUN/CREAT SERPL: 18
CALCIUM SERPL-MCNC: 7.5 MG/DL (ref 8.5–10.1)
CHLORIDE SERPL-SCNC: 105 MMOL/L (ref 98–107)
CO2 BLDA CALC-SCNC: 22.1 MMOL/L (ref 21–31)
CO2 BLDA CALC-SCNC: 25.2 MMOL/L (ref 21–31)
CO2 SERPL-SCNC: 20 MMOL/L (ref 21–32)
CREAT SERPL-MCNC: 2.55 MG/DL (ref 0.6–1.3)
EOSINOPHIL # BLD AUTO: 0.2 10^3/UL (ref 0–0.3)
EOSINOPHIL NFR BLD AUTO: 1 % (ref 0–10)
EOSINOPHIL NFR BLD MANUAL: 2 %
GFR SERPLBLD BASED ON 1.73 SQ M-ARVRAT: 28 ML/MIN
GLUCOSE SERPL-MCNC: 266 MG/DL (ref 70–105)
HCT VFR BLD CALC: 35 % (ref 40–54)
HGB BLD-MCNC: 11.1 G/DL (ref 13.3–17.7)
INHALED O2 FLOW RATE: (no result) L/MIN
INHALED O2 FLOW RATE: (no result) L/MIN
LYMPHOCYTES # BLD AUTO: 1.4 10^3/UL (ref 1–4)
LYMPHOCYTES NFR BLD AUTO: 6 % (ref 12–44)
MAGNESIUM SERPL-MCNC: 2.3 MG/DL (ref 1.6–2.4)
MCH RBC QN AUTO: 28 PG (ref 25–34)
MCHC RBC AUTO-ENTMCNC: 32 G/DL (ref 32–36)
MCV RBC AUTO: 87 FL (ref 80–99)
MONOCYTES # BLD AUTO: 1.4 10^3/UL (ref 0–1)
MONOCYTES NFR BLD AUTO: 6 % (ref 0–12)
MONOCYTES NFR BLD: 7 %
NEUTROPHILS # BLD AUTO: 18 10^3/UL (ref 1.8–7.8)
NEUTROPHILS NFR BLD AUTO: 79 % (ref 42–75)
NEUTS BAND NFR BLD MANUAL: 79 %
NEUTS BAND NFR BLD: 1 %
PCO2 BLDA: 62 MMHG (ref 35–45)
PCO2 BLDA: 66 MMHG (ref 35–45)
PH BLDA: 7.14 [PH] (ref 7.37–7.43)
PH BLDA: 7.18 [PH] (ref 7.37–7.43)
PHOSPHATE SERPL-MCNC: 6.8 MG/DL (ref 2.3–4.7)
PLATELET # BLD: 365 10^3/UL (ref 130–400)
PMV BLD AUTO: 10.3 FL (ref 9–12.2)
PO2 BLDA: 59 MMHG (ref 79–93)
PO2 BLDA: 73 MMHG (ref 79–93)
POLYCHROMASIA BLD QL SMEAR: SLIGHT
POTASSIUM SERPL-SCNC: 4.9 MMOL/L (ref 3.6–5)
SAO2 % BLDA FROM PO2: 80 % (ref 94–100)
SAO2 % BLDA FROM PO2: 88 % (ref 94–100)
SODIUM SERPL-SCNC: 138 MMOL/L (ref 135–145)
VARIANT LYMPHS NFR BLD MANUAL: 10 %
VENTILATION MODE VENT: YES
VENTILATION MODE VENT: YES
WBC # BLD AUTO: 22.8 10^3/UL (ref 4.3–11)

## 2021-07-02 RX ADMIN — ENOXAPARIN SODIUM SCH MG: 100 INJECTION SUBCUTANEOUS at 08:17

## 2021-07-02 RX ADMIN — CISATRACURIUM BESYLATE SCH MLS/HR: 2 INJECTION INTRAVENOUS at 17:20

## 2021-07-02 RX ADMIN — PROPOFOL SCH MLS/HR: 10 INJECTION, EMULSION INTRAVENOUS at 01:07

## 2021-07-02 RX ADMIN — CISATRACURIUM BESYLATE SCH MLS/HR: 2 INJECTION INTRAVENOUS at 02:22

## 2021-07-02 RX ADMIN — Medication SCH MLS/HR: at 13:52

## 2021-07-02 RX ADMIN — Medication SCH MLS/HR: at 02:21

## 2021-07-02 RX ADMIN — MAGNESIUM SULFATE IN DEXTROSE SCH MLS/HR: 10 INJECTION, SOLUTION INTRAVENOUS at 05:48

## 2021-07-02 RX ADMIN — ACETAMINOPHEN PRN MG: 325 TABLET ORAL at 17:19

## 2021-07-02 RX ADMIN — PROPOFOL SCH MLS/HR: 10 INJECTION, EMULSION INTRAVENOUS at 00:48

## 2021-07-02 RX ADMIN — SODIUM CHLORIDE SCH MLS/HR: 900 INJECTION INTRAVENOUS at 02:05

## 2021-07-02 RX ADMIN — DEXTROSE MONOHYDRATE SCH MLS/HR: 5 INJECTION, SOLUTION INTRAVENOUS at 15:56

## 2021-07-02 RX ADMIN — PANTOPRAZOLE SODIUM SCH MG: 40 INJECTION, POWDER, FOR SOLUTION INTRAVENOUS at 08:16

## 2021-07-02 RX ADMIN — IPRATROPIUM BROMIDE SCH PUFF: 17 AEROSOL, METERED RESPIRATORY (INHALATION) at 18:13

## 2021-07-02 RX ADMIN — FLUTICASONE PROPIONATE AND SALMETEROL XINAFOATE SCH PUFF: 115; 21 AEROSOL, METERED RESPIRATORY (INHALATION) at 18:13

## 2021-07-02 RX ADMIN — IPRATROPIUM BROMIDE SCH PUFF: 17 AEROSOL, METERED RESPIRATORY (INHALATION) at 06:43

## 2021-07-02 RX ADMIN — CISATRACURIUM BESYLATE SCH MLS/HR: 2 INJECTION INTRAVENOUS at 12:15

## 2021-07-02 RX ADMIN — SODIUM CHLORIDE SCH MLS/HR: 900 INJECTION, SOLUTION INTRAVENOUS at 18:40

## 2021-07-02 RX ADMIN — ACETAMINOPHEN PRN MG: 325 TABLET ORAL at 22:14

## 2021-07-02 RX ADMIN — Medication SCH MLS/HR: at 18:22

## 2021-07-02 RX ADMIN — CISATRACURIUM BESYLATE SCH MLS/HR: 2 INJECTION INTRAVENOUS at 23:08

## 2021-07-02 RX ADMIN — DEXTROSE MONOHYDRATE SCH MLS/HR: 5 INJECTION, SOLUTION INTRAVENOUS at 20:22

## 2021-07-02 RX ADMIN — SIMVASTATIN SCH MG: 10 TABLET, FILM COATED ORAL at 20:18

## 2021-07-02 RX ADMIN — CISATRACURIUM BESYLATE SCH MLS/HR: 2 INJECTION INTRAVENOUS at 06:01

## 2021-07-02 RX ADMIN — CISATRACURIUM BESYLATE SCH MLS/HR: 2 INJECTION INTRAVENOUS at 20:21

## 2021-07-02 RX ADMIN — FLUTICASONE PROPIONATE AND SALMETEROL XINAFOATE SCH PUFF: 115; 21 AEROSOL, METERED RESPIRATORY (INHALATION) at 06:43

## 2021-07-02 RX ADMIN — DEXTROSE MONOHYDRATE SCH MLS/HR: 5 INJECTION, SOLUTION INTRAVENOUS at 08:18

## 2021-07-02 RX ADMIN — MINERAL OIL AND WHITE PETROLATUM SCH APPLIC: 150; 830 OINTMENT OPHTHALMIC at 08:17

## 2021-07-02 RX ADMIN — FUROSEMIDE SCH MG: 10 INJECTION, SOLUTION INTRAVENOUS at 08:16

## 2021-07-02 RX ADMIN — PROPOFOL SCH MLS/HR: 10 INJECTION, EMULSION INTRAVENOUS at 13:51

## 2021-07-02 RX ADMIN — DEXTROSE MONOHYDRATE SCH MLS/HR: 5 INJECTION, SOLUTION INTRAVENOUS at 08:19

## 2021-07-02 RX ADMIN — POTASSIUM CHLORIDE SCH MEQ: 1500 TABLET, EXTENDED RELEASE ORAL at 05:48

## 2021-07-02 RX ADMIN — INSULIN ASPART SCH UNIT: 100 INJECTION, SOLUTION INTRAVENOUS; SUBCUTANEOUS at 04:54

## 2021-07-02 RX ADMIN — ACETAMINOPHEN PRN MG: 325 TABLET ORAL at 02:05

## 2021-07-02 RX ADMIN — PROPOFOL SCH MLS/HR: 10 INJECTION, EMULSION INTRAVENOUS at 13:52

## 2021-07-02 RX ADMIN — ALBUTEROL SULFATE SCH GM: 90 AEROSOL, METERED RESPIRATORY (INHALATION) at 10:42

## 2021-07-02 RX ADMIN — DEXTROSE MONOHYDRATE SCH MLS/HR: 5 INJECTION, SOLUTION INTRAVENOUS at 04:53

## 2021-07-02 RX ADMIN — ACETAMINOPHEN PRN MG: 325 TABLET ORAL at 11:01

## 2021-07-02 RX ADMIN — Medication SCH MLS/HR: at 22:06

## 2021-07-02 RX ADMIN — Medication SCH MLS/HR: at 22:05

## 2021-07-02 RX ADMIN — DEXTROSE MONOHYDRATE SCH MLS/HR: 5 INJECTION, SOLUTION INTRAVENOUS at 23:08

## 2021-07-02 RX ADMIN — MINERAL OIL AND WHITE PETROLATUM SCH APPLIC: 150; 830 OINTMENT OPHTHALMIC at 20:19

## 2021-07-02 RX ADMIN — DOCUSATE SODIUM AND SENNOSIDES SCH EA: 8.6; 5 TABLET, FILM COATED ORAL at 08:17

## 2021-07-02 RX ADMIN — ALBUTEROL SULFATE SCH GM: 90 AEROSOL, METERED RESPIRATORY (INHALATION) at 13:23

## 2021-07-02 RX ADMIN — ALBUTEROL SULFATE SCH GM: 90 AEROSOL, METERED RESPIRATORY (INHALATION) at 06:42

## 2021-07-02 RX ADMIN — SODIUM CHLORIDE SCH MLS/HR: 900 INJECTION, SOLUTION INTRAVENOUS at 13:52

## 2021-07-02 RX ADMIN — ALBUTEROL SULFATE SCH GM: 90 AEROSOL, METERED RESPIRATORY (INHALATION) at 18:13

## 2021-07-02 RX ADMIN — IBUPROFEN PRN MG: 600 TABLET ORAL at 11:01

## 2021-07-02 RX ADMIN — Medication SCH MLS/HR: at 02:04

## 2021-07-02 RX ADMIN — VANCOMYCIN HYDROCHLORIDE SCH MLS/HR: 500 INJECTION, POWDER, LYOPHILIZED, FOR SOLUTION INTRAVENOUS at 12:20

## 2021-07-02 RX ADMIN — POTASSIUM CHLORIDE SCH MLS/HR: 200 INJECTION, SOLUTION INTRAVENOUS at 05:48

## 2021-07-02 RX ADMIN — INSULIN ASPART SCH UNIT: 100 INJECTION, SOLUTION INTRAVENOUS; SUBCUTANEOUS at 18:21

## 2021-07-02 RX ADMIN — SODIUM CHLORIDE SCH MLS/HR: 900 INJECTION INTRAVENOUS at 15:19

## 2021-07-02 RX ADMIN — IPRATROPIUM BROMIDE SCH PUFF: 17 AEROSOL, METERED RESPIRATORY (INHALATION) at 13:23

## 2021-07-02 RX ADMIN — IPRATROPIUM BROMIDE SCH PUFF: 17 AEROSOL, METERED RESPIRATORY (INHALATION) at 10:42

## 2021-07-02 RX ADMIN — INSULIN ASPART SCH UNIT: 100 INJECTION, SOLUTION INTRAVENOUS; SUBCUTANEOUS at 12:18

## 2021-07-02 RX ADMIN — SODIUM CHLORIDE SCH MLS/HR: 900 INJECTION INTRAVENOUS at 08:16

## 2021-07-02 RX ADMIN — SODIUM CHLORIDE SCH MLS/HR: 900 INJECTION INTRAVENOUS at 20:18

## 2021-07-02 RX ADMIN — ENOXAPARIN SODIUM SCH MG: 100 INJECTION SUBCUTANEOUS at 20:19

## 2021-07-02 RX ADMIN — Medication SCH MLS/HR: at 18:39

## 2021-07-02 RX ADMIN — DEXTROSE MONOHYDRATE SCH MLS/HR: 5 INJECTION, SOLUTION INTRAVENOUS at 01:02

## 2021-07-02 RX ADMIN — CISATRACURIUM BESYLATE SCH MLS/HR: 2 INJECTION INTRAVENOUS at 18:39

## 2021-07-02 RX ADMIN — ALBUTEROL SULFATE SCH GM: 90 AEROSOL, METERED RESPIRATORY (INHALATION) at 21:30

## 2021-07-02 RX ADMIN — DOCUSATE SODIUM AND SENNOSIDES SCH EA: 8.6; 5 TABLET, FILM COATED ORAL at 22:13

## 2021-07-02 RX ADMIN — VANCOMYCIN HYDROCHLORIDE SCH MLS/HR: 500 INJECTION, POWDER, LYOPHILIZED, FOR SOLUTION INTRAVENOUS at 00:49

## 2021-07-02 RX ADMIN — DEXTROSE MONOHYDRATE SCH MLS/HR: 5 INJECTION, SOLUTION INTRAVENOUS at 17:20

## 2021-07-02 RX ADMIN — Medication SCH MLS/HR: at 08:16

## 2021-07-02 RX ADMIN — DEXTROSE MONOHYDRATE SCH MLS/HR: 5 INJECTION, SOLUTION INTRAVENOUS at 12:15

## 2021-07-02 RX ADMIN — ALBUTEROL SULFATE SCH GM: 90 AEROSOL, METERED RESPIRATORY (INHALATION) at 01:57

## 2021-07-02 NOTE — PROGRESS NOTE - HOSPITALIST
Subjective


HPI/CC On Admission


Date Seen by Provider:  Jul 2, 2021


Time Seen by Provider:  09:30


Chief complaint: Shortness of breath due to Covid





History of present illness: This is a 40-year-old white male diabetic noncomp

liant with diabetic medication who does not use oxygen or CPAP machine but 

appears to be high risk for CPAP due to neck circumference and BMI of 40 who 

presented to the ER with shortness of breath was diagnosed with Covid and he is 

hypoxic.  Patient has progressed throughout the day and at 2100 hrs. the 

decision was made to transfer up to the ICU for pulmonary critical care 

management due to maxed on Vapotherm likely will need BiPAP and possible 

intubation.  His blood sugars remain in the 300s with aggressive insulin regimen

trying to decrease the level.  He does not currently smoke or drink alcohol any 

works for [employer].  He has not been vaccinated against COVID-19.


Subjective/Events-last exam


Patient about the same


Labs remain abnormal


Updated family on long conversation including girlfriend and sister who is a vet

in [place]


Nothing else can be done





Objective


Exam


Vital Signs





Vital Signs








  Date Time  Temp Pulse Resp B/P (MAP) Pulse Ox O2 Delivery O2 Flow Rate FiO2


 


7/3/21 06:00 39.4 126 24 92/62 (72) 86 Mechanical Ventilator 100.00 


 


7/3/21 02:03        100





Capillary Refill : Less Than 3 Seconds


General Appearance:  No Apparent Distress, WD/WN, Chronically ill, Other 

(Sedated on vent)


Respiratory:  Lungs Clear, Decreased Breath Sounds


Cardiovascular:  Regular Rate, Rhythm





Results/Procedures


Lab


Laboratory Tests


7/3/21 04:30








Patient resulted labs reviewed.





Assessment/Plan


Assessment and Plan


Assess & Plan/Chief Complaint


Assessment:


Ventilator dependent respiratory failure


Acute hypoxic respiratory failure from COVID-19 pneumonia requiring intubation 

since failed Vapotherm and BiPAP


Diabetes out-of-control


Obesity


Suspicion of obstructive sleep apnea


Bacteremia with staph replaced PICC line





Plan:


Supportive care


Prognosis guarded





6/29/2021:


Complex issues


Poor prognosis


Appreciate eICU vent management


Change out PICC line due to bacteremia and culture tip





6/30/2021:


Add Eraxis


Monitor fever


Maintain vancomycin and meropenem





7/1/2021:


Continue aggressive care


Poor prognosis





7/2/2021:


Updated family


Getting close to futility





Critical Care


Ventilator Management





Diagnosis/Problems


Diagnosis/Problems





(1) COVID-19


Status:  Acute


(2) Hypoxia


Status:  Acute











CECELIA BROOKS DO                 Jul 2, 2021 09:35

## 2021-07-02 NOTE — DIAGNOSTIC IMAGING REPORT
EXAMINATION: Chest 1 view



HISTORY: COVID positive, ventilated



COMPARISON: 07/01/2021



FINDINGS: 



Heart size and pulmonary vasculature are stable. Stable patchy

interstitial and airspace opacities seen throughout both lungs.

Medical support lines and tubes are unchanged. No pneumothorax.

The osseous structures are intact.



IMPRESSION: 



1. Stable patchy interstitial and airspace opacities throughout

both lungs, not significantly changed from 07/01/2021.



Dictated by: 



  Dictated on workstation # [id number]

## 2021-07-02 NOTE — TELE-ICU PROGRESS NOTE
Subjective


Date Seen by a Provider:  2021


Time Seen by a Provider:  08:43


Subjective/Events-last exam


COVID, Now off pressors, sedation is Propofol on 20, [TG's were up at 400], On 

IV Ativan at 6, On Nimbex, TOF is 4/4 on 1 mcg/kg/min, yesterday was 2/4


PCRV 24/Vt 400/FiO2 100% PEEP 20


Pe[place] Paw 40's, 


Has PICC line, previous one which had staph hominis is out


PICC line site ok, placed  


Family informed of poor prognosis


temp remains up at 101.5





Sepsis Event


Evaluation


Height, Weight, BMI


Height: 5'11"


Weight: 305lbs. oz. 138.827018al; 39.92 BMI


Method:Stated





Exam


Exam


Patient acknowledged, consented, and participated in this virtual visit which 

was conducted using real time audio/video


Vital Signs








  Date Time  Temp Pulse Resp B/P (MAP) Pulse Ox O2 Delivery O2 Flow Rate FiO2


 


21 08:19  124 24 107/65    


 


21 08:00 38.3 122 24 127/64 (85) 90 Mechanical Ventilator 100.00 


 


21 07:00  121      


 


21 07:00 38.2 121 24 103/62 (76) 90 Mechanical Ventilator 100.00 


 


21 06:43  121 24  89   100


 


21 06:00 38.2 120 24 113/59 (77) 89 Mechanical Ventilator 100.00 


 


21 05:00 38.2 118 24 116/60 (78) 89 Mechanical Ventilator 100.00 


 


21 04:53  118 24 105/62    


 


21 04:00 38.4 111 24 94/62 (73) 88 Mechanical Ventilator 100.00 


 


21 04:00     89 Mechanical Ventilator  100


 


21 03:00 38.3 110 24 104/65 (78) 89 Mechanical Ventilator 100.00 


 


21 02:35 38.3       


 


21 02:21  106  103/75    


 


21 02:05 38.4       


 


21 02:00 38.4 105 24 98/73 (81) 89 Mechanical Ventilator 100.00 


 


21 01:57  105 24  87   100


 


21 01:07  108  105/74    


 


21 01:02  108 24 105/76    


 


21 01:00  110      


 


21 01:00 38.6 107 24 104/72 (83) 88 Mechanical Ventilator 100.00 


 


21 00:48  108  116/68    


 


21 00:00 38.6 108 32 124/73 (90) 87 Mechanical Ventilator 100.00 


 


21 23:59     90 Mechanical Ventilator  100


 


21 23:59 38.6       


 


21 23:00 38.7 109 24 124/69 (87) 87 Mechanical Ventilator 100.00 


 


21 22:48  108  130/78    


 


21 22:08  108 24  88   100


 


21 22:00 38.7 106 24 112/68 (83) 87 Mechanical Ventilator 100.00 


 


21 21:47     90 Mechanical Ventilator  100


 


21 21:03  103 24 103/64    


 


21 21:00 38.7 103 24 106/67 (80) 90 Mechanical Ventilator 100.00 


 


21 20:00 38.7 103 24 98/63 (75) 92 Mechanical Ventilator 100.00 


 


21 20:00     93 Mechanical Ventilator  100


 


21 19:00  100      


 


21 19:00 38.6 101 24 96/64 (75) 92 Mechanical Ventilator 100.00 


 


21 19:00 38.6       


 


21 18:39  101 24  92   95


 


21 18:03 38.7       


 


21 18:00 38.7 103 16 88/60 (69) 93 Mechanical Ventilator 100.00 


 


21 17:00 38.6 112 24 98/61 (73) 93 Mechanical Ventilator 100.00 


 


21 16:04  114  98/62    


 


21 16:03  114 24 92/65    


 


21 16:03  114  98/62    


 


21 16:02 38.5       


 


21 16:00     93 Mechanical Ventilator  100


 


21 16:00 38.5 115 24 100/64 (76) 92 Mechanical Ventilator 100.00 


 


21 15:00 38.4 118 24 103/66 (78) 92 Mechanical Ventilator 100.00 


 


21 14:18  118 24  92   95


 


21 14:05 38.1       


 


21 14:05 38.1       


 


21 14:00 38.0 118 24 98/65 (76) 93 Mechanical Ventilator 100.00 


 


21 13:00  117      


 


21 13:00 37.5 114 24 103/68 (80) 93 Mechanical Ventilator 100.00 


 


21 12:00 37.1 109 24 106/64 (78) 89 Mechanical Ventilator 100.00 


 


21 12:00     91 Mechanical Ventilator  100


 


21 11:13  112  121/65    


 


21 11:13  112  121/65    


 


21 11:00 36.8 115 24 126/71 (89) 86 Mechanical Ventilator 100.00 


 


21 10:39  115 24  87   100


 


21 10:00 36.7 115 17 143/73 (96) 87 Mechanical Ventilator 100.00 


 


21 09:56  115 27 139/71    


 


21 09:00 36.6 113 24 150/69 (96) 85 Mechanical Ventilator 100.00 














I & O 


 


 21





 07:00


 


Intake Total 1470 ml


 


Output Total 2180 ml


 


Balance -710 ml








Height & Weight


Height: 5'11"


Weight: 305lbs. oz. 138.442705fd; 39.92 BMI


Method:Stated


General Appearance:  No Apparent Distress, WD/WN, Chronically ill, Obese, Other 

(Sedated on vent)


HEENT:  PERRL/EOMI, Normal ENT Inspection, Pharynx Normal, Moist Mucous 

Membranes


Neck:  Full Range of Motion, Normal Inspection, Non Tender


Respiratory:  Lungs Clear, Decreased Breath Sounds, Other (left lung has 

crackles, right lung has diminished BS)


Cardiovascular:  Regular Rate, Rhythm, Tachycardia


Capillary Refill:  Less Than 3 Seconds


Peripheral Pulses:  2+ Dorsalis Pedis (R), 2+ Left Dors-Pedis (L), 2+ Radial 

Pulses (R)


Gastrointestinal:  non tender, soft, other (hypoactive bowel sounds)


Extremity:  No Pedal Edema, Slow Capillary Refill, Other


Neurologic/Psychiatric:  Alert, Oriented x3, No Motor/Sensory Deficits, Normal 

Mood/Affect, Other (sedated and paralyzed, )


Skin:  Normal Color, Warm/Dry


Lymphatic:  No Adenopathy





Results


Lab


Laboratory Tests


21 02:19








21 02:56











Assessment/Plan


Assessment/Plan


Tele-ICU Physician , Progress Note )


Available chart/ vitals / labs / Images reviewed


Video assessment done using teleICU camera, rest of exam as per RN


can tot reach RN for discussion with multiple attempts


Events overnight : was not prone,


hemodynamically stable, pressors +


LESS FEBRILE


I/O = even


Drips: [id number] ativan gtt, fentanyl , OFF PRESSORS


Consultants: 


CXR r  eport reviewed.has bilateral opacities


 - increased RLL density - atelectais ?


6/3- = samw


blood cx  - neg


sputum - usual geno ,  = staph aureus


port  staph coag neg


Urine  - staph coag neg


 - blood cx - negative , tipp catheted negative


Hospital course:


=41 y/o with Hx of DM2, possible MARILY, non compliant with meds. Came in for


COVID-19


-ICU for increased WOB and worsening hypoxia. Was on high flow @ 40 kpm with


FiO2 100%, now back on BiPAP 12/8 FiO2 90%.


- BiPAP 15/8 FiO2 100%. rr 28 tv 800, MV 28L= > intubated


 - shock , started on levo


) Full DNR, maintain current level of care.


() 0300, Did not tolerate un-proning to supine, iv push paralytic, vent 

changes. Temp is


103


 - hold proning , vent changed , push paralytics prn


 - copious secretion on succtioning


 - paralysis


VENT SETTINGS. PC 30 ,100 % peep 18 ,rr 26 MV 20 PAP 52


ABG reviewed


SBT - not candidate today


vital /Cardiovascular Stability/Sedation Score/FI02/PEEP/ABG/CXR reviewed .


A/P


Acute reps failure - , hypoxic , Covid PNA


- full vent support , PC 30 ,100 % peep 18 ,rr 26 MV 20 PAP 52 - will decrease 

rr


- holding prove on now , no improvement with diuresis - stable CR - will cont 

diresis ( 20


LB merritt gain since admissimn )


=== prn paralisis diuresis today follow closely


=== increase br - dilators for micous plugging


Page 1 of 3


TINO BOWERS


[facility]


This is a permanent part of the medical record. Do not discard.


2021 10:18


[facility] Physician - Brief Progress Note


 added paralysis - still hypoxic - will rechec d dimer - ? VTE ?


try to wean off propofol - increasing ativan gtt


COVID19


Remdesivir


Decadron PO 6 - to IV  , increased dose - decrease to 6 starting 


ddimet 0.5  -> lovenox proph dose


Shock


- OFF LEVO


DM II


- levemir - mild hy[erglycemia


Leukocytosis/ fever


- vanco  persistent fever - will do 5 days , and ? wbc = add merem 


PICC line left :line _ staph hominis - PICC changed  - added eraxis


( elevater TGL - on ativan gtt with ssome propofol - amylase WNL 


Hypernatremia - resolved , anticipate ?NA with diuresis - will start D5 w


Lines :  PICC left - out  . RIGHT picc  , (Central Line Necessity 

Reviewed)


-


Balderas: 


O/22


Nutrition: TF while supine


Analgesia: fentanyl prn


Anxiety/ delirium = propofol , intubated  = AAO


VTE Prophylaxis: Lov 40 q12


Stress Ulcer Prophylaxis: PPI


Glycemic Control: +


Plans in collaboration with bedside consultants and IM MDs.


RN to reach out if any questions or concerns


A total of 35 minutes of critical care time was devoted to this patient today, 

required to


treat and/or prevent further deterioration of critical care condition ( as above

 ) .


Interventions Major-Respiratory failure - evaluation and management


Intermediate-Diagnostic test evaluation, Infection - evaluation and management,


Medication change / dose adjustment


Minor-Clinical assessment - ordering diagnostic tests


Page 2 of 3


TINO BOWERS


[facility]


This is a permanent part of the medical record. Do not discard.


2021 10:18








21


Doing poorly, family informed, rise in pCO2 probably reflects lack of 

functioning alveoli, will keep on sedation/paralysis-needed due to high Peak Paw

 when off.


continue abx and anti fungal, 


Cr has been rising 2.5, vancomycin level 43, will need to be on hold


Critical Care:  Critically Ill Patient


Time spent with patient (mins):  35











LAVINIA GALINDO MD              2021 08:45

## 2021-07-02 NOTE — TELE-ICU PROGRESS NOTE
Subjective


Time Seen by a Provider:  21:18





Assessment/Plan


Assessment/Plan


Called for worsening hypoxia, 86% on PCRV 24/Vt 400/FiO2 100% PEEP 18





41 yo M with COVID, severe refractory hypoxemic and hypercapnic respiratory 

failure, intubated per record on 6/22, no longer tolerating prone position on 

Nimbex. No ECMO, salvage therapies available. Has been only able to tolerate ?PC

settings. Has DNR in place


Earlier today PEEP was lowered to 18 due to new hypotension and need for 

levophed @ 0.1


RN requesting order to increase PEEP back to 20





Chest exam per bedside unchanged





Recommend repeat ABG, CXR now-- evaluate for pneumothorax or other acute change 

in status


New vent settings adjusted resume prior settings PCRV 24/Vt 520/FiO2 100% PEEP 

20


Critical Care:  Ventilator Management


Time spent with patient (mins):  15





Diagnosis/Problems


Diagnosis/Problems





(1) Hypoxia


Status:  Acute


(2) On mechanically assisted ventilation


Status:  Acute











JOSÉ MOON MD               Jul 2, 2021 21:24

## 2021-07-03 VITALS — SYSTOLIC BLOOD PRESSURE: 79 MMHG | DIASTOLIC BLOOD PRESSURE: 55 MMHG

## 2021-07-03 VITALS — DIASTOLIC BLOOD PRESSURE: 41 MMHG | SYSTOLIC BLOOD PRESSURE: 105 MMHG

## 2021-07-03 VITALS — SYSTOLIC BLOOD PRESSURE: 89 MMHG | DIASTOLIC BLOOD PRESSURE: 44 MMHG

## 2021-07-03 VITALS — DIASTOLIC BLOOD PRESSURE: 65 MMHG | SYSTOLIC BLOOD PRESSURE: 108 MMHG

## 2021-07-03 VITALS — SYSTOLIC BLOOD PRESSURE: 114 MMHG | DIASTOLIC BLOOD PRESSURE: 68 MMHG

## 2021-07-03 VITALS — SYSTOLIC BLOOD PRESSURE: 117 MMHG | DIASTOLIC BLOOD PRESSURE: 71 MMHG

## 2021-07-03 VITALS — DIASTOLIC BLOOD PRESSURE: 71 MMHG | SYSTOLIC BLOOD PRESSURE: 107 MMHG

## 2021-07-03 VITALS — DIASTOLIC BLOOD PRESSURE: 69 MMHG | SYSTOLIC BLOOD PRESSURE: 114 MMHG

## 2021-07-03 VITALS — SYSTOLIC BLOOD PRESSURE: 95 MMHG | DIASTOLIC BLOOD PRESSURE: 57 MMHG

## 2021-07-03 VITALS — SYSTOLIC BLOOD PRESSURE: 96 MMHG | DIASTOLIC BLOOD PRESSURE: 65 MMHG

## 2021-07-03 VITALS — DIASTOLIC BLOOD PRESSURE: 58 MMHG | SYSTOLIC BLOOD PRESSURE: 101 MMHG

## 2021-07-03 VITALS — DIASTOLIC BLOOD PRESSURE: 59 MMHG | SYSTOLIC BLOOD PRESSURE: 100 MMHG

## 2021-07-03 VITALS — DIASTOLIC BLOOD PRESSURE: 63 MMHG | SYSTOLIC BLOOD PRESSURE: 94 MMHG

## 2021-07-03 VITALS — SYSTOLIC BLOOD PRESSURE: 92 MMHG | DIASTOLIC BLOOD PRESSURE: 62 MMHG

## 2021-07-03 LAB
ARTERIAL PATENCY WRIST A: (no result)
ARTERIAL PATENCY WRIST A: (no result)
BASE EXCESS STD BLDA CALC-SCNC: -17.5 MMOL/L (ref -2.5–2.5)
BASE EXCESS STD BLDA CALC-SCNC: -9.8 MMOL/L (ref -2.5–2.5)
BASOPHILS # BLD AUTO: 0.2 10^3/UL (ref 0–0.1)
BASOPHILS NFR BLD AUTO: 0 % (ref 0–10)
BDY SITE: (no result)
BDY SITE: (no result)
BODY TEMPERATURE: 39.5
BODY TEMPERATURE: 39.9
BUN/CREAT SERPL: 15
CALCIUM SERPL-MCNC: 7.4 MG/DL (ref 8.5–10.1)
CHLORIDE SERPL-SCNC: 102 MMOL/L (ref 98–107)
CO2 BLDA CALC-SCNC: 14.5 MMOL/L (ref 21–31)
CO2 BLDA CALC-SCNC: 20.1 MMOL/L (ref 21–31)
CO2 SERPL-SCNC: 14 MMOL/L (ref 21–32)
CREAT SERPL-MCNC: 4.21 MG/DL (ref 0.6–1.3)
EOSINOPHIL # BLD AUTO: 0.2 10^3/UL (ref 0–0.3)
EOSINOPHIL NFR BLD AUTO: 0 % (ref 0–10)
GFR SERPLBLD BASED ON 1.73 SQ M-ARVRAT: 16 ML/MIN
GLUCOSE SERPL-MCNC: 457 MG/DL (ref 70–105)
HCT VFR BLD CALC: 36 % (ref 40–54)
HGB BLD-MCNC: 11.3 G/DL (ref 13.3–17.7)
INHALED O2 FLOW RATE: (no result) L/MIN
LYMPHOCYTES # BLD AUTO: 1.2 10^3/UL (ref 1–4)
LYMPHOCYTES NFR BLD AUTO: 3 % (ref 12–44)
MAGNESIUM SERPL-MCNC: 2.4 MG/DL (ref 1.6–2.4)
MANUAL DIFFERENTIAL PERFORMED BLD QL: YES
MCH RBC QN AUTO: 28 PG (ref 25–34)
MCHC RBC AUTO-ENTMCNC: 32 G/DL (ref 32–36)
MCV RBC AUTO: 88 FL (ref 80–99)
METAMYELOCYTES NFR BLD: 2 %
MONOCYTES # BLD AUTO: 2.3 10^3/UL (ref 0–1)
MONOCYTES NFR BLD AUTO: 5 % (ref 0–12)
MONOCYTES NFR BLD: 3 %
NEUTROPHILS # BLD AUTO: 34.2 10^3/UL (ref 1.8–7.8)
NEUTROPHILS NFR BLD AUTO: 80 % (ref 42–75)
NEUTS BAND NFR BLD MANUAL: 84 %
NEUTS BAND NFR BLD: 5 %
PCO2 BLDA: 69 MMHG (ref 35–45)
PCO2 BLDA: 71 MMHG (ref 35–45)
PH BLDA: 6.91 [PH] (ref 7.37–7.43)
PH BLDA: 7.07 [PH] (ref 7.37–7.43)
PHOSPHATE SERPL-MCNC: 9.3 MG/DL (ref 2.3–4.7)
PLATELET # BLD: 546 10^3/UL (ref 130–400)
PMV BLD AUTO: 10.7 FL (ref 9–12.2)
PO2 BLDA: 70 MMHG (ref 79–93)
PO2 BLDA: 85 MMHG (ref 79–93)
POLYCHROMASIA BLD QL SMEAR: SLIGHT
POTASSIUM SERPL-SCNC: 6.1 MMOL/L (ref 3.6–5)
SAO2 % BLDA FROM PO2: 84 % (ref 94–100)
SAO2 % BLDA FROM PO2: 85 % (ref 94–100)
SODIUM SERPL-SCNC: 135 MMOL/L (ref 135–145)
VARIANT LYMPHS NFR BLD MANUAL: 1 %
VARIANT LYMPHS NFR BLD MANUAL: 5 %
VENTILATION MODE VENT: YES
VENTILATION MODE VENT: YES
WBC # BLD AUTO: 42.7 10^3/UL (ref 4.3–11)

## 2021-07-03 RX ADMIN — DEXTROSE MONOHYDRATE SCH MLS/HR: 5 INJECTION, SOLUTION INTRAVENOUS at 04:20

## 2021-07-03 RX ADMIN — MINERAL OIL AND WHITE PETROLATUM SCH APPLIC: 150; 830 OINTMENT OPHTHALMIC at 08:24

## 2021-07-03 RX ADMIN — IBUPROFEN PRN MG: 600 TABLET ORAL at 02:15

## 2021-07-03 RX ADMIN — CISATRACURIUM BESYLATE SCH MLS/HR: 2 INJECTION INTRAVENOUS at 02:15

## 2021-07-03 RX ADMIN — ALBUTEROL SULFATE SCH GM: 90 AEROSOL, METERED RESPIRATORY (INHALATION) at 02:03

## 2021-07-03 RX ADMIN — PANTOPRAZOLE SODIUM SCH MG: 40 INJECTION, POWDER, FOR SOLUTION INTRAVENOUS at 08:24

## 2021-07-03 RX ADMIN — Medication SCH MLS/HR: at 08:26

## 2021-07-03 RX ADMIN — ALBUTEROL SULFATE SCH GM: 90 AEROSOL, METERED RESPIRATORY (INHALATION) at 07:11

## 2021-07-03 RX ADMIN — DEXTROSE MONOHYDRATE SCH MLS/HR: 5 INJECTION, SOLUTION INTRAVENOUS at 08:57

## 2021-07-03 RX ADMIN — CISATRACURIUM BESYLATE SCH MLS/HR: 2 INJECTION INTRAVENOUS at 08:26

## 2021-07-03 RX ADMIN — Medication SCH MLS/HR: at 06:55

## 2021-07-03 RX ADMIN — FLUTICASONE PROPIONATE AND SALMETEROL XINAFOATE SCH PUFF: 115; 21 AEROSOL, METERED RESPIRATORY (INHALATION) at 07:11

## 2021-07-03 RX ADMIN — IPRATROPIUM BROMIDE SCH PUFF: 17 AEROSOL, METERED RESPIRATORY (INHALATION) at 11:13

## 2021-07-03 RX ADMIN — PROPOFOL SCH MLS/HR: 10 INJECTION, EMULSION INTRAVENOUS at 04:22

## 2021-07-03 RX ADMIN — POTASSIUM CHLORIDE SCH MLS/HR: 200 INJECTION, SOLUTION INTRAVENOUS at 05:58

## 2021-07-03 RX ADMIN — ENOXAPARIN SODIUM SCH MG: 100 INJECTION SUBCUTANEOUS at 08:24

## 2021-07-03 RX ADMIN — POTASSIUM CHLORIDE SCH MEQ: 1500 TABLET, EXTENDED RELEASE ORAL at 05:58

## 2021-07-03 RX ADMIN — CISATRACURIUM BESYLATE SCH MLS/HR: 2 INJECTION INTRAVENOUS at 05:14

## 2021-07-03 RX ADMIN — PROPOFOL SCH MLS/HR: 10 INJECTION, EMULSION INTRAVENOUS at 04:21

## 2021-07-03 RX ADMIN — ALBUTEROL SULFATE SCH GM: 90 AEROSOL, METERED RESPIRATORY (INHALATION) at 11:13

## 2021-07-03 RX ADMIN — SODIUM CHLORIDE SCH MLS/HR: 900 INJECTION, SOLUTION INTRAVENOUS at 10:30

## 2021-07-03 RX ADMIN — Medication SCH MLS/HR: at 06:02

## 2021-07-03 RX ADMIN — IPRATROPIUM BROMIDE SCH PUFF: 17 AEROSOL, METERED RESPIRATORY (INHALATION) at 07:11

## 2021-07-03 RX ADMIN — FUROSEMIDE SCH MG: 10 INJECTION, SOLUTION INTRAVENOUS at 08:23

## 2021-07-03 RX ADMIN — SODIUM CHLORIDE SCH MLS/HR: 900 INJECTION INTRAVENOUS at 02:14

## 2021-07-03 RX ADMIN — SODIUM CHLORIDE SCH MLS/HR: 900 INJECTION INTRAVENOUS at 08:24

## 2021-07-03 RX ADMIN — ACETAMINOPHEN PRN MG: 325 TABLET ORAL at 02:16

## 2021-07-03 RX ADMIN — DEXTROSE MONOHYDRATE SCH MLS/HR: 5 INJECTION, SOLUTION INTRAVENOUS at 00:17

## 2021-07-03 RX ADMIN — DOCUSATE SODIUM AND SENNOSIDES SCH EA: 8.6; 5 TABLET, FILM COATED ORAL at 08:24

## 2021-07-03 RX ADMIN — MAGNESIUM SULFATE IN DEXTROSE SCH MLS/HR: 10 INJECTION, SOLUTION INTRAVENOUS at 05:58

## 2021-07-03 RX ADMIN — Medication SCH MLS/HR: at 02:14

## 2021-07-03 NOTE — DISCHARGE SUMMARY
Discharge Summary


Hospital Course


Problems/Dx:  


(1) COVID-19


Status:  Acute


(2) Hypoxia


Status:  Acute


(3) On mechanically assisted ventilation


Status:  Acute


(4) Acute respiratory failure


Status:  Acute


Qualifiers:  


   Qualified Codes:  J96.01 - Acute respiratory failure with hypoxia


(5) Diabetes mellitus, type 2


Status:  Chronic


Qualifiers:  


   Qualified Codes:  E11.65 - Type 2 diabetes mellitus with hyperglycemia


Hospital Course


Date of Admission: 2021 at 14:00 


Admission Diagnosis :  





Family Physician/Provider: [facility]  





Date of Discharge: 7/3/21 


Discharge Diagnosis: COVID-19 pneumonia fatal, ventilator dependent respiratory 

failure, multisystem organ failure with renal failure, diabetes








Hospital Course:


Patient had a lengthy hospital course for 15 days after he was admitted for COVI

D-19 pneumonia and hypoxia he progressed quickly from nasal cannula to Vapotherm

to BiPAP so was placed in the ICU ultimately intubated due to respiratory 

failure and acidosis.  Had a long course including pneumonia treatment addition 

of Eraxis empirically and patient continued to decline.  I updated the family in

depth and on Saturday he took a turn with complete renal failure with oliguria 

and leukocytosis of 42,000 with acidosis of 7.07 and on max FiO2 100% on 

ventilator he was DNR and girlfriend was able to arrive at the bedside while we 

kept clinical status with pressors and he  quickly.














Labs and Pending Lab Test:


Laboratory Tests


21 11:47: Glucometer 371H


21 17:28: Glucometer 358H


21 23:15: 


Blood Gas Puncture Site R RAD ARTLINE, Blood Gas Patient Temperature 37, 

Arterial Blood pH 7.14*L, Arterial Blood Partial Pressure CO2 62H, Arterial 

Blood Partial Pressure O2 59L, Arterial Blood HCO3 20L, Arterial Blood Total CO2

22.1, Arterial Blood Oxygen Saturation 80L, Arterial Blood Base Excess -7.4L, 

Gunnar Test ART LINE, Blood Gas Ventilator Setting YES, Blood Gas Inspired Oxygen

100%


7/3/21 00:15: Glucometer 359H


7/3/21 04:30: 


White Blood Count 42.7*H, Red Blood Count 4.05L, Hemoglobin 11.3L, Hematocrit 

36L, Mean Corpuscular Volume 88, Mean Corpuscular Hemoglobin 28, Mean 

Corpuscular Hemoglobin Concent 32, Red Cell Distribution Width 14.1, Platelet 

Count 546H, Mean Platelet Volume 10.7, Immature Granulocyte % (Auto) 11, 

Neutrophils (%) (Auto) 80H, Lymphocytes (%) (Auto) 3L, Monocytes (%) (Auto) 5, 

Eosinophils (%) (Auto) 0, Basophils (%) (Auto) 0, Neutrophils # (Auto) 34.2H, 

Lymphocytes # (Auto) 1.2, Monocytes # (Auto) 2.3H, Eosinophils # (Auto) 0.2, 

Basophils # (Auto) 0.2H, Immature Granulocyte # (Auto) 4.7H, Neutrophils % 

(Manual) 84, Lymphocytes % (Manual) 5, Monocytes % (Manual) 3, Metamyelocytes % 

2, Band Neutrophils 5, Atypical Lymphocytes 1, Polychromasia SLIGHT, Blood Gas 

Puncture Site RIGHT RADIAL, Blood Gas Patient Temperature 39.5, Arterial Blood 

pH 7.07*L, Arterial Blood Partial Pressure CO2 69H, Arterial Blood Partial 

Pressure O2 70L, Arterial Blood HCO3 18L, Arterial Blood Total CO2 20.1L, 

Arterial Blood Oxygen Saturation 84L, Arterial Blood Base Excess -9.8L, Gunnar 

Test ART LINE, Blood Gas Ventilator Setting YES, Blood Gas Inspired Oxygen 100%,

Sodium Level 135, Potassium Level 6.1H, Chloride Level 102, Carbon Dioxide Level

14L, Anion Gap 19H, Blood Urea Nitrogen 62H, Creatinine 4.21#H, Estimat 

Glomerular Filtration Rate 16, BUN/Creatinine Ratio 15, Glucose Level 457*H, 

Calcium Level 7.4L, Phosphorus Level 9.3H, Magnesium Level 2.4


7/3/21 04:50: Triglycerides Level 411H


7/3/21 08:47: Potassium Level 6.6*H





Microbiology


21 Catheter Tip Culture - Final, Complete


          Staphylococcus hominis


          Staphylococcus hominis#2


21 Urine Culture - Final, Complete


          Staphylococcus epidermidis


21 Gram Stain - Final, Complete


          


21 Sputum Culture - Final, Complete


          Staphylococcus aureus


          Usual upper respiratory geno





Home Meds


Active


Reported


Pioglitazone HCl 45 Mg Tablet 45 Mg PO 1800


Omeprazole 20 Mg Tab.rap.dr 20 Mg PO HS


Pregabalin 150 Mg Capsule 150 Mg PO TID


Lovastatin 20 Mg Tablet 20 Mg PO HS


Glipizide 10 Mg Tablet 10 Mg PO BIDAC


     LAST FILLED 04- #60/30 DAY SUPPLY


Invokana (Canagliflozin) 100 Mg Tablet 100 Mg PO DAILY


Duloxetine HCl 60 Mg Capsule.dr 60 Mg PO HS


Lisinopril-Hctz 20-25 mg Tab (Lisinopril/Hydrochlorothiazide) 1 Each Tablet 2 

Each PO 1800


Assessment/Pt Instructions





Discharge Planning:  <30 minutes discharge planning





Discharge Physical Examination


Vital Signs





Vital Signs








  Date Time  Temp Pulse Resp B/P (MAP) Pulse Ox O2 Delivery O2 Flow Rate FiO2


 


7/3/21 11:09  156 24  94   100


 


7/3/21 11:00 39.8   101/58 (72)  Mechanical Ventilator 100.00 








Allergies:  


Coded Allergies:  


     adhesive (Unverified  Allergy, Unknown, RASH, 14)





Discharge Summary


Date of Admission


2021 at 14:00


Date of Discharge





Admission Diagnosis


Assessment:


COVID-19 pneumonia


Hypoxia maxing on Vapotherm transferring to ICU for BiPAP and possible 

intubation


Morbid obesity BMI 40


Diabetes out-of-control in 300 range hemoglobin A1c pending


Neuropathy


Hypertension


Hyperlipidemia





Plan:


Transfer to the ICU


BiPAP


Possible intubation


Lovenox twice daily


Decadron


Convalescent plasma


Remdesivir


Monitor closely


Comfort Measures/


Advance Care discuss with:  patient


Time spent on discussion (min):  5





Discharge Diagnosis


Assessment:


Ventilator dependent respiratory failure


Acute hypoxic respiratory failure from COVID-19 pneumonia requiring intubation 

since failed Vapotherm and BiPAP


Diabetes out-of-control


Obesity


Suspicion of obstructive sleep apnea


Bacteremia with staph replaced PICC line





Plan:


Supportive care


Prognosis guarded





2021:


Complex issues


Poor prognosis


Appreciate eICU vent management


Change out PICC line due to bacteremia and culture tip





2021:


Add Eraxis


Monitor fever


Maintain vancomycin and meropenem





2021:


Continue aggressive care


Poor prognosis





2021:


Updated family


Getting close to futility





(1) Hypoxia


Status:  Acute


(2) On mechanically assisted ventilation


Status:  Acute











CECELIA BROOKS DO                 Jul 3, 2021 11:34

## 2021-07-03 NOTE — TELE-ICU PROGRESS NOTE
Progress Note


added vasopressin and epi drip for refractory hypotension


Checking ABG





Focused Exam


Height, Weight, BMI


Height: 5'11"


Weight: 305lbs. oz. 138.130215rt; 39.92 BMI


Method:Stated











LAVINIA ABEL MD             Jul 3, 2021 11:32

## 2021-07-03 NOTE — TELE-ICU PROGRESS NOTE
Progress Note


potassium this am is 6.1 up from 4.9


creatinine up to 4.2





will give iv bicarb, iv dextrose  and insulin


albuterol to all help with hyperkalemia





will recheck K at 1200





Patient is DNR, but continue to treat all problems as they arise





Focused Exam


Height, Weight, BMI


Height: 5'11"


Weight: 305lbs. oz. 138.178898lw; 39.92 BMI


Method:Stated











LAVINIA ABEL MD             Jul 3, 2021 08:02

## 2021-07-03 NOTE — TELE-ICU PROGRESS NOTE
Progress Note


video rounds completed





41 y/o with Covid PNA and maximal medical therapy. 


On full vent support with progressive deterioration.


Has profound metabolic and respiratory acidosis


and extremely poor prognosis. Has nilda made DNR with no escalation of  care.





ECHMO or lung transplant not realistic at this point





PE: sedated on vent


Pulse: 117


BP: 90/54/sats: 86%


Temp: 39.5








Labs: wbc 42.7


Hgb: 11.3


AB.07/69/70/18





Na: 135


K: 6.1


Cl: 102





Glu: 457








PLAN: supportive and comfort care, DBR


Treat hyperK and recheck


Unfortunately poor prognosis





Focused Exam


Height, Weight, BMI


Height: 5'11"


Weight: 305lbs. oz. 138.715937qa; 39.92 BMI


Method:Stated











LAVINIA ABEL MD             Jul 3, 2021 08:45

## 2021-07-03 NOTE — DIAGNOSTIC IMAGING REPORT
EXAMINATION: Chest radiograph, portable AP view.



DATE: 7/2/2021 11:12 PM



INDICATION: 40-year-old male, worsening hypoxia. History of COVID

19 infection.



COMPARISON:  July 2, 2021.



FINDINGS: The endotracheal tube is 4.5 cm above the agustin. The

nasogastric tube extends at least to the level of the distal

esophagus and proximal stomach although the tip is not well seen.

The right-sided PIC line overlies the mid SVC. Heart size and

mediastinal contours are unchanged. There is no identified

pneumothorax. There is multifocal bilateral lung consolidation

which appears essentially unchanged. Lung volumes are somewhat

low.



IMPRESSION: 

1. Grossly unchanged multifocal bilateral lung consolidation.

2. Support lines and tubes as above.



Dictated by: 



  Dictated on workstation # [id number]